# Patient Record
Sex: FEMALE | Race: WHITE | NOT HISPANIC OR LATINO | Employment: FULL TIME | URBAN - METROPOLITAN AREA
[De-identification: names, ages, dates, MRNs, and addresses within clinical notes are randomized per-mention and may not be internally consistent; named-entity substitution may affect disease eponyms.]

---

## 2017-03-07 ENCOUNTER — CONVERSION ENCOUNTER (OUTPATIENT)
Dept: GENERAL RADIOLOGY | Facility: HOSPITAL | Age: 57
End: 2017-03-07

## 2018-02-13 ENCOUNTER — OFFICE VISIT CONVERTED (OUTPATIENT)
Dept: FAMILY MEDICINE CLINIC | Facility: CLINIC | Age: 58
End: 2018-02-13
Attending: FAMILY MEDICINE

## 2018-02-13 ENCOUNTER — CONVERSION ENCOUNTER (OUTPATIENT)
Dept: FAMILY MEDICINE CLINIC | Facility: CLINIC | Age: 58
End: 2018-02-13

## 2018-03-26 ENCOUNTER — CONVERSION ENCOUNTER (OUTPATIENT)
Dept: GENERAL RADIOLOGY | Facility: HOSPITAL | Age: 58
End: 2018-03-26

## 2018-05-09 ENCOUNTER — CONVERSION ENCOUNTER (OUTPATIENT)
Dept: FAMILY MEDICINE CLINIC | Facility: CLINIC | Age: 58
End: 2018-05-09

## 2018-05-09 ENCOUNTER — OFFICE VISIT CONVERTED (OUTPATIENT)
Dept: FAMILY MEDICINE CLINIC | Facility: CLINIC | Age: 58
End: 2018-05-09
Attending: FAMILY MEDICINE

## 2019-01-22 ENCOUNTER — OFFICE VISIT CONVERTED (OUTPATIENT)
Dept: FAMILY MEDICINE CLINIC | Facility: CLINIC | Age: 59
End: 2019-01-22
Attending: FAMILY MEDICINE

## 2019-01-22 ENCOUNTER — HOSPITAL ENCOUNTER (OUTPATIENT)
Dept: FAMILY MEDICINE CLINIC | Facility: CLINIC | Age: 59
Discharge: HOME | End: 2019-01-22
Attending: FAMILY MEDICINE

## 2019-01-22 ENCOUNTER — CONVERSION ENCOUNTER (OUTPATIENT)
Dept: FAMILY MEDICINE CLINIC | Facility: CLINIC | Age: 59
End: 2019-01-22

## 2019-01-24 LAB
BACTERIA SPEC AEROBE CULT: ABNORMAL
CIPROFLOXACIN SUSC ISLT: <=0.5
CLINDAMYCIN SUSC ISLT: 0.25
DAPTOMYCIN SUSC ISLT: 0.25
ERYTHROMYCIN SUSC ISLT: >=8
GENTAMICIN SUSC ISLT: <=0.5
LEVOFLOXACIN SUSC ISLT: 0.25
OXACILLIN SUSC ISLT: >=4
RIFAMPIN SUSC ISLT: <=0.5
TETRACYCLINE SUSC ISLT: <=1
TMP SMX SUSC ISLT: <=10
VANCOMYCIN SUSC ISLT: 1

## 2019-01-31 ENCOUNTER — CONVERSION ENCOUNTER (OUTPATIENT)
Dept: FAMILY MEDICINE CLINIC | Facility: CLINIC | Age: 59
End: 2019-01-31

## 2019-01-31 ENCOUNTER — OFFICE VISIT CONVERTED (OUTPATIENT)
Dept: FAMILY MEDICINE CLINIC | Facility: CLINIC | Age: 59
End: 2019-01-31
Attending: FAMILY MEDICINE

## 2019-02-25 ENCOUNTER — HOSPITAL ENCOUNTER (OUTPATIENT)
Dept: FAMILY MEDICINE CLINIC | Facility: CLINIC | Age: 59
Discharge: HOME | End: 2019-02-25
Attending: FAMILY MEDICINE

## 2019-02-25 LAB
ALBUMIN SERPL-MCNC: 4.3 G/DL (ref 3.5–5)
ALBUMIN/GLOB SERPL: 1.2 {RATIO} (ref 1.4–2.6)
ALP SERPL-CCNC: 92 U/L (ref 53–141)
ALT SERPL-CCNC: 13 U/L (ref 10–40)
ANION GAP SERPL CALC-SCNC: 17 MMOL/L (ref 8–19)
AST SERPL-CCNC: 20 U/L (ref 15–50)
BASOPHILS # BLD AUTO: 0.04 10*3/UL (ref 0–0.2)
BASOPHILS NFR BLD AUTO: 0.7 % (ref 0–3)
BILIRUB SERPL-MCNC: 0.43 MG/DL (ref 0.2–1.3)
BUN SERPL-MCNC: 15 MG/DL (ref 5–25)
BUN/CREAT SERPL: 17 {RATIO} (ref 6–20)
CALCIUM SERPL-MCNC: 9.5 MG/DL (ref 8.7–10.4)
CHLORIDE SERPL-SCNC: 102 MMOL/L (ref 99–111)
CHOLEST SERPL-MCNC: 199 MG/DL (ref 107–200)
CHOLEST/HDLC SERPL: 4.1 {RATIO} (ref 3–6)
CONV ABS IMM GRAN: 0.01 10*3/UL (ref 0–0.2)
CONV CO2: 28 MMOL/L (ref 22–32)
CONV IMMATURE GRAN: 0.2 % (ref 0–1.8)
CONV TOTAL PROTEIN: 7.8 G/DL (ref 6.3–8.2)
CREAT UR-MCNC: 0.86 MG/DL (ref 0.5–0.9)
DEPRECATED RDW RBC AUTO: 48.7 FL (ref 36.4–46.3)
EOSINOPHIL # BLD AUTO: 0.18 10*3/UL (ref 0–0.7)
EOSINOPHIL # BLD AUTO: 3.1 % (ref 0–7)
ERYTHROCYTE [DISTWIDTH] IN BLOOD BY AUTOMATED COUNT: 15 % (ref 11.7–14.4)
GFR SERPLBLD BASED ON 1.73 SQ M-ARVRAT: >60 ML/MIN/{1.73_M2}
GLOBULIN UR ELPH-MCNC: 3.5 G/DL (ref 2–3.5)
GLUCOSE SERPL-MCNC: 89 MG/DL (ref 65–99)
HBA1C MFR BLD: 12.4 G/DL (ref 12–16)
HCT VFR BLD AUTO: 41.1 % (ref 37–47)
HDLC SERPL-MCNC: 48 MG/DL (ref 40–60)
LDLC SERPL CALC-MCNC: 108 MG/DL (ref 70–100)
LYMPHOCYTES # BLD AUTO: 1.99 10*3/UL (ref 1–5)
MCH RBC QN AUTO: 26.7 PG (ref 27–31)
MCHC RBC AUTO-ENTMCNC: 30.2 G/DL (ref 33–37)
MCV RBC AUTO: 88.4 FL (ref 81–99)
MONOCYTES # BLD AUTO: 0.45 10*3/UL (ref 0.2–1.2)
MONOCYTES NFR BLD AUTO: 7.8 % (ref 3–10)
NEUTROPHILS # BLD AUTO: 3.11 10*3/UL (ref 2–8)
NEUTROPHILS NFR BLD AUTO: 53.8 % (ref 30–85)
NRBC CBCN: 0 % (ref 0–0.7)
OSMOLALITY SERPL CALC.SUM OF ELEC: 294 MOSM/KG (ref 273–304)
PLATELET # BLD AUTO: 265 10*3/UL (ref 130–400)
PMV BLD AUTO: 11.1 FL (ref 9.4–12.3)
POTASSIUM SERPL-SCNC: 4.8 MMOL/L (ref 3.5–5.3)
RBC # BLD AUTO: 4.65 10*6/UL (ref 4.2–5.4)
SODIUM SERPL-SCNC: 142 MMOL/L (ref 135–147)
TRIGL SERPL-MCNC: 215 MG/DL (ref 40–150)
VARIANT LYMPHS NFR BLD MANUAL: 34.4 % (ref 20–45)
VLDLC SERPL-MCNC: 43 MG/DL (ref 5–37)
WBC # BLD AUTO: 5.78 10*3/UL (ref 4.8–10.8)

## 2019-02-26 ENCOUNTER — OFFICE VISIT CONVERTED (OUTPATIENT)
Dept: FAMILY MEDICINE CLINIC | Facility: CLINIC | Age: 59
End: 2019-02-26
Attending: FAMILY MEDICINE

## 2019-03-08 ENCOUNTER — HOSPITAL ENCOUNTER (OUTPATIENT)
Dept: URGENT CARE | Facility: CLINIC | Age: 59
Discharge: HOME | End: 2019-03-08
Attending: EMERGENCY MEDICINE

## 2019-03-09 LAB — BACTERIA SPEC AEROBE CULT: NORMAL

## 2019-03-13 ENCOUNTER — CONVERSION ENCOUNTER (OUTPATIENT)
Dept: FAMILY MEDICINE CLINIC | Facility: CLINIC | Age: 59
End: 2019-03-13

## 2019-03-13 ENCOUNTER — OFFICE VISIT CONVERTED (OUTPATIENT)
Dept: FAMILY MEDICINE CLINIC | Facility: CLINIC | Age: 59
End: 2019-03-13
Attending: FAMILY MEDICINE

## 2019-05-09 ENCOUNTER — OFFICE VISIT CONVERTED (OUTPATIENT)
Dept: FAMILY MEDICINE CLINIC | Facility: CLINIC | Age: 59
End: 2019-05-09
Attending: NURSE PRACTITIONER

## 2019-07-21 ENCOUNTER — HOSPITAL ENCOUNTER (OUTPATIENT)
Dept: URGENT CARE | Facility: CLINIC | Age: 59
Discharge: HOME | End: 2019-07-21

## 2019-08-15 ENCOUNTER — OFFICE VISIT CONVERTED (OUTPATIENT)
Dept: FAMILY MEDICINE CLINIC | Facility: CLINIC | Age: 59
End: 2019-08-15
Attending: NURSE PRACTITIONER

## 2019-08-15 ENCOUNTER — HOSPITAL ENCOUNTER (OUTPATIENT)
Dept: GENERAL RADIOLOGY | Facility: HOSPITAL | Age: 59
Discharge: HOME | End: 2019-08-15
Attending: NURSE PRACTITIONER

## 2019-08-15 ENCOUNTER — HOSPITAL ENCOUNTER (OUTPATIENT)
Dept: FAMILY MEDICINE CLINIC | Facility: CLINIC | Age: 59
Discharge: HOME | End: 2019-08-15
Attending: NURSE PRACTITIONER

## 2019-08-19 ENCOUNTER — OFFICE VISIT CONVERTED (OUTPATIENT)
Dept: UROLOGY | Facility: CLINIC | Age: 59
End: 2019-08-19
Attending: UROLOGY

## 2019-08-19 ENCOUNTER — CONVERSION ENCOUNTER (OUTPATIENT)
Dept: SURGERY | Facility: CLINIC | Age: 59
End: 2019-08-19

## 2020-02-26 ENCOUNTER — HOSPITAL ENCOUNTER (OUTPATIENT)
Dept: OTHER | Facility: HOSPITAL | Age: 60
Discharge: HOME | End: 2020-02-26
Attending: FAMILY MEDICINE

## 2020-02-26 LAB
ALBUMIN SERPL-MCNC: 4.2 G/DL (ref 3.5–5)
ALBUMIN/GLOB SERPL: 1.4 {RATIO} (ref 1.4–2.6)
ALP SERPL-CCNC: 99 U/L (ref 53–141)
ALT SERPL-CCNC: 18 U/L (ref 10–40)
ANION GAP SERPL CALC-SCNC: 15 MMOL/L (ref 8–19)
AST SERPL-CCNC: 19 U/L (ref 15–50)
BASOPHILS # BLD AUTO: 0.03 10*3/UL (ref 0–0.2)
BASOPHILS NFR BLD AUTO: 0.5 % (ref 0–3)
BILIRUB SERPL-MCNC: 0.31 MG/DL (ref 0.2–1.3)
BUN SERPL-MCNC: 15 MG/DL (ref 5–25)
BUN/CREAT SERPL: 18 {RATIO} (ref 6–20)
CALCIUM SERPL-MCNC: 9 MG/DL (ref 8.7–10.4)
CHLORIDE SERPL-SCNC: 98 MMOL/L (ref 99–111)
CHOLEST SERPL-MCNC: 165 MG/DL (ref 107–200)
CHOLEST/HDLC SERPL: 4 {RATIO} (ref 3–6)
CONV ABS IMM GRAN: 0.01 10*3/UL (ref 0–0.2)
CONV CO2: 29 MMOL/L (ref 22–32)
CONV IMMATURE GRAN: 0.2 % (ref 0–1.8)
CONV TOTAL PROTEIN: 7.2 G/DL (ref 6.3–8.2)
CREAT UR-MCNC: 0.85 MG/DL (ref 0.5–0.9)
DEPRECATED RDW RBC AUTO: 46.8 FL (ref 36.4–46.3)
EOSINOPHIL # BLD AUTO: 0.21 10*3/UL (ref 0–0.7)
EOSINOPHIL # BLD AUTO: 3.6 % (ref 0–7)
ERYTHROCYTE [DISTWIDTH] IN BLOOD BY AUTOMATED COUNT: 14.5 % (ref 11.7–14.4)
GFR SERPLBLD BASED ON 1.73 SQ M-ARVRAT: >60 ML/MIN/{1.73_M2}
GLOBULIN UR ELPH-MCNC: 3 G/DL (ref 2–3.5)
GLUCOSE SERPL-MCNC: 116 MG/DL (ref 65–99)
HCT VFR BLD AUTO: 40.9 % (ref 37–47)
HDLC SERPL-MCNC: 41 MG/DL (ref 40–60)
HGB BLD-MCNC: 12.3 G/DL (ref 12–16)
LDLC SERPL CALC-MCNC: 83 MG/DL (ref 70–100)
LYMPHOCYTES # BLD AUTO: 1.68 10*3/UL (ref 1–5)
LYMPHOCYTES NFR BLD AUTO: 28.8 % (ref 20–45)
MCH RBC QN AUTO: 26.2 PG (ref 27–31)
MCHC RBC AUTO-ENTMCNC: 30.1 G/DL (ref 33–37)
MCV RBC AUTO: 87 FL (ref 81–99)
MONOCYTES # BLD AUTO: 0.51 10*3/UL (ref 0.2–1.2)
MONOCYTES NFR BLD AUTO: 8.7 % (ref 3–10)
NEUTROPHILS # BLD AUTO: 3.39 10*3/UL (ref 2–8)
NEUTROPHILS NFR BLD AUTO: 58.2 % (ref 30–85)
NRBC CBCN: 0 % (ref 0–0.7)
OSMOLALITY SERPL CALC.SUM OF ELEC: 288 MOSM/KG (ref 273–304)
PLATELET # BLD AUTO: 259 10*3/UL (ref 130–400)
PMV BLD AUTO: 10.5 FL (ref 9.4–12.3)
POTASSIUM SERPL-SCNC: 3.8 MMOL/L (ref 3.5–5.3)
RBC # BLD AUTO: 4.7 10*6/UL (ref 4.2–5.4)
SODIUM SERPL-SCNC: 138 MMOL/L (ref 135–147)
TRIGL SERPL-MCNC: 207 MG/DL (ref 40–150)
TSH SERPL-ACNC: 2.62 M[IU]/L (ref 0.27–4.2)
VLDLC SERPL-MCNC: 41 MG/DL (ref 5–37)
WBC # BLD AUTO: 5.83 10*3/UL (ref 4.8–10.8)

## 2020-02-27 ENCOUNTER — CONVERSION ENCOUNTER (OUTPATIENT)
Dept: FAMILY MEDICINE CLINIC | Facility: CLINIC | Age: 60
End: 2020-02-27

## 2020-02-27 ENCOUNTER — OFFICE VISIT CONVERTED (OUTPATIENT)
Dept: FAMILY MEDICINE CLINIC | Facility: CLINIC | Age: 60
End: 2020-02-27
Attending: FAMILY MEDICINE

## 2020-04-27 ENCOUNTER — CONVERSION ENCOUNTER (OUTPATIENT)
Dept: FAMILY MEDICINE CLINIC | Facility: CLINIC | Age: 60
End: 2020-04-27

## 2020-04-27 ENCOUNTER — OFFICE VISIT CONVERTED (OUTPATIENT)
Dept: FAMILY MEDICINE CLINIC | Facility: CLINIC | Age: 60
End: 2020-04-27
Attending: FAMILY MEDICINE

## 2020-04-27 ENCOUNTER — HOSPITAL ENCOUNTER (OUTPATIENT)
Dept: GENERAL RADIOLOGY | Facility: HOSPITAL | Age: 60
Discharge: HOME | End: 2020-04-27
Attending: FAMILY MEDICINE

## 2020-04-30 ENCOUNTER — OFFICE VISIT CONVERTED (OUTPATIENT)
Dept: ORTHOPEDIC SURGERY | Facility: CLINIC | Age: 60
End: 2020-04-30
Attending: ORTHOPAEDIC SURGERY

## 2020-04-30 ENCOUNTER — CONVERSION ENCOUNTER (OUTPATIENT)
Dept: ORTHOPEDIC SURGERY | Facility: CLINIC | Age: 60
End: 2020-04-30

## 2020-05-21 ENCOUNTER — HOSPITAL ENCOUNTER (OUTPATIENT)
Dept: GENERAL RADIOLOGY | Facility: HOSPITAL | Age: 60
Discharge: HOME | End: 2020-05-21
Attending: FAMILY MEDICINE

## 2020-05-28 ENCOUNTER — OFFICE VISIT CONVERTED (OUTPATIENT)
Dept: ORTHOPEDIC SURGERY | Facility: CLINIC | Age: 60
End: 2020-05-28
Attending: ORTHOPAEDIC SURGERY

## 2020-06-26 ENCOUNTER — OFFICE VISIT CONVERTED (OUTPATIENT)
Dept: ORTHOPEDIC SURGERY | Facility: CLINIC | Age: 60
End: 2020-06-26
Attending: PHYSICIAN ASSISTANT

## 2020-07-06 ENCOUNTER — HOSPITAL ENCOUNTER (OUTPATIENT)
Dept: GENERAL RADIOLOGY | Facility: HOSPITAL | Age: 60
Discharge: HOME | End: 2020-07-06
Attending: UROLOGY

## 2020-07-17 ENCOUNTER — HOSPITAL ENCOUNTER (OUTPATIENT)
Dept: CT IMAGING | Facility: HOSPITAL | Age: 60
Discharge: HOME | End: 2020-07-17
Attending: UROLOGY

## 2020-08-13 ENCOUNTER — HOSPITAL ENCOUNTER (OUTPATIENT)
Dept: PREADMISSION TESTING | Facility: HOSPITAL | Age: 60
Discharge: HOME | End: 2020-08-13
Attending: UROLOGY

## 2020-08-15 LAB — SARS-COV-2 RNA SPEC QL NAA+PROBE: NOT DETECTED

## 2020-08-18 ENCOUNTER — HOSPITAL ENCOUNTER (OUTPATIENT)
Dept: PERIOP | Facility: HOSPITAL | Age: 60
Setting detail: HOSPITAL OUTPATIENT SURGERY
Discharge: HOME | End: 2020-08-18
Attending: UROLOGY

## 2020-08-26 ENCOUNTER — TELEPHONE CONVERTED (OUTPATIENT)
Dept: UROLOGY | Facility: CLINIC | Age: 60
End: 2020-08-26
Attending: UROLOGY

## 2020-09-02 LAB
COLOR STONE: NORMAL
COMPN STONE: NORMAL
CONV CA OXALATE DIHYDRATE: 20 %
CONV CA OXALATE MONOHYDRATE: 60 %
CONV CALCULI COMMENT: NORMAL
CONV CALCULI DISCLAIMER: NORMAL
CONV CALCULI NOTE: NORMAL
CONV PHOTO (CALCULI): NORMAL
HYDROXYAPATITE: 20 %
SIZE STONE: NORMAL MM
WT STONE: 87 MG

## 2021-01-25 ENCOUNTER — HOSPITAL ENCOUNTER (OUTPATIENT)
Dept: URGENT CARE | Facility: CLINIC | Age: 61
Discharge: HOME | End: 2021-01-25
Attending: EMERGENCY MEDICINE

## 2021-01-27 LAB — SARS-COV-2 RNA SPEC QL NAA+PROBE: NOT DETECTED

## 2021-03-11 ENCOUNTER — HOSPITAL ENCOUNTER (OUTPATIENT)
Dept: FAMILY MEDICINE CLINIC | Facility: CLINIC | Age: 61
Discharge: HOME | End: 2021-03-11
Attending: FAMILY MEDICINE

## 2021-03-11 LAB
ALBUMIN SERPL-MCNC: 4.1 G/DL (ref 3.5–5)
ALBUMIN/GLOB SERPL: 1.4 {RATIO} (ref 1.4–2.6)
ALP SERPL-CCNC: 102 U/L (ref 53–141)
ALT SERPL-CCNC: 17 U/L (ref 10–40)
ANION GAP SERPL CALC-SCNC: 14 MMOL/L (ref 8–19)
AST SERPL-CCNC: 19 U/L (ref 15–50)
BASOPHILS # BLD AUTO: 0.03 10*3/UL (ref 0–0.2)
BASOPHILS NFR BLD AUTO: 0.6 % (ref 0–3)
BILIRUB SERPL-MCNC: 0.43 MG/DL (ref 0.2–1.3)
BUN SERPL-MCNC: 19 MG/DL (ref 5–25)
BUN/CREAT SERPL: 25 {RATIO} (ref 6–20)
CALCIUM SERPL-MCNC: 9.1 MG/DL (ref 8.7–10.4)
CHLORIDE SERPL-SCNC: 103 MMOL/L (ref 99–111)
CHOLEST SERPL-MCNC: 182 MG/DL (ref 107–200)
CHOLEST/HDLC SERPL: 4.4 {RATIO} (ref 3–6)
CONV ABS IMM GRAN: 0.02 10*3/UL (ref 0–0.2)
CONV CO2: 27 MMOL/L (ref 22–32)
CONV IMMATURE GRAN: 0.4 % (ref 0–1.8)
CONV TOTAL PROTEIN: 7.1 G/DL (ref 6.3–8.2)
CREAT UR-MCNC: 0.77 MG/DL (ref 0.5–0.9)
DEPRECATED RDW RBC AUTO: 47.2 FL (ref 36.4–46.3)
EOSINOPHIL # BLD AUTO: 0.17 10*3/UL (ref 0–0.7)
EOSINOPHIL # BLD AUTO: 3.2 % (ref 0–7)
ERYTHROCYTE [DISTWIDTH] IN BLOOD BY AUTOMATED COUNT: 14.6 % (ref 11.7–14.4)
GFR SERPLBLD BASED ON 1.73 SQ M-ARVRAT: >60 ML/MIN/{1.73_M2}
GLOBULIN UR ELPH-MCNC: 3 G/DL (ref 2–3.5)
GLUCOSE SERPL-MCNC: 102 MG/DL (ref 65–99)
HCT VFR BLD AUTO: 40.5 % (ref 37–47)
HDLC SERPL-MCNC: 41 MG/DL (ref 40–60)
HGB BLD-MCNC: 12.4 G/DL (ref 12–16)
LDLC SERPL CALC-MCNC: 109 MG/DL (ref 70–100)
LYMPHOCYTES # BLD AUTO: 1.84 10*3/UL (ref 1–5)
LYMPHOCYTES NFR BLD AUTO: 35 % (ref 20–45)
MCH RBC QN AUTO: 26.8 PG (ref 27–31)
MCHC RBC AUTO-ENTMCNC: 30.6 G/DL (ref 33–37)
MCV RBC AUTO: 87.7 FL (ref 81–99)
MONOCYTES # BLD AUTO: 0.41 10*3/UL (ref 0.2–1.2)
MONOCYTES NFR BLD AUTO: 7.8 % (ref 3–10)
NEUTROPHILS # BLD AUTO: 2.79 10*3/UL (ref 2–8)
NEUTROPHILS NFR BLD AUTO: 53 % (ref 30–85)
NRBC CBCN: 0 % (ref 0–0.7)
OSMOLALITY SERPL CALC.SUM OF ELEC: 292 MOSM/KG (ref 273–304)
PLATELET # BLD AUTO: 262 10*3/UL (ref 130–400)
PMV BLD AUTO: 11.1 FL (ref 9.4–12.3)
POTASSIUM SERPL-SCNC: 4.2 MMOL/L (ref 3.5–5.3)
RBC # BLD AUTO: 4.62 10*6/UL (ref 4.2–5.4)
SODIUM SERPL-SCNC: 140 MMOL/L (ref 135–147)
TRIGL SERPL-MCNC: 159 MG/DL (ref 40–150)
TSH SERPL-ACNC: 1.9 M[IU]/L (ref 0.27–4.2)
VLDLC SERPL-MCNC: 32 MG/DL (ref 5–37)
WBC # BLD AUTO: 5.26 10*3/UL (ref 4.8–10.8)

## 2021-03-18 ENCOUNTER — CONVERSION ENCOUNTER (OUTPATIENT)
Dept: FAMILY MEDICINE CLINIC | Facility: CLINIC | Age: 61
End: 2021-03-18

## 2021-03-18 ENCOUNTER — OFFICE VISIT CONVERTED (OUTPATIENT)
Dept: FAMILY MEDICINE CLINIC | Facility: CLINIC | Age: 61
End: 2021-03-18
Attending: FAMILY MEDICINE

## 2021-03-18 ENCOUNTER — HOSPITAL ENCOUNTER (OUTPATIENT)
Dept: FAMILY MEDICINE CLINIC | Facility: CLINIC | Age: 61
Discharge: HOME | End: 2021-03-18
Attending: FAMILY MEDICINE

## 2021-03-18 LAB
BILIRUB UR QL STRIP: NEGATIVE
COLOR UR: YELLOW
CONV CLARITY OF URINE: CLEAR
CONV PROTEIN IN URINE BY AUTOMATED TEST STRIP: NEGATIVE
CONV UROBILINOGEN IN URINE BY AUTOMATED TEST STRIP: NORMAL
GLUCOSE UR QL: NEGATIVE
HGB UR QL STRIP: NEGATIVE
KETONES UR QL STRIP: NEGATIVE
LEUKOCYTE ESTERASE UR QL STRIP: NORMAL
NITRITE UR QL STRIP: NEGATIVE
PH UR STRIP.AUTO: 7 [PH]
SP GR UR: 1.02

## 2021-03-22 LAB
CONV LAST MENSTURAL PERIOD: 2018
SPECIMEN SOURCE: NORMAL
SPECIMEN SOURCE: NORMAL
THIN PREP CVX: NORMAL

## 2021-04-08 ENCOUNTER — OFFICE VISIT CONVERTED (OUTPATIENT)
Dept: SURGERY | Facility: CLINIC | Age: 61
End: 2021-04-08
Attending: NURSE PRACTITIONER

## 2021-04-23 ENCOUNTER — HOSPITAL ENCOUNTER (OUTPATIENT)
Dept: GASTROENTEROLOGY | Facility: HOSPITAL | Age: 61
Setting detail: HOSPITAL OUTPATIENT SURGERY
Discharge: HOME | End: 2021-04-23
Attending: SURGERY

## 2021-05-10 ENCOUNTER — OFFICE VISIT CONVERTED (OUTPATIENT)
Dept: SURGERY | Facility: CLINIC | Age: 61
End: 2021-05-10
Attending: NURSE PRACTITIONER

## 2021-05-10 NOTE — H&P
History and Physical      Patient Name: Marilee De La Rosa   Patient ID: [id number]   Sex: Female   YOB: 1960    Primary Care Provider: Fran Renner III MD   Referring Provider: Fran Renner III MD    Visit Date: 2020    Provider: Dakota Ruiz MD   Location: [facility]   Location Address: [address] [place] [address]  [id number]   Location Phone: [phone]          Chief Complaint  · Left hand pain      History Of Present Illness  Marilee De La Rosa is a 59 year old /White female who presents today to [facility].      Patient presents today for left hand. Patient was walking her dog when she fell on her left hand. Patient had some X-Rays ordered and these revealed a non displaced fracture to the 5th metacarpal neck. No previous fractures and other wise healthy. Patient has been off of work because of COVID-19. Patient reports pain and swelling to the hand. Patient made herself a brace and has been keeping it wrapped.       Past Medical History  Allergic rhinitis; Depression (emotion); High cholesterol; Kidney calculus; Kidney Disease; Medication management; Renal mass; Urinary tract infection; Vaginal bleeding         Past Surgical History  Gum graft; I have had no surgeries; Tubal ligation; [place] Tooth Extraction         Medication List  fluoxetine 20 mg oral capsule; fluticasone propionate 50 mcg/actuation nasal spray,suspension; Mucus Relief DM  mg oral tablet; simvastatin 40 mg oral tablet; Stahist AD 25-60 mg oral tablet; Vitamin D3 1,000 unit oral capsule; Zyrtec 10 mg oral tablet         Allergy List  NO KNOWN DRUG ALLERGIES; ibuprofen         Family Medical History  Lung Disease; Heart Disease; Cancer, Unspecified; Diabetes, unspecified type; Heart Attack (MI)         Reproductive History   3 Para 2 0 1 2       Social History  Active but no formal exercise; Alcohol (Never); Alcohol Use (Never); lives with spouse; ;  ".; Recreational Drug Use (Never); Tobacco (Never); Working         Immunizations  Name Date Admin   Hepatitis A    Hepatitis A    Influenza    Influenza    Influenza    Influenza    Influenza    Influenza    Tdap          Review of Systems  · Constitutional  o Denies  o : fever, chills, weight loss  · Cardiovascular  o Denies  o : chest pain, shortness of breath  · Gastrointestinal  o Denies  o : liver disease, heartburn, nausea, blood in stools  · Genitourinary  o Denies  o : painful urination, blood in urine  · Integument  o Denies  o : rash, itching  · Neurologic  o Denies  o : headache, weakness, loss of consciousness  · Musculoskeletal  o Denies  o : painful, swollen joints  · Psychiatric  o Denies  o : drug/alcohol addiction, anxiety, depression      Vitals  Date Time BP Position Site L\R Cuff Size HR RR TEMP (F) WT  HT  BMI kg/m2 BSA m2 O2 Sat        04/30/2020 11:07 AM         204lbs 16oz 5'  4\" 35.19 2.05           Physical Examination  · Constitutional  o Appearance  o : well developed, well-nourished, no obvious deformities present  · Head and Face  o Head  o :   § Inspection  § : normocephalic  o Face  o :   § Inspection  § : no facial lesions  · Eyes  o Conjunctivae  o : conjunctivae normal  o Sclerae  o : sclerae white  · Ears, Nose, Mouth and Throat  o Ears  o :   § External Ears  § : appearance within normal limits  § Hearing  § : intact  o Nose  o :   § External Nose  § : appearance normal  · Neck  o Inspection/Palpation  o : normal appearance  o Range of Motion  o : full range of motion  · Respiratory  o Respiratory Effort  o : breathing unlabored  o Inspection of Chest  o : normal appearance  o Auscultation of Lungs  o : no audible wheezing or rales  · Cardiovascular  o Heart  o : regular rate  · Gastrointestinal  o Abdominal Examination  o : soft and non-tender  · Skin and Subcutaneous Tissue  o General Inspection  o : intact, no rashes  · Psychiatric  o General  o : Alert and oriented " x3  o Judgement and Insight  o : judgment and insight intact  o Mood and Affect  o : mood normal, affect appropriate  · Left Hand  o Inspection  o : Some swelling to the hand. Swelling and bruising over the lower aspect of the hand and over the 4th metacarpal neck. Decreased ROM of the hand. No scissoring or malrotation of the 4th digit. ROM decreased secondary with pain. Neurovascularly intact.               Assessment  · Fracture: 4th Metacarpal Neck     815.00  · Arthralgia of left hand     719.44/M25.542      Plan  · Medications  o Medications have been Reconciled  o Transition of Care or Provider Policy  · Instructions  o Dr. Ruiz saw and examined the patient and agrees with plan.   o Reviewed the patient's Past Medical, Social, and Family history as well as the ROS at today's visit, no changes.  o Call or return if worsening symptoms.  o Follow Up in 4 weeks.   o This note was transcribed by Lionel Zapata. mildred.  o Discussed treatment options with patient. Discussed cast vs. splint or immobilization. Patient wishes to go with brace and chun straps. Patient will do general ROM with hand but will avoid weight bearing. Follow up in 4 weeks with repeat X-Rays will call if any problems arise.             Electronically Signed by: Davy Zapata - , Other -Author on May 5, 2020 10:28:55 AM  Electronically Co-signed by: Dakota Ruiz MD -Reviewer on May 6, 2020 07:15:16 AM

## 2021-05-11 NOTE — H&P
History and Physical      Patient Name: Marilee De La Rosa   Patient ID: [id number]   Sex: Female   YOB: 1960    Primary Care Provider: Fran Renner III MD   Referring Provider: Fran Renner III MD    Visit Date: April 8, 2021    Provider: JOSE Bedolla   Location: [facility]   Location Address: [address] [place] [address]  [id number]   Location Phone: [phone]          Chief Complaint  · Requesting colonoscopy  · Age 50 or over  · Here today for a pre-surgical colon screening visit      History Of Present Illness  The patient is a 60 year old /White female presenting to the [facility]ist office on a referral from Fran Renner III, MD.   Marilee De La Rosa needs to have a screening colonoscopy.   Patient states that they have had a colonoscopy. 10 years ago   Patient currently complains of: no complaints   Patient Does not have family history of colon cancer.      Patient presents today on referral from Dr. Fran Renner for a screening colonoscopy.  Patient denies any abdominal pain, change in bowel habit, or rectal bleeding.  Denies any family history of colorectal cancer.  Reports that her sisters had polyps removed every colonoscopy.    12/11: Colonoscopy ([facility]): Normal colon.       Past Medical History  Disease Name Date Onset Notes   Allergic rhinitis --  --    Allergic rhinitis, chronic --  --    Depression (emotion) 02/26/2019 --    High cholesterol --  --    Kidney calculus --  --    Kidney Disease --  --    Kidney Stones --  --    Medication management 02/26/2019 --    Renal cyst 7/2020 seen on ct scan 7/2020   Urinary tract infection 04/29/2014 --    Vaginal bleeding 04/30/2014 --          Past Surgical History  Procedure Name Date Notes   Gum graft --  --    Kidney Stone Surgery, Unspecified --  --    Tubal ligation 1986 --    [place] Tooth Extraction 2001 --          Medication List  Name Date Started Instructions   fluoxetine 20 mg oral  capsule 2021 TAKE 1 CAPSULE BY MOUTH ONCE DAILY FOR 90 DAYS   fluticasone propionate 50 mcg/actuation nasal spray,suspension 2021 USE 2 SPRAY(S) IN EACH NOSTRIL ONCE DAILY AS NEEDED FOR 90 DAYS   ketorolac 10 mg oral tablet 2020 take 1 tablet (10 mg) by oral route every 6 hours as needed not to exceed 40 mg in 24hrs   Mucus Relief DM  mg oral tablet  take 1 tablet by oral route every 12 hours   simvastatin 40 mg oral tablet 2021 TAKE ONE TABLET BY MOUTH ONCE DAILY IN THE EVENING FOR 90 DAYS   Stahist AD 25-60 mg oral tablet 2020 TAKE 1 TABLET BY MOUTH TWICE DAILY   Vitamin D3 1,000 unit oral capsule  take 1 capsule by oral route daily   Zyrtec 10 mg oral tablet 2019 take 1 tablet by oral route once a day (at bedtime) for 90 days         Allergy List  Allergen Name Date Reaction Notes   ibuprofen --  --  --    oxycodone --  --  2020 -        Allergies Reconciled  Family Medical History  Disease Name Relative/Age Notes   Lung Disease Mother/   --    Rectal Neoplasm, Malignant Aunt/   --    Heart Disease Father/   Father  Brother with Mitral Valve replacement   Cancer, Unspecified Brother/   Brother   Diabetes, unspecified type Sister/   Sister  Sister; Brother   Heart Attack (MI) Father/68   --    Family history of colon cancer Aunt/   Aunt/60s         Reproductive History  Menstrual   Age Menarche: 12 Age Menopause: 52   Pregnancy Summary   Total Pregnancies: 3 Full Term: 2 Premature: 0   Ab Induced: 0 Ab Spontaneous: 1 Ectopics: 0   Multiples: 0 Livin         Social History  Finding Status Start/Stop Quantity Notes   Active but no formal exercise --  --/-- --  --    Alcohol Never --/-- --  2021 -    lives with spouse --  --/-- --  --     --  --/-- --  --    Recreational Drug Use Never --/-- --  no   Tobacco Never --/-- --  never smoker   Working --  --/-- --  --          Review of Systems  · Constitutional  o Denies  o : fever,  "chills  · Eyes  o Denies  o : yellowish discoloration of eyes  · HENT  o Denies  o : difficulty swallowing  · Cardiovascular  o Denies  o : chest pain, chest pain on exertion  · Respiratory  o Denies  o : shortness of breath  · Gastrointestinal  o Denies  o : nausea, vomiting, diarrhea, constipation  · Genitourinary  o Denies  o : abnormal color of urine  · Integument  o Denies  o : rash  · Neurologic  o Denies  o : tingling or numbness  · Musculoskeletal  o Denies  o : joint pain  · Endocrine  o Denies  o : weight gain, weight loss      Vitals  Date Time BP Position Site L\R Cuff Size HR RR TEMP (F) WT  HT  BMI kg/m2 BSA m2 O2 Sat FR L/min FiO2 HC       04/08/2021 10:28 AM       13  208lbs 0oz 5'  4\" 35.7 2.06             Physical Examination  · Constitutional  o Appearance  o : well developed, well-nourished, patient in no apparent distress  · Head and Face  o Head  o :   § Inspection  § : atraumatic, normocephalic  o Face  o :   § Inspection  § : no facial lesions  · Eyes  o Conjunctivae  o : conjunctivae normal  o Sclerae  o : sclerae white  · Neck  o Inspection/Palpation  o : normal appearance, no masses or tenderness, trachea midline  · Respiratory  o Respiratory Effort  o : breathing unlabored  · Skin and Subcutaneous Tissue  o General Inspection  o : no lesions present, no areas of discoloration, skin turgor normal, texture normal  · Neurologic  o Mental Status Examination  o :   § Orientation  § : grossly oriented to person, place and time  § Attention  § : attention normal, concentration abilities normal  § Fund of Knowledge  § : fund of knowledge within normal limits, patient aware of current events  o Gait and Station  o : normal gait, able to stand without difficulty  · Psychiatric  o Judgement and Insight  o : judgment and insight intact  o Mood and Affect  o : mood normal, affect appropriate              Assessment  · Screening for colon cancer     V76.51/Z12.11    Problems " Reconciled  Plan  · Orders  o Consent for Colonoscopy Screening-Possible risk/complications, benefits, and alternatives to surgical or invasive procedure have been explained to patient and/or legal guardian. -Patient has been evaluated and can tolerate anesthesia and/or sedation. Risks, benefits, and alternatives to anesthesia and sedation have been explained to patient and/or legal guardian. () - V76.51/Z12.11 - 04/23/2021  · Medications  o Medications have been Reconciled  o Transition of Care or Provider Policy  · Instructions  o Surgical Facility: [facility]  o Handouts Provided Pre-Procedure Instructions including date, time, and location of procedure.   o PLAN: Proceeed with colonoscopy. Patient understands risks/benefits and is willing to proceed.   o ***Surgical Orders***  o RISK AND BENEFITS:  o Given these options, the patient has verbally expressed an understanding of the risks of the surgery and finds these risks acceptable. Will proceed with surgery as soon as possible.  o O.R. PREP: Per protocol   o IV: Per Anesthesia  o Please sign permit for: Colonoscopy with possible biopsies by Dr. Huffman.   o The above History and Physical Examination has been completed within 30 days of admission.  o ***Patient Status***  o Outpatient  o Follow up in the in the office post procedure.  o Electronically Identified Patient Education Materials Provided Electronically  · Disposition  o EMR dragon/transcription disclaimer: Much of this encounter note is an electronic transcription/translation of spoken language to printed text. Electronic translation of spoken language may permit erroneous, or at times nonsensical words or phrases to be inadvertently trasncribed; although I have reviewed the note for such errors, some may still exist.            Electronically Signed by: JOSE Bedolla -Author on April 8, 2021 11:13:53 AM

## 2021-05-12 NOTE — PROGRESS NOTES
Progress Note      Patient Name: Marilee De La Rosa   Patient ID: [id number]   Sex: Female   YOB: 1960    Primary Care Provider: Fran Renner III MD   Referring Provider: Fran Renner III MD    Visit Date: April 27, 2020    Provider: Fran Renner III MD   Location: [facility]   Location Address: [address] [place] [address]  [id number]   Location Phone: [phone]          Chief Complaint  · Pain on top of left hand, left 4th and 5th fingers difficult to move, fall this morning while walking dog, extended left hand out       History Of Present Illness  Marilee De La Rosa is a 59 year old /White female who presents for evaluation and treatment of:      HPI     patient is a 59-year-old fell on outstretched hand tenderness over the fourth metacarpal abrasion to the left knee does not know her last tetanus shot.  We will give her the tetanus here in the office.  Pain and closing her hand fourth metacarpal in the fall was a trip.    ROS    no previous falls     Physical Exam    blood pressure is 134/74   General obviously holding her hand out some pain.    Musculoskeletal tenderness over the fourth metacarpal.  Cannot extend the hand or can close make a fist but this hurts.  Possible fracture of the fourth metacarpal    Assessment     tenderness fourth metacarpal rule out fracture    Plan     x-ray left hand       Past Medical History  Disease Name Date Onset Notes   Allergic rhinitis --  --    Depression (emotion) 02/26/2019 --    High cholesterol --  --    Kidney calculus --  --    Medication management 02/26/2019 --    Renal mass --  --    Urinary tract infection 04/29/2014 --    Vaginal bleeding 04/30/2014 --          Past Surgical History  Procedure Name Date Notes   Gum graft --  --    Tubal ligation 1986 --    [place] Tooth Extraction 2001 --          Medication List  Name Date Started Instructions   fluoxetine 20 mg oral capsule 02/27/2020 TAKE 1 CAPSULE BY  MOUTH ONCE DAILY FOR 90 DAYS   fluticasone propionate 50 mcg/actuation nasal spray,suspension 2019 USE 2 SPRAY(S) IN EACH NOSTRIL ONCE DAILY AS NEEDED FOR 90 DAYS   Mucus Relief DM  mg oral tablet  take 1 tablet by oral route every 12 hours   simvastatin 40 mg oral tablet 2020 TAKE ONE TABLET BY MOUTH ONCE DAILY IN THE EVENING FOR 90 DAYS   Stahist AD 25-60 mg oral tablet 2019 TAKE 1 TABLET BY MOUTH TWICE DAILY FOR 90 DAYS   Vitamin D3 1,000 unit oral capsule  take 1 capsule by oral route daily   Zyrtec 10 mg oral tablet 2019 take 1 tablet by oral route once a day (at bedtime) for 90 days         Allergy List  Allergen Name Date Reaction Notes   ibuprofen --  --  --        Allergies Reconciled  Family Medical History  Disease Name Relative/Age Notes   Lung Disease Mother/   --    Heart Disease Brother/  Father/   Brother with Mitral Valve replacement   Heart Attack (MI) Father/68   --          Reproductive History  Menstrual   Age Menarche: 12 Age Menopause: 52   Pregnancy Summary   Total Pregnancies: 3 Full Term: 2 Premature: 0   Ab Induced: 0 Ab Spontaneous: 1 Ectopics: 0   Multiples: 0 Livin         Social History  Finding Status Start/Stop Quantity Notes   Active but no formal exercise --  --/-- --  --    Alcohol Never --/-- --  --     --  --/-- --  --    Tobacco Never --/-- --  --          Immunizations  NameDate Admin Mfg Trade Name Lot Number Route Inj VIS Given VIS Publication   Hepatitis A02019 SKB HAVRIX-ADULT [id number]  LD 2019    Comments: ndc [id number]   Hepatitis A02019 MSD VAQTA-ADULT [id number]  LD 2019   Comments: NDC [id number]   Hpjcaljvh95/ NE Fluarix, quadrivalent, preservative free  NE NE     Comments:    Jjriyyuiv71/ [facility] Fluzone Quadrivalent [id number] IM LD 10/16/2017 2015   Comments:    Jizcmdnut98/ SKB Fluarix, quadrivalent, preservative free [id number] IM RD 10/28/2016 08/15/2015   Comments:   "  Mrynqyfmg68/20/2015 SKB Fluarix, quadrivalent, preservative free [id number] IM LD 10/20/2015 07/02/2012   Comments:    Lwuqpipuq84/13/2014 SKB Fluarix, quadrivalent, preservative free [id number] IM RD 10/13/2014 07/02/2012   Comments:    Xddfncwqg54/04/2013 NE Not Entered [id number] IM NE 12/13/2013 07/02/2012   Comments:    Tdap04/27/2020 SKB BOOSTRIX [id number] IM LD 04/27/2020    Comments: NDC [id number]         Vitals  Date Time BP Position Site L\R Cuff Size HR RR TEMP (F) WT  HT  BMI kg/m2 BSA m2 O2 Sat HC       04/27/2020 01:15 /74 Sitting       204lbs 16oz 5'  4\" 35.19 2.05                   Assessment  · Need for tetanus booster     V03.7/Z23  · Fall at home, initial encounter       Unspecified fall, initial encounter     E888.9/W19.XXXA  Unspecified place in unspecified non-institutional (private) residence as the place of occurrence of the external cause     E888.9/Y92.009  · Left hand pain     729.5/M79.642  · Finger pain, left     729.5/M79.645  04/27/2020 4th and 5th finger    · Metacarpophalangeal joint pain of left hand     719.44/M25.542    Problems Reconciled  Plan  · Orders  o Immunization Admin Fee (Single) ([facility]) (69090) - V03.7/Z23 - 04/27/2020  o TDaP Vaccine - Age 7+ (62533) - V03.7/Z23 - 04/27/2020   Vaccine - Tdap; Dose: .50; Site: Left Deltoid; Route: Intramuscular; Date: 04/27/2020 16:16:00; Exp: 12/03/2021; Lot: [id number]; Mfg: [employer]; TradeName: BOOSTRIX; Administered By: Puja Dhillon; Comment: NDC [id number]  o ACO-39: Current medications updated and reviewed () - - 04/27/2020  o ACO-13: Fall Risk Screening with 2 or more falls in past year or any fall with injury in the past year (1100F) - - 04/27/2020  o Hand (Left) 3 or more views X-Ray [facility] Preferred View ([id number]) - 729.5/M79.642, 729.5/M79.645, 719.44/M25.542 - 04/27/2020   pain top of left hand, pain left ring and pinky finger  · Medications  o Medications have been Reconciled  o Transition of Care or Provider " Policy  · Instructions  o Discussed with patient the need for tetanus vaccination.   o Patient is taking medications as prescribed and doing well.   o Take all medications as prescribed/directed.  o Patient instructed/educated on their diet and exercise program.  o Patient was educated/instructed on their diagnosis, treatment and medications prior to discharge from the clinic today.  o Bring all medicines with their bottles to each office visit.  o Time spent with the patient was 20 minutes, more than 50% face to face.  o Chronic conditions reviewed and taken into consideration for today's treatment plan.  o Discussed Covid-19 precautions including, but not limited to, social distancing, avoid touching your face, and hand washing.             Electronically Signed by: Puja Dhillon, -Author on April 28, 2020 03:50:12 PM  Electronically Co-signed by: Fran Renner III MD -Reviewer on April 29, 2020 12:44:01 PM

## 2021-05-13 NOTE — PROGRESS NOTES
"   Progress Note      Patient Name: Marilee De La Rosa   Patient ID: [id number]   Sex: Female   YOB: 1960    Primary Care Provider: Fran Renner III MD   Referring Provider: Fran Renner III MD    Visit Date: 2020    Provider: Toney Harris PA-C   Location: [facility]   Location Address: [address] [place] [address]  [id number]   Location Phone: [phone]          Chief Complaint  · Left hand pain      History Of Present Illness  Marilee De La Rosa is a 59 year old /White female who presents today to [facility]. Patient presents for follow-up evaluation of left fourth metacarpal neck fracture. Original injury was 2020. Patient states she has been using the chun straps since the last visit she states she is using the hand \"a little more \". Patient states pain has decreased she still has some pain with certain range of motion and activity but otherwise she is getting more function of the hand. Patient denies further injury. Patient denies need for pain medication or anti-inflammatory medication.       Past Medical History  Allergic rhinitis; Depression (emotion); High cholesterol; Kidney calculus; Kidney Disease; Medication management; Renal mass; Urinary tract infection; Vaginal bleeding         Past Surgical History  Gum graft; I have had no surgeries; Tubal ligation; [place] Tooth Extraction         Medication List  fluoxetine 20 mg oral capsule; fluticasone propionate 50 mcg/actuation nasal spray,suspension; Mucus Relief DM  mg oral tablet; simvastatin 40 mg oral tablet; Stahist AD 25-60 mg oral tablet; Vitamin D3 1,000 unit oral capsule; Zyrtec 10 mg oral tablet         Allergy List  NO KNOWN DRUG ALLERGIES; ibuprofen       Allergies Reconciled  Family Medical History  Lung Disease; Heart Disease; Cancer, Unspecified; Diabetes, unspecified type; Heart Attack (MI)         Reproductive History   3 Para 2 0 1 2       Social History  Active " "but no formal exercise; Alcohol (Never); Alcohol Use (Never); lives with spouse; ; .; Recreational Drug Use (Never); Tobacco (Never); Working         Immunizations  Name Date Admin   Hepatitis A    Hepatitis A    Influenza    Influenza    Influenza    Influenza    Influenza    Influenza    Tdap          Review of Systems  · Constitutional  o Denies  o : fever, chills, weight loss  · Cardiovascular  o Denies  o : chest pain, shortness of breath  · Gastrointestinal  o Denies  o : liver disease, heartburn, nausea, blood in stools  · Genitourinary  o Denies  o : painful urination, blood in urine  · Integument  o Denies  o : rash, itching  · Neurologic  o Denies  o : headache, weakness, loss of consciousness  · Musculoskeletal  o Denies  o : painful, swollen joints  · Psychiatric  o Denies  o : drug/alcohol addiction, anxiety, depression      Vitals  Date Time BP Position Site L\R Cuff Size HR RR TEMP (F) WT  HT  BMI kg/m2 BSA m2 O2 Sat        06/26/2020 09:33 AM      78 - R   211lbs 2oz 5'  4\" 36.24 2.08 98 %          Physical Examination  · Constitutional  o Appearance  o : well developed, well-nourished, no obvious deformities present  · Head and Face  o Head  o :   § Inspection  § : normocephalic  o Face  o :   § Inspection  § : no facial lesions  · Eyes  o Conjunctivae  o : conjunctivae normal  o Sclerae  o : sclerae white  · Ears, Nose, Mouth and Throat  o Ears  o :   § External Ears  § : appearance within normal limits  § Hearing  § : intact  o Nose  o :   § External Nose  § : appearance normal  · Neck  o Inspection/Palpation  o : normal appearance  o Range of Motion  o : full range of motion  · Respiratory  o Respiratory Effort  o : breathing unlabored  o Inspection of Chest  o : normal appearance  o Auscultation of Lungs  o : no audible wheezing or rales  · Cardiovascular  o Heart  o : regular rate  · Gastrointestinal  o Abdominal Examination  o : soft and non-tender  · Skin and Subcutaneous " Tissue  o General Inspection  o : intact, no rashes  · Psychiatric  o General  o : Alert and oriented x3  o Judgement and Insight  o : judgment and insight intact  o Mood and Affect  o : mood normal, affect appropriate  · Left Hand  o Inspection  o : Patient is nontender at fracture site, makes a full fist, 5 out of 5  strength, full flexion, extension, abduction and abduction of fingers and thumb.  · In Office Procedures  o View  o : AP/LATERAL  o Site  o : left, hand  o Indication  o : Left hand pain  o Study  o : X-rays ordered, taken in the office, and reviewed today.  o Xray  o : Good healing of fourth metacarpal neck fracture, no increased displacement or angulation.  o Comparative Data  o : No comparative data found          Assessment  · Arthralgia of left hand     719.44/M25.542  · Left fourth metacarpal bone fracture     815.00/S62.309A      Plan  · Orders  o Hand (Left) [facility] Preferred View ([id number]) - 719.44/M25.542 - 06/26/2020  · Medications  o Medications have been Reconciled  o Transition of Care or Provider Policy  · Instructions  o Reviewed the patient's Past Medical, Social, and Family history as well as the ROS at today's visit, no changes.  o Call or return if worsening symptoms.  o Follow Up in 4 weeks.   o Reviewed x-rays with the patient, advised her to continue activity as tolerated, and exercise handout was given. Follow-up in 4 weeks, no x-ray unless symptomatic.  o Electronically Identified Patient Education Materials Provided Electronically            Electronically Signed by: RAMIN Miranda-C -Author on June 26, 2020 10:13:36 AM  Electronically Co-signed by: Dakota Ruiz MD -Reviewer on June 27, 2020 10:23:11 PM

## 2021-05-13 NOTE — PROGRESS NOTES
Progress Note      Patient Name: Marilee De La Rosa   Patient ID: [id number]   Sex: Female   YOB: 1960    Primary Care Provider: Fran Renner III MD   Referring Provider: Fran Renner III MD    Visit Date: May 28, 2020    Provider: Dakota Ruiz MD   Location: [facility]   Location Address: [address] [place] [address]  [id number]   Location Phone: [phone]          Chief Complaint  · Left hand pain      History Of Present Illness  Marilee De La Rosa is a 59 year old /White female who presents today to [facility].      The patient presents today for follow up evaluation of 4th metacarpal fracture. She has been wearing the brace and the chun straps. She is doing well today. She has been off of work, and has occasional soreness in the hand but has been doing some dishes and activities without much problem.       Past Medical History  Allergic rhinitis; Depression (emotion); High cholesterol; Kidney calculus; Kidney Disease; Medication management; Renal mass; Urinary tract infection; Vaginal bleeding         Past Surgical History  Gum graft; I have had no surgeries; Tubal ligation; [place] Tooth Extraction         Medication List  fluoxetine 20 mg oral capsule; fluticasone propionate 50 mcg/actuation nasal spray,suspension; Mucus Relief DM  mg oral tablet; simvastatin 40 mg oral tablet; Stahist AD 25-60 mg oral tablet; Vitamin D3 1,000 unit oral capsule; Zyrtec 10 mg oral tablet         Allergy List  NO KNOWN DRUG ALLERGIES; ibuprofen         Family Medical History  Lung Disease; Heart Disease; Cancer, Unspecified; Diabetes, unspecified type; Heart Attack (MI)         Reproductive History   3 Para 2 0 1 2       Social History  Active but no formal exercise; Alcohol (Never); Alcohol Use (Never); lives with spouse; ; .; Recreational Drug Use (Never); Tobacco (Never); Working         Immunizations  Name Date Admin   Hepatitis A   "  Hepatitis A    Influenza    Influenza    Influenza    Influenza    Influenza    Influenza    Tdap          Review of Systems  · Constitutional  o Denies  o : fever, chills, weight loss  · Cardiovascular  o Denies  o : chest pain, shortness of breath  · Gastrointestinal  o Denies  o : liver disease, heartburn, nausea, blood in stools  · Genitourinary  o Denies  o : painful urination, blood in urine  · Integument  o Denies  o : rash, itching  · Neurologic  o Denies  o : headache, weakness, loss of consciousness  · Musculoskeletal  o Denies  o : painful, swollen joints  · Psychiatric  o Denies  o : drug/alcohol addiction, anxiety, depression      Vitals  Date Time BP Position Site L\R Cuff Size HR RR TEMP (F) WT  HT  BMI kg/m2 BSA m2 O2 Sat        05/28/2020 09:46 AM         204lbs 16oz 5'  4\" 35.19 2.05           Physical Examination  · Constitutional  o Appearance  o : well developed, well-nourished, no obvious deformities present  · Head and Face  o Head  o :   § Inspection  § : normocephalic  o Face  o :   § Inspection  § : no facial lesions  · Eyes  o Conjunctivae  o : conjunctivae normal  o Sclerae  o : sclerae white  · Ears, Nose, Mouth and Throat  o Ears  o :   § External Ears  § : appearance within normal limits  § Hearing  § : intact  o Nose  o :   § External Nose  § : appearance normal  · Neck  o Inspection/Palpation  o : normal appearance  o Range of Motion  o : full range of motion  · Respiratory  o Respiratory Effort  o : breathing unlabored  o Inspection of Chest  o : normal appearance  o Auscultation of Lungs  o : no audible wheezing or rales  · Cardiovascular  o Heart  o : regular rate  · Gastrointestinal  o Abdominal Examination  o : soft and non-tender  · Skin and Subcutaneous Tissue  o General Inspection  o : intact, no rashes  · Psychiatric  o General  o : Alert and oriented x3  o Judgement and Insight  o : judgment and insight intact  o Mood and Affect  o : mood normal, affect " appropriate  · Left Hand  o Inspection  o : Still some tenderness along the 4th metacarpal neck region. No deformity. ROM is grossly intact. Neurovascularly intact.   · In Office Procedures  o View  o : AP/LATERAL  o Site  o : left hand  o Indication  o : left hand pain  o Study  o : X-rays ordered, taken in the office, and reviewed today.  o Xray  o : Non-displaced partially healing fracture of the 4th metacarpal neck. No increased displacement or angulation.   o Comparative Data  o : No comparative data found          Assessment  · Fracture: 4th Metacarpal neck fracture     815.00  · Arthralgia of left hand     719.44/M25.542      Plan  · Orders  o Hand (Left) [facility] Preferred View ([id number]) - 719.44/M25.542 - 05/28/2020  · Medications  o Medications have been Reconciled  o Transition of Care or Provider Policy  · Instructions  o Dr. Ruiz saw and examined the patient and agrees with plan.   o X-rays reviewed by Dr. Ruiz.  o Reviewed the patient's Past Medical, Social, and Family history as well as the ROS at today's visit, no changes.  o Call or return if worsening symptoms.  o This note was transcribed by Lionel Zapata. mildred.  o Discussed plan with the patient. The patient is doing well today and she may return to work with no lifting over 2 pounds with her hand. Plan to follow up with in 4 weeks with repeat X-Rays of the hand. Work note was provided today. Advised patient to call with any problems.             Electronically Signed by: Davy Zapata - , Other -Author on Jessie 3, 2020 01:30:43 PM  Electronically Co-signed by: Dakota Ruiz MD -Reviewer on Jessie 3, 2020 09:33:47 PM

## 2021-05-13 NOTE — PROGRESS NOTES
Progress Note      Patient Name: Marilee De La Rosa   Patient ID: [id number]   Sex: Female   YOB: 1960    Primary Care Provider: Fran Renner III MD   Referring Provider: Fran Renner III MD    Visit Date: 2020    Provider: Griselda Mckenna MD   Location: [facility]   Location Address: [address] [place] [address]  [id number]   Location Phone: [phone]          History Of Present Illness  The patient returns via telehealth telephone visit for a scheduled post-operative visit after undergoing left ureteroscopy and stone basket extraction for left renal and ureteral calculi on 2020. A double J stent was inserted but has been removed by patient at home.   Since the procedure, the patient has had persistent flank pain.      Time spent with the patient was 7 minutes.     Staff present during the visit were myself (Dr. Mckenna) and Mariela Pollard MA.       Past Medical History  Allergic rhinitis; Depression (emotion); High cholesterol; Kidney calculus; Kidney Disease; Medication management; Renal mass; Urinary Tract Infection; Vaginal bleeding         Past Surgical History  Gum graft; Kidney Stone Surgery, Unspecified; Tubal ligation; [place] Tooth Extraction         Medication List  fluoxetine 20 mg oral capsule; fluticasone propionate 50 mcg/actuation nasal spray,suspension; Mucus Relief DM  mg oral tablet; simvastatin 40 mg oral tablet; Stahist AD 25-60 mg oral tablet; Vitamin D3 1,000 unit oral capsule; Zyrtec 10 mg oral tablet         Allergy List  ibuprofen; oxycodone         Family Medical History  Lung Disease; Heart Disease; Cancer, Unspecified; Diabetes, unspecified type; Heart Attack (MI)         Reproductive History   3 Para 2 0 1 2       Social History  Active but no formal exercise; Alcohol (Never); lives with spouse; ; Recreational Drug Use (Never); Tobacco (Never); Working         Immunizations  Name Date Admin   Hepatitis A     Hepatitis A    Influenza    Influenza    Influenza    Influenza    Influenza    Influenza    Tdap          Review of Systems  · Constitutional  o Denies  o : fever, chills  · Gastrointestinal  o Denies  o : nausea, vomiting, change in abdominal girth, diarrhea, constipation, blood in stools  · Genitourinary  o Denies  o : additional symptoms, except as noted in HPI              Assessment  · Calculus of Lower Urinary Tract     594.9/N21.9      Plan  · Orders  o KUB xray [facility] Preferred View ([id number]) - 594.9/N21.9 - 08/26/2020  · Medications  o ketorolac 10 mg oral tablet   SIG: take 1 tablet (10 mg) by oral route every 6 hours as needed not to exceed 40 mg in 24hrs   DISP: (20) tablets with 1 refills  Prescribed on 08/26/2020     o Medications have been Reconciled  o Transition of Care or Provider Policy  · Instructions  o The left uteretral stent was removed a home on Sunday without difficulty. The patient will return to the office in 12 months for KUB. I will see him/her sooner if he/she has any no stone symptoms.   o I will send in Toradol for her today. I am also sending a handout on stone prevention to her.   o Handouts were provided  o FOLLOW-UP:  o In 12 months            Electronically Signed by: Kaitlin Brower-, -Author on August 28, 2020 03:03:18 PM  Electronically Co-signed by: Griselda Mckenna MD -Reviewer on September 1, 2020 08:21:03 AM

## 2021-05-14 VITALS — HEIGHT: 64 IN | BODY MASS INDEX: 35.51 KG/M2 | RESPIRATION RATE: 13 BRPM | WEIGHT: 208 LBS

## 2021-05-14 VITALS
WEIGHT: 208 LBS | HEART RATE: 68 BPM | DIASTOLIC BLOOD PRESSURE: 70 MMHG | BODY MASS INDEX: 35.51 KG/M2 | HEIGHT: 64 IN | OXYGEN SATURATION: 96 % | TEMPERATURE: 97.6 F | SYSTOLIC BLOOD PRESSURE: 130 MMHG

## 2021-05-14 NOTE — PROGRESS NOTES
Progress Note      Patient Name: Marilee De La Rosa   Patient ID: [id number]   Sex: Female   YOB: 1960    Primary Care Provider: Fran Renner III MD   Referring Provider: Fran Renner III MD    Visit Date: March 18, 2021    Provider: Fran Renner III MD   Location: [facility]   Location Address: [address] [place] [address]  [id number]   Location Phone: [phone]          Chief Complaint  · Annual Exam  · See HPI  · PAP exam      History Of Present Illness  Last PAP Smear: 02/13/2018.   Date of Last Mammogram: 05/21/2020.   Date of Last Colonoscopy: 12/28/2011   No current complaints.   Marilee De La Rosa is a 60 year old /White female who presents for evaluation and treatment of: annual breast and pelvic.      HPI     patient is 60 years old here for annual breast and pelvic.  history of kidney stones, elevated cholesterol, depression, Allergic rhinitis.   Dr. Neri is seen had a CT scan in 2020 does have kidney cyst which are benign.        Review of systems     GI patient had a colonoscopy in 2011 has had many sisters with several sisters with polyps recurrently prep she going to a colonoscopy.  Dr. Huffman this year patient does need to lose 20 pounds does have a prediabetes.  Discussed prediabetes with the patient.  Discusseddiet.  Cardiovascular no chest pain no palpitations discussed increasing exercise 30 minutes a day 5 days a week  Skin no lesions.    Physical exam     weight is 208 this is a 3 pound decrease pulse ox 96% heart rate is 68 temperature 97.6 blood pressure 130/70  General no distress  Neck no adenopathy thyromegaly  Cardiovascular regular  Respiratory no increased work of breathing lungs clear and equal bilaterally  Abdomen soft nontender no paraspinal megaly  Breast exam no supraclavicular axillary or adenopathy no breast masses no discharge  Pelvic external genitalia is normal cervix is normal uterus normal no adnexal  masses Pap is done.    Assessment     #1 breast and pelvic done normal   #2 hypercholesterolemia on simvastatin doing well cholesterol is good.    #3 is prediabetes blood sugar was 103.    #4 overweight needs to decrease weight 20 pounds increase exercise.  He is to get the coronavirus vaccine.      Plan     colonoscopies by Dr. Huffman       Past Medical History  Disease Name Date Onset Notes   Allergic rhinitis --  --    Depression (emotion) 02/26/2019 --    High cholesterol --  --    Kidney calculus --  --    Kidney Disease --  --    Medication management 02/26/2019 --    Renal cyst 7/2020 seen on ct scan 7/2020   Urinary tract infection 04/29/2014 --    Vaginal bleeding 04/30/2014 --          Past Surgical History  Procedure Name Date Notes   Gum graft --  --    Kidney Stone Surgery, Unspecified --  --    Tubal ligation 1986 --    [place] Tooth Extraction 2001 --          Medication List  Name Date Started Instructions   fluoxetine 20 mg oral capsule 03/18/2021 TAKE 1 CAPSULE BY MOUTH ONCE DAILY FOR 90 DAYS   fluticasone propionate 50 mcg/actuation nasal spray,suspension 03/18/2021 USE 2 SPRAY(S) IN EACH NOSTRIL ONCE DAILY AS NEEDED FOR 90 DAYS   ketorolac 10 mg oral tablet 08/26/2020 take 1 tablet (10 mg) by oral route every 6 hours as needed not to exceed 40 mg in 24hrs   Mucus Relief DM  mg oral tablet  take 1 tablet by oral route every 12 hours   simvastatin 40 mg oral tablet 03/18/2021 TAKE ONE TABLET BY MOUTH ONCE DAILY IN THE EVENING FOR 90 DAYS   Stahist AD 25-60 mg oral tablet 11/13/2020 TAKE 1 TABLET BY MOUTH TWICE DAILY   Vitamin D3 1,000 unit oral capsule  take 1 capsule by oral route daily   Zyrtec 10 mg oral tablet 05/09/2019 take 1 tablet by oral route once a day (at bedtime) for 90 days         Allergy List  Allergen Name Date Reaction Notes   ibuprofen --  --  --    oxycodone --  --  08/20/2020 -        Allergies Reconciled  Family Medical History  Disease Name Relative/Age Notes   Lung  "Disease Mother/   --    Heart Disease Father/   Father  Brother with Mitral Valve replacement   Cancer, Unspecified Brother/   Brother   Diabetes, unspecified type Brother/  Sister/   Sister; Brother   Heart Attack (MI) Father/68   --          Reproductive History  Menstrual   Age Menarche: 12 Age Menopause: 52   Pregnancy Summary   Total Pregnancies: 3 Full Term: 2 Premature: 0   Ab Induced: 0 Ab Spontaneous: 1 Ectopics: 0   Multiples: 0 Livin         Social History  Finding Status Start/Stop Quantity Notes   Active but no formal exercise --  --/-- --  --    Alcohol Never --/-- --  --    lives with spouse --  --/-- --  --     --  --/-- --  --    Recreational Drug Use Never --/-- --  no   Tobacco Never --/-- --  never smoker   Working --  --/-- --  --          Immunizations  NameDate Admin Mfg Trade Name Lot Number Route Inj VIS Given VIS Publication   Hepatitis A02019 SKB HAVRIX-ADULT [id number] [facility] 2019    Comments: ndc [id number]   Hepatitis A02019 MSD VAQTA-ADULT [id number] [facility] 2019   Comments: NDC [id number]   Hrwxatvez31/ SKB Fluarix, quadrivalent, preservative free [id number] [facility] 2021    Comments:    Tdap2020 [facility] BOOSTRIX A29KM [facility] 2020    Comments: NDC [id number]         Review of Systems  · Constitutional  o * See HPI  · Eyes  o * See HPI  · HENT  o * See HPI  · Breasts  o * See HPI  · Cardiovascular  o * See HPI  · Respiratory  o * See HPI  · Gastrointestinal  o * See HPI  · Genitourinary  o * See HPI  · Integument  o * See HPI  · Neurologic  o * See HPI  · Musculoskeletal  o * See HPI  · Endocrine  o * See HPI  · Psychiatric  o * See HPI  · Heme-Lymph  o * See HPI  · Allergic-Immunologic  o * See HPI      Vitals  Date Time BP Position Site L\R Cuff Size HR RR TEMP (F) WT  HT  BMI kg/m2 BSA m2 O2 Sat FR L/min FiO2 HC       2021 10:09 /70 Sitting    68 - R  97.6 208lbs 0oz 5'  4\" 35.7 2.06 96 %  21%          Physical " Examination  · Constitutional  o Appearance  o : well-nourished, in no acute distress  · Neck  o Inspection/Palpation  o : normal appearance, no masses or tenderness, trachea midline  o Thyroid  o : gland size normal, nontender, no nodules or masses present on palpation  · Respiratory  o Respiratory Effort  o : breathing unlabored  o Inspection of Chest  o : normal appearance  o Auscultation of Lungs  o : normal breath sounds throughout  · Cardiovascular  o Heart  o :   § Auscultation of Heart  § : regular rate and rhythm, no murmurs, gallops or rubs  · Breasts  o Inspection of Breasts  o : breasts symmetrical, no skin changes, no deformities present, no discharge present  o Palpation of Breasts, Axillae  o : no masses present on palpation, no breast tenderness  · Gastrointestinal  o Abdominal Examination  o : abdomen nontender to palpation, tone normal without rigidity or guarding, no masses present, normal bowel sounds  · Genitourinary  o External Genitalia  o : no inflammation, no lesions present  o Vagina  o : normal vaginal vault, no discharge present, no inflammatory lesions present, no masses present  o Bladder  o : nontender to palpation  o Cervix  o : appearance healthy, no lesions present, nontender to palpation, no discharges, no bleeding present, normal midline position  o Uterus  o : nontender to palpation, no masses present, position midline/midplane  o Adnexa  o : no tenderness or masses present on bimanual examination  o Anus  o : no inflammation or lesions present  o Perineum  o : perineum within normal limits  · Lymphatic  o Neck  o : no lymphadenopathy present  o Axilla  o : no lymphadenopathy present  o Groin  o : no lymphadenopathy present  · Neurologic  o Mental Status Examination  o :   § Orientation  § : grossly oriented to person, place and time  o Gait and Station  o : normal gait, able to stand without difficulty  · Psychiatric  o Judgement and Insight  o : judgment and insight  intact  o Mood and Affect  o : mood normal, affect appropriate          Results  · In-Office Procedures  o Lab procedure  § IOP - Urinalysis without Microscopy (Clinitek) [facility] ([address]28)   § Color Ur: Yellow   § Clarity Ur: Clear   § Glucose Ur Ql Strip: Negative   § Bilirub Ur Ql Strip: Negative   § Ketones Ur Ql Strip: Negative   § Sp Gr Ur Qn: 1.020   § Hgb Ur Ql Strip: Negative   § pH Ur-LsCnc: 7.0   § Prot Ur Ql Strip: Negative   § Urobilinogen Ur Strip-mCnc: 0.2 E.U./dL   § Nitrite Ur Ql Strip: Negative   § WBC Est Ur Ql Strip: Small       Assessment  · Screening for depression     V79.0/Z13.89  · Screening for colon cancer     V76.51/Z12.11  · Encounter for screening for cardiovascular disorders     V81.2/Z13.6  · Routine gynecological examination     V72.31/Z01.419  · Pap smear, as part of routine gynecological examination     V76.2/Z01.419  · Screening Mammogram     V76.10/Z12.39  · Annual physical exam     V70.0/Z00.00  · Depression (emotion)     311/F32.9  · Medication management     V58.69/Z79.899  · Allergic rhinitis     477.9/J30.9  · High cholesterol     272.0/E78.0  · Kidney calculus     592.0/N20.0    Problems Reconciled  Plan  · Orders  o Annual depression screening, 15 minutes (, 23262) - V79.0/Z13.89 - 03/18/2021  o ACO-18: Negative screen for clinical depression using a standardized tool () - V79.0/Z13.89 - 03/18/2021  o COLONOSCOPY REFERRAL (COLON) - V76.51/Z12.11 - 03/18/2021   Dr. Huffman  o Pap smear (52933) - V76.2/Z01.419 - 03/18/2021  o Screening Mammogram 3D Bilateral (97373, , 93098) - V76.10/Z12.39 - 08/10/2021  o ACO-14: Influenza immunization administered or previously received [facility] () - - 03/18/2021  o ACO-20: Screening Mammography documented and reviewed [facility] () - - 03/18/2021  o ACO-19: Colorectal cancer screening results documented and reviewed (3017F) - - 03/18/2021  o ACO-39: Current medications updated and reviewed (, 5626F) - -  03/18/2021  · Medications  o fluoxetine 20 mg oral capsule   SIG: TAKE 1 CAPSULE BY MOUTH ONCE DAILY FOR 90 DAYS   DISP: (90) Each with 3 refills  Refilled on 03/18/2021     o fluticasone propionate 50 mcg/actuation nasal spray,suspension   SIG: USE 2 SPRAY(S) IN EACH NOSTRIL ONCE DAILY AS NEEDED FOR 90 DAYS   DISP: (48) Gram with 1 refills  Refilled on 03/18/2021     o simvastatin 40 mg oral tablet   SIG: TAKE ONE TABLET BY MOUTH ONCE DAILY IN THE EVENING FOR 90 DAYS   DISP: (90) Tablet with 3 refills  Refilled on 03/18/2021     o Medications have been Reconciled  o Transition of Care or Provider Policy  · Instructions  o Depression Screen completed and scanned into the EMR under the designated folder within the patient's documents.  o Today's PHQ-9 result is _2__  o The provider screening met the required time of 15 minutes.  o **Pap Test/Liquid Based:   o Thin Prep  o Source:  o Cervix  o ********  o **Perform Reflex Human Papilloma Virus (HPV) High Risk on this Pap (If atypical squamous cells of the undetermined signifigcance (ASCUS)/Atypical Glandular Cells of undetermined significance (AGCUS): Low Grade Squamous Intraepitheal lesion (LGSIL): **  o Yes  o ********  o Medicare:  o No  o **Is this an annual PAP:  o Yes  o **Clinical History  o Post Menopausal:   o Previous Pap date: 2018  o Lab in which Performed: [facility]  o Normal  o Reviewed health maintenance flowsheet and updated information. Orders were placed and/or patient's response was documented.  o Counseled on diet and exercise.   o Counseled on weight-bearing exercise.  o Recommended Vitamin D 1000iu daily.  o Used cytobrush to obtain Pap smear specimen. Sent to pathology for testing and review.  o Patient is taking medications as prescribed and doing well.   o Take all medications as prescribed/directed.  o Patient instructed/educated on their diet and exercise program.  o Patient was educated/instructed on their diagnosis, treatment and medications  prior to discharge from the clinic today.  o Bring all medicines with their bottles to each office visit.  o Time spent with the patient was 30 minutes, more than 50% face to face.  o Chronic conditions reviewed and taken into consideration for today's treatment plan.  o Discussed Covid-19 precautions including, but not limited to, social distancing, avoid touching your face, and hand washing.             Electronically Signed by: Puja Dhillon, -Author on March 18, 2021 01:50:58 PM  Electronically Co-signed by: Fran Renner III MD -Reviewer on March 18, 2021 05:58:55 PM

## 2021-05-15 VITALS — OXYGEN SATURATION: 98 % | BODY MASS INDEX: 36.04 KG/M2 | WEIGHT: 211.12 LBS | HEIGHT: 64 IN | HEART RATE: 78 BPM

## 2021-05-15 VITALS
WEIGHT: 204 LBS | DIASTOLIC BLOOD PRESSURE: 72 MMHG | HEIGHT: 64 IN | BODY MASS INDEX: 34.83 KG/M2 | SYSTOLIC BLOOD PRESSURE: 130 MMHG

## 2021-05-15 VITALS
HEIGHT: 64 IN | HEART RATE: 67 BPM | OXYGEN SATURATION: 98 % | RESPIRATION RATE: 17 BRPM | WEIGHT: 196 LBS | TEMPERATURE: 98 F | BODY MASS INDEX: 33.46 KG/M2 | DIASTOLIC BLOOD PRESSURE: 53 MMHG | SYSTOLIC BLOOD PRESSURE: 136 MMHG

## 2021-05-15 VITALS
HEART RATE: 75 BPM | BODY MASS INDEX: 33.29 KG/M2 | SYSTOLIC BLOOD PRESSURE: 143 MMHG | OXYGEN SATURATION: 98 % | DIASTOLIC BLOOD PRESSURE: 62 MMHG | WEIGHT: 195 LBS | HEIGHT: 64 IN

## 2021-05-15 VITALS
BODY MASS INDEX: 33.8 KG/M2 | HEIGHT: 64 IN | WEIGHT: 198 LBS | DIASTOLIC BLOOD PRESSURE: 82 MMHG | SYSTOLIC BLOOD PRESSURE: 126 MMHG

## 2021-05-15 VITALS — HEIGHT: 64 IN | BODY MASS INDEX: 35 KG/M2 | WEIGHT: 205 LBS

## 2021-05-15 VITALS — WEIGHT: 205 LBS | HEIGHT: 64 IN | BODY MASS INDEX: 35 KG/M2

## 2021-05-15 VITALS
HEIGHT: 64 IN | SYSTOLIC BLOOD PRESSURE: 134 MMHG | WEIGHT: 205 LBS | DIASTOLIC BLOOD PRESSURE: 74 MMHG | BODY MASS INDEX: 35 KG/M2

## 2021-05-16 VITALS
SYSTOLIC BLOOD PRESSURE: 132 MMHG | WEIGHT: 202 LBS | DIASTOLIC BLOOD PRESSURE: 74 MMHG | BODY MASS INDEX: 34.49 KG/M2 | HEIGHT: 64 IN

## 2021-05-16 VITALS
TEMPERATURE: 97.6 F | DIASTOLIC BLOOD PRESSURE: 70 MMHG | HEIGHT: 64 IN | WEIGHT: 196 LBS | BODY MASS INDEX: 33.46 KG/M2 | SYSTOLIC BLOOD PRESSURE: 122 MMHG

## 2021-05-16 VITALS
DIASTOLIC BLOOD PRESSURE: 68 MMHG | BODY MASS INDEX: 33.46 KG/M2 | WEIGHT: 196 LBS | HEIGHT: 64 IN | SYSTOLIC BLOOD PRESSURE: 122 MMHG

## 2021-05-16 VITALS
HEIGHT: 64 IN | TEMPERATURE: 98.2 F | SYSTOLIC BLOOD PRESSURE: 122 MMHG | DIASTOLIC BLOOD PRESSURE: 70 MMHG | BODY MASS INDEX: 33.46 KG/M2 | WEIGHT: 196 LBS

## 2021-05-16 VITALS
DIASTOLIC BLOOD PRESSURE: 62 MMHG | BODY MASS INDEX: 32.78 KG/M2 | HEIGHT: 64 IN | WEIGHT: 192 LBS | SYSTOLIC BLOOD PRESSURE: 132 MMHG

## 2021-05-16 VITALS
DIASTOLIC BLOOD PRESSURE: 72 MMHG | SYSTOLIC BLOOD PRESSURE: 128 MMHG | HEIGHT: 64 IN | WEIGHT: 195 LBS | BODY MASS INDEX: 33.29 KG/M2

## 2021-05-22 ENCOUNTER — TRANSCRIBE ORDERS (OUTPATIENT)
Dept: ADMINISTRATIVE | Facility: HOSPITAL | Age: 61
End: 2021-05-22

## 2021-05-22 DIAGNOSIS — Z12.31 SCREENING MAMMOGRAM FOR HIGH-RISK PATIENT: Primary | ICD-10-CM

## 2021-06-05 NOTE — PROGRESS NOTES
Progress Note      Patient Name: Marilee De La Rosa   Patient ID: [id number]   Sex: Female   YOB: 1960    Primary Care Provider: Fran Renner III MD   Referring Provider: Fran Renner III MD    Visit Date: May 10, 2021    Provider: JOSE Bedolla   Location: [facility]   Location Address: [address] [place] [address]  [id number]   Location Phone: [phone]          Chief Complaint  · Follow Up Colonoscopy      History Of Present Illness  Marilee De La Rosa is a 60 year old /White female who is here to follow up colonoscopy.      Patient presents today for follow-up visit after undergoing a colonoscopy on 4/23/2021 performed by Dr. Simone Huffman. Patient was with diverticulosis of the sigmoid colon per colonoscopy. Patient denies any postoperative complications.       Past Medical History  Disease Name Date Onset Notes   Allergic rhinitis --  --    Allergic rhinitis, chronic --  --    Depression (emotion) 02/26/2019 --    High cholesterol --  --    Kidney calculus --  --    Kidney Disease --  --    Kidney Stones --  --    Medication management 02/26/2019 --    Renal cyst 7/2020 seen on ct scan 7/2020   Urinary Tract Infection 04/29/2014 --    Vaginal bleeding 04/30/2014 --          Past Surgical History  Procedure Name Date Notes   Colonoscopy --  --    Gum graft --  --    Kidney Stone Surgery, Unspecified --  --    Tubal ligation 1986 --    [place] Tooth Extraction 2001 --          Medication List  Name Date Started Instructions   fluoxetine 20 mg oral capsule 03/18/2021 TAKE 1 CAPSULE BY MOUTH ONCE DAILY FOR 90 DAYS   fluticasone propionate 50 mcg/actuation nasal spray,suspension 03/18/2021 USE 2 SPRAY(S) IN EACH NOSTRIL ONCE DAILY AS NEEDED FOR 90 DAYS   ketorolac 10 mg oral tablet 08/26/2020 take 1 tablet (10 mg) by oral route every 6 hours as needed not to exceed 40 mg in 24hrs   Mucus Relief DM  mg oral tablet  take 1 tablet by oral route every 12 hours    simvastatin 40 mg oral tablet 2021 TAKE ONE TABLET BY MOUTH ONCE DAILY IN THE EVENING FOR 90 DAYS   Stahist AD 25-60 mg oral tablet 2020 TAKE 1 TABLET BY MOUTH TWICE DAILY   Vitamin D3 1,000 unit oral capsule  take 1 capsule by oral route daily   Zyrtec 10 mg oral tablet 2019 take 1 tablet by oral route once a day (at bedtime) for 90 days         Allergy List  Allergen Name Date Reaction Notes   ibuprofen --  --  --    oxycodone --  --  2020 -        Allergies Reconciled  Family Medical History  Disease Name Relative/Age Notes   Lung Disease Mother/   --    Rectal Neoplasm, Malignant Aunt/   --    Heart Disease Father/   Father  Brother with Mitral Valve replacement   Cancer, Unspecified Brother/   Brother   Diabetes, unspecified type Sister/   Sister  Sister; Brother   Heart Attack (MI) Father/68   --    Family history of colon cancer Aunt/   Aunt/60s         Reproductive History  Menstrual   Age Menarche: 12 Age Menopause: 52   Pregnancy Summary   Total Pregnancies: 3 Full Term: 2 Premature: 0   Ab Induced: 0 Ab Spontaneous: 1 Ectopics: 0   Multiples: 0 Livin         Social History  Finding Status Start/Stop Quantity Notes   Active but no formal exercise --  --/-- --  --    Alcohol Never --/-- --  2021 -    lives with spouse --  --/-- --  --     --  --/-- --  --    Recreational Drug Use Never --/-- --  no   Tobacco Never --/-- --  never smoker   Working --  --/-- --  --          Review of Systems  · Constitutional  o Denies  o : chills, fever  · Eyes  o Denies  o : yellowish discoloration of eyes  · HENT  o Denies  o : difficulty swallowing  · Cardiovascular  o Denies  o : chest pain on exertion  · Respiratory  o Denies  o : shortness of breath  · Gastrointestinal  o Denies  o : nausea, vomiting, diarrhea, constipation  · Genitourinary  o Denies  o : abnormal color of urine  · Integument  o Denies  o : rash  · Neurologic  o Denies  o : tingling or  "numbness  · Musculoskeletal  o Denies  o : joint pain  · Endocrine  o Denies  o : weight gain, weight loss      Vitals  Date Time BP Position Site L\R Cuff Size HR RR TEMP (F) WT  HT  BMI kg/m2 BSA m2 O2 Sat FR L/min FiO2 HC       05/10/2021 09:12 AM       14  206lbs 8oz 5'  4\" 35.45 2.06             Physical Examination  · Constitutional  o Appearance  o : well developed, well-nourished, patient in no apparent distress  · Head and Face  o Head  o :   § Inspection  § : atraumatic, normocephalic  o Face  o :   § Inspection  § : no facial lesions  · Eyes  o Conjunctivae  o : conjunctivae normal  o Sclerae  o : sclerae white  · Neck  o Inspection/Palpation  o : normal appearance, no masses or tenderness, trachea midline  · Respiratory  o Respiratory Effort  o : breathing unlabored  · Skin and Subcutaneous Tissue  o General Inspection  o : no lesions present, no areas of discoloration, skin turgor normal, texture normal  · Neurologic  o Mental Status Examination  o :   § Orientation  § : grossly oriented to person, place and time  § Attention  § : attention normal, concentration abilities normal  § Fund of Knowledge  § : fund of knowledge within normal limits, patient aware of current events  o Gait and Station  o : normal gait, able to stand without difficulty  · Psychiatric  o Judgement and Insight  o : judgment and insight intact  o Mood and Affect  o : mood normal, affect appropriate              Assessment  · Diverticulosis     562.10/K57.90  · S/P colonoscopy     V45.89/Z98.890    Problems Reconciled  Plan  · Medications  o Medications have been Reconciled  o Transition of Care or Provider Policy  · Instructions  o Per the AGA guidelines of 2012 patient is to follow up for colonoscopy surveillance  o Rescreen: In 10 years follow-up in the interim as needed.  o Electronically Identified Patient Education Materials Provided Electronically            Electronically Signed by: JOSE Bedolla -Author on May 10, 2021 " 09:24:18 AM

## 2021-07-15 VITALS — WEIGHT: 206.5 LBS | HEIGHT: 64 IN | BODY MASS INDEX: 35.26 KG/M2 | RESPIRATION RATE: 14 BRPM

## 2021-08-10 ENCOUNTER — HOSPITAL ENCOUNTER (OUTPATIENT)
Dept: MAMMOGRAPHY | Facility: HOSPITAL | Age: 61
Discharge: HOME | End: 2021-08-10
Admitting: FAMILY MEDICINE

## 2021-08-10 DIAGNOSIS — Z12.31 SCREENING MAMMOGRAM FOR HIGH-RISK PATIENT: ICD-10-CM

## 2021-08-10 PROCEDURE — 77063 BREAST TOMOSYNTHESIS BI: CPT

## 2021-08-10 PROCEDURE — 77067 SCR MAMMO BI INCL CAD: CPT

## 2021-08-11 DIAGNOSIS — R92.8 ABNORMAL MAMMOGRAM OF LEFT BREAST: Primary | ICD-10-CM

## 2021-09-01 PROCEDURE — U0003 INFECTIOUS AGENT DETECTION BY NUCLEIC ACID (DNA OR RNA); SEVERE ACUTE RESPIRATORY SYNDROME CORONAVIRUS 2 (SARS-COV-2) (CORONAVIRUS DISEASE [COVID-19]), AMPLIFIED PROBE TECHNIQUE, MAKING USE OF HIGH THROUGHPUT TECHNOLOGIES AS DESCRIBED BY CMS-2020-01-R: HCPCS | Performed by: FAMILY MEDICINE

## 2021-09-03 ENCOUNTER — TELEPHONE (OUTPATIENT)
Dept: FAMILY MEDICINE CLINIC | Facility: CLINIC | Age: 61
End: 2021-09-03

## 2021-09-03 NOTE — TELEPHONE ENCOUNTER
Caller: Marilee De La Rosa    Relationship: Self    Best call back number: [phone]     What test was performed: COVID    When was the test performed: 09/01/2021    Where was the test performed: IN OFFICE    Additional notes: PATIENT REQUESTS CALLBACK TO CLARIFY TEST RESULTS

## 2021-09-07 NOTE — TELEPHONE ENCOUNTER
Called and spoke to patient and instructed that not detected means that her Covid test is negative.

## 2021-09-10 ENCOUNTER — APPOINTMENT (OUTPATIENT)
Dept: MAMMOGRAPHY | Facility: HOSPITAL | Age: 61
End: 2021-09-10

## 2021-09-10 ENCOUNTER — HOSPITAL ENCOUNTER (OUTPATIENT)
Dept: ULTRASOUND IMAGING | Facility: HOSPITAL | Age: 61
Discharge: HOME | End: 2021-09-10

## 2021-09-10 ENCOUNTER — HOSPITAL ENCOUNTER (OUTPATIENT)
Dept: MAMMOGRAPHY | Facility: HOSPITAL | Age: 61
Discharge: HOME | End: 2021-09-10

## 2021-09-10 ENCOUNTER — APPOINTMENT (OUTPATIENT)
Dept: ULTRASOUND IMAGING | Facility: HOSPITAL | Age: 61
End: 2021-09-10

## 2021-09-10 DIAGNOSIS — R92.8 ABNORMAL MAMMOGRAM OF LEFT BREAST: ICD-10-CM

## 2021-09-10 PROCEDURE — 77065 DX MAMMO INCL CAD UNI: CPT

## 2021-09-10 PROCEDURE — G0279 TOMOSYNTHESIS, MAMMO: HCPCS

## 2021-09-20 ENCOUNTER — TRANSCRIBE ORDERS (OUTPATIENT)
Dept: UROLOGY | Facility: CLINIC | Age: 61
End: 2021-09-20

## 2021-09-20 ENCOUNTER — HOSPITAL ENCOUNTER (OUTPATIENT)
Dept: GENERAL RADIOLOGY | Facility: HOSPITAL | Age: 61
Discharge: HOME | End: 2021-09-20
Admitting: UROLOGY

## 2021-09-20 DIAGNOSIS — N28.89 RENAL MASS: Primary | ICD-10-CM

## 2021-09-20 DIAGNOSIS — N28.89 RENAL MASS: ICD-10-CM

## 2021-09-20 PROCEDURE — 74018 RADEX ABDOMEN 1 VIEW: CPT

## 2021-09-21 RX ORDER — CHLORCYCLIZINE HYDROCHLORIDE AND PSEUDOEPHEDRINE HYDROCHLORIDE 25; 60 MG/1; MG/1
1 TABLET ORAL 2 TIMES DAILY
COMMUNITY
Start: 2021-09-14 | End: 2022-01-05

## 2021-09-21 RX ORDER — FLUOXETINE HYDROCHLORIDE 20 MG/1
CAPSULE ORAL
COMMUNITY
Start: 2021-09-03 | End: 2022-03-22 | Stop reason: SDUPTHER

## 2021-09-21 RX ORDER — SIMVASTATIN 40 MG
TABLET ORAL
COMMUNITY
Start: 2021-06-21 | End: 2022-03-22 | Stop reason: SDUPTHER

## 2021-09-21 RX ORDER — FLUTICASONE PROPIONATE 50 MCG
2 SPRAY, SUSPENSION (ML) NASAL DAILY PRN
COMMUNITY
Start: 2021-06-21 | End: 2021-10-13

## 2021-09-22 ENCOUNTER — OFFICE VISIT (OUTPATIENT)
Dept: UROLOGY | Facility: CLINIC | Age: 61
End: 2021-09-22

## 2021-09-22 VITALS
BODY MASS INDEX: 35.99 KG/M2 | WEIGHT: 210.8 LBS | DIASTOLIC BLOOD PRESSURE: 55 MMHG | SYSTOLIC BLOOD PRESSURE: 146 MMHG | HEIGHT: 64 IN

## 2021-09-22 DIAGNOSIS — N20.0 KIDNEY STONE: Primary | ICD-10-CM

## 2021-09-22 DIAGNOSIS — N39.3 SUI (STRESS URINARY INCONTINENCE, FEMALE): ICD-10-CM

## 2021-09-22 LAB
BILIRUB BLD-MCNC: ABNORMAL MG/DL
CLARITY, POC: CLEAR
COLOR UR: YELLOW
GLUCOSE UR STRIP-MCNC: NEGATIVE MG/DL
KETONES UR QL: NEGATIVE
LEUKOCYTE EST, POC: NEGATIVE
NITRITE UR-MCNC: NEGATIVE MG/ML
PH UR: 5.5 [PH] (ref 5–8)
PROT UR STRIP-MCNC: NEGATIVE MG/DL
RBC # UR STRIP: NEGATIVE /UL
SP GR UR: 1.03 (ref 1–1.03)
UROBILINOGEN UR QL: NORMAL

## 2021-09-22 PROCEDURE — 99213 OFFICE O/P EST LOW 20 MIN: CPT | Performed by: UROLOGY

## 2021-09-22 PROCEDURE — 81003 URINALYSIS AUTO W/O SCOPE: CPT | Performed by: UROLOGY

## 2021-09-22 NOTE — PROGRESS NOTES
"Chief Complaint  Follow-up (Annual Exam KUB)    Subjective     {Problem List  Visit Diagnosis   Encounters  Notes  Medications  Labs  Result Review Imaging  Media :23}     Marilee De La Rosa presents to [facility]  States is here for follow-up for kidney stones.  She is not having any pain.  She has no complaints of stones.    She does complain of urinary incontinence associated with coughing sneezing laughing or physical activity.  She denies urinary frequency or urgency.  She states this has been going on for years.      Recent KUB shows:  CONCLUSION:      1. A moderate to large amount of formed stool is seen throughout the colon.  There may be fecal   stasis as with constipation.       2. Left nephrolithiasis is seen.       3. A distal left ureteral calculus (or calculi) cannot be excluded.       4. There is suspected hepatomegaly.     5. The patient has undergone bilateral tubal ligation.      Objective   Vital Signs:   /55   Ht 162.6 cm (64\")   Wt 95.6 kg (210 lb 12.8 oz)   BMI 36.18 kg/m²     Physical Exam  Vitals and nursing note reviewed.   Constitutional:       Appearance: Normal appearance. She is well-developed.   Pulmonary:      Effort: Pulmonary effort is normal.      Breath sounds: Normal air entry.   Neurological:      Mental Status: She is alert and oriented to person, place, and time.      Motor: Motor function is intact.   Psychiatric:         Mood and Affect: Mood normal.         Behavior: Behavior normal.        Result Review :                  Results for orders placed or performed in visit on 09/22/21   POC Urinalysis Dipstick, Automated    Specimen: Urine   Result Value Ref Range    Color Yellow Yellow, Straw, Dark Yellow, Anitha    Clarity, UA Clear Clear    Specific Gravity  1.030 1.005 - 1.030    pH, Urine 5.5 5.0 - 8.0    Leukocytes Negative Negative    Nitrite, UA Negative Negative    Protein, POC Negative Negative mg/dL    Glucose, UA Negative " Negative, 1000 mg/dL (3+) mg/dL    Ketones, UA Negative Negative    Urobilinogen, UA Normal Normal    Bilirubin Small (1+) (A) Negative    Blood, UA Negative Negative       Assessment and Plan    Diagnoses and all orders for this visit:    1. Kidney stone (Primary)  Assessment & Plan:  She is a single 5 mm left renal pelvis stone.  We will repeat KUB in 1 year.  She will call sooner if she is have any other pain or passes any other stones.    Orders:  -     POC Urinalysis Dipstick, Automated    2. CARLOS (stress urinary incontinence, female)  Assessment & Plan:  She has stress urine incontinence based on history but does not desire any treatment at this time.  She is going to work on Kegel exercises and will let me know if she would like a referral to physical therapy.        Follow Up   Return in about 1 year (around 9/22/2022) for KUB prior.  Patient was given instructions and counseling regarding her condition or for health maintenance advice. Please see specific information pulled into the AVS if appropriate.

## 2021-09-22 NOTE — ASSESSMENT & PLAN NOTE
She is a single 5 mm left renal pelvis stone.  We will repeat KUB in 1 year.  She will call sooner if she is have any other pain or passes any other stones.

## 2021-10-13 RX ORDER — FLUTICASONE PROPIONATE 50 MCG
SPRAY, SUSPENSION (ML) NASAL
Qty: 48 G | Refills: 2 | Status: SHIPPED | OUTPATIENT
Start: 2021-10-13 | End: 2022-03-22 | Stop reason: SDUPTHER

## 2022-01-05 RX ORDER — CHLORCYCLIZINE HYDROCHLORIDE AND PSEUDOEPHEDRINE HYDROCHLORIDE 25; 60 MG/1; MG/1
TABLET ORAL
Qty: 60 TABLET | Refills: 0 | Status: SHIPPED | OUTPATIENT
Start: 2022-01-05 | End: 2022-01-18

## 2022-01-18 PROCEDURE — U0004 COV-19 TEST NON-CDC HGH THRU: HCPCS | Performed by: NURSE PRACTITIONER

## 2022-03-15 ENCOUNTER — TELEPHONE (OUTPATIENT)
Dept: FAMILY MEDICINE CLINIC | Facility: CLINIC | Age: 62
End: 2022-03-15

## 2022-03-15 DIAGNOSIS — Z00.00 LABORATORY EXAM ORDERED AS PART OF ROUTINE GENERAL MEDICAL EXAMINATION: Primary | ICD-10-CM

## 2022-03-15 DIAGNOSIS — E78.2 MIXED HYPERLIPIDEMIA: ICD-10-CM

## 2022-03-15 DIAGNOSIS — Z13.220 ENCOUNTER FOR LIPID SCREENING FOR CARDIOVASCULAR DISEASE: ICD-10-CM

## 2022-03-15 DIAGNOSIS — Z79.899 MEDICATION MANAGEMENT: ICD-10-CM

## 2022-03-15 DIAGNOSIS — Z13.6 ENCOUNTER FOR LIPID SCREENING FOR CARDIOVASCULAR DISEASE: ICD-10-CM

## 2022-03-15 NOTE — TELEPHONE ENCOUNTER
Caller: Marilee De La Rosa    Relationship: Self    Best call back number: [phone]    What orders are you requesting (i.e. lab or imaging):BLOOD WORK     In what timeframe would the patient need to come in: ASAP    Where will you receive your lab/imaging services: IN OFFICE     Additional notes: PATIENT SCHEDULED FOR PHYSICAL 3/22/22 AND INTERESTED IN HAVING LABS 3/18/22 IN EARLY MORNING, REQUESTING CALL TO BE INFORMED IF NEEDED AND WHEN ORDER IS ON FILE TO HAVE DONE     No new retinal tears seen on exam.

## 2022-03-18 ENCOUNTER — LAB (OUTPATIENT)
Dept: LAB | Facility: HOSPITAL | Age: 62
End: 2022-03-18

## 2022-03-18 DIAGNOSIS — Z79.899 MEDICATION MANAGEMENT: ICD-10-CM

## 2022-03-18 DIAGNOSIS — Z00.00 LABORATORY EXAM ORDERED AS PART OF ROUTINE GENERAL MEDICAL EXAMINATION: ICD-10-CM

## 2022-03-18 DIAGNOSIS — E78.2 MIXED HYPERLIPIDEMIA: ICD-10-CM

## 2022-03-18 DIAGNOSIS — Z13.6 ENCOUNTER FOR LIPID SCREENING FOR CARDIOVASCULAR DISEASE: ICD-10-CM

## 2022-03-18 DIAGNOSIS — Z13.220 ENCOUNTER FOR LIPID SCREENING FOR CARDIOVASCULAR DISEASE: ICD-10-CM

## 2022-03-18 LAB
ALBUMIN SERPL-MCNC: 4.5 G/DL (ref 3.5–5.2)
ALBUMIN/GLOB SERPL: 1.7 G/DL
ALP SERPL-CCNC: 109 U/L (ref 39–117)
ALT SERPL W P-5'-P-CCNC: 25 U/L (ref 1–33)
ANION GAP SERPL CALCULATED.3IONS-SCNC: 8.5 MMOL/L (ref 5–15)
AST SERPL-CCNC: 21 U/L (ref 1–32)
BASOPHILS # BLD AUTO: 0.02 10*3/MM3 (ref 0–0.2)
BASOPHILS NFR BLD AUTO: 0.3 % (ref 0–1.5)
BILIRUB SERPL-MCNC: 0.4 MG/DL (ref 0–1.2)
BUN SERPL-MCNC: 17 MG/DL (ref 8–23)
BUN/CREAT SERPL: 22.7 (ref 7–25)
CALCIUM SPEC-SCNC: 9.3 MG/DL (ref 8.6–10.5)
CHLORIDE SERPL-SCNC: 102 MMOL/L (ref 98–107)
CHOLEST SERPL-MCNC: 172 MG/DL (ref 0–200)
CO2 SERPL-SCNC: 26.5 MMOL/L (ref 22–29)
CREAT SERPL-MCNC: 0.75 MG/DL (ref 0.57–1)
DEPRECATED RDW RBC AUTO: 41.4 FL (ref 37–54)
EGFRCR SERPLBLD CKD-EPI 2021: 91.3 ML/MIN/1.73
EOSINOPHIL # BLD AUTO: 0.14 10*3/MM3 (ref 0–0.4)
EOSINOPHIL NFR BLD AUTO: 2.1 % (ref 0.3–6.2)
ERYTHROCYTE [DISTWIDTH] IN BLOOD BY AUTOMATED COUNT: 13.7 % (ref 12.3–15.4)
GLOBULIN UR ELPH-MCNC: 2.7 GM/DL
GLUCOSE SERPL-MCNC: 115 MG/DL (ref 65–99)
HCT VFR BLD AUTO: 40.6 % (ref 34–46.6)
HDLC SERPL-MCNC: 35 MG/DL (ref 40–60)
HGB BLD-MCNC: 12.9 G/DL (ref 12–15.9)
IMM GRANULOCYTES # BLD AUTO: 0.02 10*3/MM3 (ref 0–0.05)
IMM GRANULOCYTES NFR BLD AUTO: 0.3 % (ref 0–0.5)
LDLC SERPL CALC-MCNC: 91 MG/DL (ref 0–100)
LDLC/HDLC SERPL: 2.33 {RATIO}
LYMPHOCYTES # BLD AUTO: 0.6 10*3/MM3 (ref 0.7–3.1)
LYMPHOCYTES NFR BLD AUTO: 8.8 % (ref 19.6–45.3)
MCH RBC QN AUTO: 26.5 PG (ref 26.6–33)
MCHC RBC AUTO-ENTMCNC: 31.8 G/DL (ref 31.5–35.7)
MCV RBC AUTO: 83.4 FL (ref 79–97)
MONOCYTES # BLD AUTO: 0.28 10*3/MM3 (ref 0.1–0.9)
MONOCYTES NFR BLD AUTO: 4.1 % (ref 5–12)
NEUTROPHILS NFR BLD AUTO: 5.73 10*3/MM3 (ref 1.7–7)
NEUTROPHILS NFR BLD AUTO: 84.4 % (ref 42.7–76)
NRBC BLD AUTO-RTO: 0 /100 WBC (ref 0–0.2)
PLATELET # BLD AUTO: 254 10*3/MM3 (ref 140–450)
PMV BLD AUTO: 11.2 FL (ref 6–12)
POTASSIUM SERPL-SCNC: 3.9 MMOL/L (ref 3.5–5.2)
PROT SERPL-MCNC: 7.2 G/DL (ref 6–8.5)
RBC # BLD AUTO: 4.87 10*6/MM3 (ref 3.77–5.28)
SODIUM SERPL-SCNC: 137 MMOL/L (ref 136–145)
TRIGL SERPL-MCNC: 277 MG/DL (ref 0–150)
TSH SERPL DL<=0.05 MIU/L-ACNC: 2.01 UIU/ML (ref 0.27–4.2)
VLDLC SERPL-MCNC: 46 MG/DL (ref 5–40)
WBC NRBC COR # BLD: 6.79 10*3/MM3 (ref 3.4–10.8)

## 2022-03-18 PROCEDURE — 80053 COMPREHEN METABOLIC PANEL: CPT

## 2022-03-18 PROCEDURE — 85025 COMPLETE CBC W/AUTO DIFF WBC: CPT

## 2022-03-18 PROCEDURE — 84443 ASSAY THYROID STIM HORMONE: CPT

## 2022-03-18 PROCEDURE — 80061 LIPID PANEL: CPT

## 2022-03-22 ENCOUNTER — OFFICE VISIT (OUTPATIENT)
Dept: FAMILY MEDICINE CLINIC | Facility: CLINIC | Age: 62
End: 2022-03-22

## 2022-03-22 VITALS
HEART RATE: 84 BPM | WEIGHT: 209 LBS | DIASTOLIC BLOOD PRESSURE: 74 MMHG | OXYGEN SATURATION: 98 % | BODY MASS INDEX: 35.68 KG/M2 | SYSTOLIC BLOOD PRESSURE: 132 MMHG | TEMPERATURE: 98.2 F | HEIGHT: 64 IN

## 2022-03-22 DIAGNOSIS — E78.2 MIXED HYPERLIPIDEMIA: ICD-10-CM

## 2022-03-22 DIAGNOSIS — E66.9 CLASS 2 OBESITY: ICD-10-CM

## 2022-03-22 DIAGNOSIS — Z00.00 LABORATORY EXAM ORDERED AS PART OF ROUTINE GENERAL MEDICAL EXAMINATION: ICD-10-CM

## 2022-03-22 DIAGNOSIS — Z13.220 ENCOUNTER FOR LIPID SCREENING FOR CARDIOVASCULAR DISEASE: ICD-10-CM

## 2022-03-22 DIAGNOSIS — F33.0 MILD EPISODE OF RECURRENT MAJOR DEPRESSIVE DISORDER: ICD-10-CM

## 2022-03-22 DIAGNOSIS — Z00.00 ANNUAL PHYSICAL EXAM: Primary | ICD-10-CM

## 2022-03-22 DIAGNOSIS — Z13.6 ENCOUNTER FOR LIPID SCREENING FOR CARDIOVASCULAR DISEASE: ICD-10-CM

## 2022-03-22 DIAGNOSIS — Z79.899 MEDICATION MANAGEMENT: ICD-10-CM

## 2022-03-22 DIAGNOSIS — J30.1 SEASONAL ALLERGIC RHINITIS DUE TO POLLEN: ICD-10-CM

## 2022-03-22 PROBLEM — E78.5 HYPERLIPIDEMIA: Status: ACTIVE | Noted: 2022-03-22

## 2022-03-22 PROBLEM — J30.9 ALLERGIC RHINITIS: Status: ACTIVE | Noted: 2022-03-22

## 2022-03-22 PROBLEM — F32.A DEPRESSION: Status: ACTIVE | Noted: 2022-03-22

## 2022-03-22 PROBLEM — E66.812 CLASS 2 OBESITY: Status: ACTIVE | Noted: 2022-03-22

## 2022-03-22 LAB
BILIRUB BLD-MCNC: NEGATIVE MG/DL
CLARITY, POC: CLEAR
COLOR UR: YELLOW
EXPIRATION DATE: NORMAL
GLUCOSE UR STRIP-MCNC: NEGATIVE MG/DL
KETONES UR QL: NEGATIVE
LEUKOCYTE EST, POC: NEGATIVE
Lab: NORMAL
NITRITE UR-MCNC: NEGATIVE MG/ML
PH UR: 5.5 [PH] (ref 5–8)
PROT UR STRIP-MCNC: NEGATIVE MG/DL
RBC # UR STRIP: NEGATIVE /UL
SP GR UR: 1.03 (ref 1–1.03)
UROBILINOGEN UR QL: NORMAL

## 2022-03-22 PROCEDURE — 99396 PREV VISIT EST AGE 40-64: CPT | Performed by: FAMILY MEDICINE

## 2022-03-22 PROCEDURE — 81003 URINALYSIS AUTO W/O SCOPE: CPT | Performed by: FAMILY MEDICINE

## 2022-03-22 RX ORDER — MELATONIN: COMMUNITY

## 2022-03-22 RX ORDER — CHLORCYCLIZINE HYDROCHLORIDE AND PSEUDOEPHEDRINE HYDROCHLORIDE 25; 60 MG/5ML; MG/5ML
LIQUID ORAL
COMMUNITY
End: 2022-03-22 | Stop reason: SDUPTHER

## 2022-03-22 RX ORDER — FLUOXETINE HYDROCHLORIDE 20 MG/1
20 CAPSULE ORAL DAILY
Qty: 90 CAPSULE | Refills: 3 | Status: SHIPPED | OUTPATIENT
Start: 2022-03-22 | End: 2023-03-23 | Stop reason: SDUPTHER

## 2022-03-22 RX ORDER — SIMVASTATIN 40 MG
40 TABLET ORAL NIGHTLY
Qty: 90 TABLET | Refills: 3 | Status: SHIPPED | OUTPATIENT
Start: 2022-03-22 | End: 2023-02-27

## 2022-03-22 RX ORDER — FLUTICASONE PROPIONATE 50 MCG
2 SPRAY, SUSPENSION (ML) NASAL DAILY PRN
Qty: 48 G | Refills: 3 | Status: SHIPPED | OUTPATIENT
Start: 2022-03-22 | End: 2023-02-27

## 2022-03-22 NOTE — PROGRESS NOTES
"Chief Complaint  Annual Exam    SUBJECTIVE  Marilee De La Rosa presents to [facility]    61-year-old obesity increased cholesterol and kidney stoneHere for her physical    PAST MEDICAL HISTORY  No Known Allergies     Past Surgical History:   • COLONOSCOPY   • GUM SURGERY    gum graft   • KIDNEY STONE SURGERY    unspecified   • TUBAL ABDOMINAL LIGATION   • WISDOM TOOTH EXTRACTION       Social History     Tobacco Use   • Smoking status: Never Smoker   • Smokeless tobacco: Never Used   Substance Use Topics   • Alcohol use: Never       Family History   Problem Relation Age of Onset   • Lung disease Mother    • Asthma Mother    • Heart disease Father    • Heart attack Father 68   • Diabetes Sister    • Cancer Sister         precancer   • Heart disease Brother         Brother with Mitral Valve Replacement   • Cancer Brother         passed away   • Rectal cancer Other    • Colon cancer Other         60s   • Cancer Sister         precancer   • COPD Sister         passed away   • Diabetes Sister         passed away   • Heart disease Sister         Health Maintenance Due   Topic Date Due   • HEPATITIS C SCREENING  Never done        Last Completed Colonoscopy          COLORECTAL CANCER SCREENING (COLONOSCOPY - Every 10 Years) Next due on 4/23/2031 04/23/2021  COLONOSCOPY (Done - normal)    12/28/2011  COLONOSCOPY (Done)                REVIEW OF SYSTEMS    Cardiovascular no chest pain no palpitations  Respiratory no shortness of breath no dyspnea exertion discussed exercise at length  GIColonoscopy 21 not due again till 2031 discussed plant-based diet discussed losing 15 pounds  Endocrinologic fasting blood sugar 113 this is prediabetes discussed weight loss and exercise    OBJECTIVE  Vitals:    03/22/22 1011   BP: 132/74   Pulse: 84   Temp: 98.2 °F (36.8 °C)   SpO2: 98%   Weight: 94.8 kg (209 lb)   Height: 162.6 cm (64\")     Body mass index is 35.87 kg/m².    PHYSICAL EXAM    General no " distress  Neck no adenopathy, thyromegaly  Cardiovascular regular rhythm no murmur  Respiratory lungs clear and equal bilaterally  Abdomen soft nontender no hepatosplenomegaly  Skin no cancerous or precancerous lesions    ASSESSMENT & PLAN  Diagnoses and all orders for this visit:    1. Annual physical exam (Primary)  -     POC Urinalysis Dipstick, Automated    2. Medication management    3. Encounter for lipid screening for cardiovascular disease    4. Laboratory exam ordered as part of routine general medical examination  -     POC Urinalysis Dipstick, Automated    5. Mixed hyperlipidemia    6. Class 2 obesity    7. Mild episode of recurrent major depressive disorder (HCC)    8. Seasonal allergic rhinitis due to pollen          Hyperlipidemia well-controlled on simvastatin EGD plant-based diet lose 15 pounds increase exercise prediabetes fasting blood sugar 113 lose 15 pounds and exercise 30 minutes a day 5 days a week all blood work reviewed depression well controlled on fluoxetine continue fluoxetine when he            Patient was given instructions and counseling regarding her condition or for health maintenance advice. Please see specific information pulled into the AVS if appropriate.

## 2022-04-08 DIAGNOSIS — E78.2 MIXED HYPERLIPIDEMIA: Primary | ICD-10-CM

## 2022-04-11 RX ORDER — SIMVASTATIN 40 MG
TABLET ORAL
Qty: 90 TABLET | Refills: 0 | OUTPATIENT
Start: 2022-04-11

## 2022-04-18 RX ORDER — SIMVASTATIN 40 MG
TABLET ORAL
Qty: 90 TABLET | Refills: 0 | OUTPATIENT
Start: 2022-04-18

## 2022-04-26 RX ORDER — CHLORCYCLIZINE HYDROCHLORIDE AND PSEUDOEPHEDRINE HYDROCHLORIDE 25; 60 MG/1; MG/1
TABLET ORAL
Qty: 60 TABLET | Refills: 0 | OUTPATIENT
Start: 2022-04-26

## 2022-05-02 RX ORDER — CHLORCYCLIZINE HYDROCHLORIDE AND PSEUDOEPHEDRINE HYDROCHLORIDE 25; 60 MG/1; MG/1
TABLET ORAL
Qty: 60 TABLET | Refills: 0 | Status: SHIPPED | OUTPATIENT
Start: 2022-05-02 | End: 2022-05-03 | Stop reason: SDUPTHER

## 2022-05-03 RX ORDER — CHLORCYCLIZINE HYDROCHLORIDE AND PSEUDOEPHEDRINE HYDROCHLORIDE 25; 60 MG/1; MG/1
1 TABLET ORAL 2 TIMES DAILY
Qty: 60 TABLET | Refills: 1 | Status: SHIPPED | OUTPATIENT
Start: 2022-05-03 | End: 2023-03-06 | Stop reason: SDUPTHER

## 2022-05-03 NOTE — TELEPHONE ENCOUNTER
Caller: Marilee De La Rosa    Relationship: Self    Best call back number: [phone]    Requested Prescriptions:   Requested Prescriptions     Pending Prescriptions Disp Refills   • Chlorcyclizine-Pseudoephed (Stahist AD) 25-60 MG tablet 60 tablet 0     Sig: Take 1 tablet by mouth 2 (Two) Times a Day.        Pharmacy where request should be sent: [address] [phone] [facility] [phone] FX     Additional details provided by patient: PATIENT HAS LESS THAN A 3 DAY SUPPLY    Does the patient have less than a 3 day supply:  [x] Yes  [] No    Marcela Valdes Rep   05/03/22 10:50 EDT

## 2022-05-31 RX ORDER — FLUOXETINE HYDROCHLORIDE 20 MG/1
CAPSULE ORAL
Qty: 90 CAPSULE | Refills: 0 | OUTPATIENT
Start: 2022-05-31

## 2022-06-09 ENCOUNTER — OFFICE VISIT (OUTPATIENT)
Dept: FAMILY MEDICINE CLINIC | Facility: CLINIC | Age: 62
End: 2022-06-09

## 2022-06-09 VITALS
OXYGEN SATURATION: 99 % | WEIGHT: 214 LBS | SYSTOLIC BLOOD PRESSURE: 122 MMHG | BODY MASS INDEX: 36.54 KG/M2 | HEIGHT: 64 IN | TEMPERATURE: 98.5 F | DIASTOLIC BLOOD PRESSURE: 72 MMHG | HEART RATE: 83 BPM

## 2022-06-09 DIAGNOSIS — R05.9 COUGH: Primary | ICD-10-CM

## 2022-06-09 DIAGNOSIS — J30.1 SEASONAL ALLERGIC RHINITIS DUE TO POLLEN: ICD-10-CM

## 2022-06-09 LAB
EXPIRATION DATE: NORMAL
FLUAV AG UPPER RESP QL IA.RAPID: NOT DETECTED
FLUBV AG UPPER RESP QL IA.RAPID: NOT DETECTED
INTERNAL CONTROL: NORMAL
Lab: NORMAL
SARS-COV-2 AG UPPER RESP QL IA.RAPID: NOT DETECTED

## 2022-06-09 PROCEDURE — 87428 SARSCOV & INF VIR A&B AG IA: CPT | Performed by: FAMILY MEDICINE

## 2022-06-09 PROCEDURE — 99213 OFFICE O/P EST LOW 20 MIN: CPT | Performed by: FAMILY MEDICINE

## 2022-06-09 PROCEDURE — 96372 THER/PROPH/DIAG INJ SC/IM: CPT | Performed by: FAMILY MEDICINE

## 2022-06-09 RX ORDER — PREDNISONE 20 MG/1
TABLET ORAL
Qty: 20 TABLET | Refills: 0 | Status: SHIPPED | OUTPATIENT
Start: 2022-06-09 | End: 2022-06-23

## 2022-06-09 RX ORDER — ALBUTEROL SULFATE 90 UG/1
2 AEROSOL, METERED RESPIRATORY (INHALATION) EVERY 4 HOURS PRN
Qty: 18 G | Refills: 6 | Status: SHIPPED | OUTPATIENT
Start: 2022-06-09 | End: 2023-03-23 | Stop reason: SDUPTHER

## 2022-06-09 RX ORDER — METHYLPREDNISOLONE ACETATE 80 MG/ML
80 INJECTION, SUSPENSION INTRA-ARTICULAR; INTRALESIONAL; INTRAMUSCULAR; SOFT TISSUE ONCE
Status: COMPLETED | OUTPATIENT
Start: 2022-06-09 | End: 2022-06-09

## 2022-06-09 RX ADMIN — METHYLPREDNISOLONE ACETATE 80 MG: 80 INJECTION, SUSPENSION INTRA-ARTICULAR; INTRALESIONAL; INTRAMUSCULAR; SOFT TISSUE at 11:40

## 2022-06-09 NOTE — PROGRESS NOTES
Chief Complaint  Cough (Sx started in April and has been getting worse) and URI (Congestion is worsening)    SUBJECTIVE  Marilee De La Rosa presents to [facility]    61 years old severe allergies has had congestion cough since April has gotten worse some wheezing no fever rhinitis    PAST MEDICAL HISTORY  No Known Allergies     Past Surgical History:   • COLONOSCOPY   • GUM SURGERY    gum graft   • KIDNEY STONE SURGERY    unspecified   • TUBAL ABDOMINAL LIGATION   • WISDOM TOOTH EXTRACTION       Social History     Tobacco Use   • Smoking status: Never Smoker   • Smokeless tobacco: Never Used   Substance Use Topics   • Alcohol use: Never       Family History   Problem Relation Age of Onset   • Lung disease Mother    • Asthma Mother    • Heart disease Father    • Heart attack Father 68   • Diabetes Sister    • Cancer Sister         precancer   • Heart disease Brother         Brother with Mitral Valve Replacement   • Cancer Brother         passed away   • Rectal cancer Other    • Colon cancer Other         60s   • Cancer Sister         precancer   • COPD Sister         passed away   • Diabetes Sister         passed away   • Heart disease Sister         Health Maintenance Due   Topic Date Due   • HEPATITIS C SCREENING  Never done   • COVID-19 Vaccine (4 - Booster for Moderna series) 05/09/2022        Last Completed Colonoscopy          COLORECTAL CANCER SCREENING (COLONOSCOPY - Every 10 Years) Next due on 4/23/2031 04/23/2021  COLONOSCOPY (Done - normal)    04/23/2021  SCANNED - COLONOSCOPY    12/28/2011  COLONOSCOPY (Done)    12/28/2011  SCANNED - COLONOSCOPY                REVIEW OF SYSTEMS    Respiratory no shortness of breath or dyspnea exertion  Cardiovascular no chest pain no palpitations  ENT nasal congestion very little discharge    OBJECTIVE  Vitals:    06/09/22 1053   BP: 122/72   Pulse: 83   Temp: 98.5 °F (36.9 °C)   SpO2: 99%   Weight: 97.1 kg (214 lb)   Height: 162.6  "cm (64\")     Body mass index is 36.73 kg/m².    PHYSICAL EXAM    General no distress  ENT no obvious rhinitis  Respiratory few wheezes scattered no rhonchi no rales  Cardiovascular regular rhythm no murmur    ASSESSMENT & PLAN  Diagnoses and all orders for this visit:    1. Cough (Primary)  -     Cancel: POCT SARS-CoV-2 Antigen KYLEE  -     POCT SARS-CoV-2 Antigen KYLEE + Flu    2. Seasonal allergic rhinitis due to pollen  -     methylPREDNISolone acetate (DEPO-medrol) injection 80 mg    Other orders  -     albuterol sulfate  (90 Base) MCG/ACT inhaler; Inhale 2 puffs Every 4 (Four) Hours As Needed for Wheezing.  Dispense: 18 g; Refill: 6  -     predniSONE (DELTASONE) 20 MG tablet; Take 2 tab po qd for 5 days then 1 tab po qd for 5 days  Dispense: 20 tablet; Refill: 0          Allergic rhinitis some bronchospasm start prednisone given albuterol inhaler and give a shot of Depo-Medrol to get her through this worse season of hers which is grass season            Patient was given instructions and counseling regarding her condition or for health maintenance advice. Please see specific information pulled into the AVS if appropriate.   "

## 2022-06-23 ENCOUNTER — OFFICE VISIT (OUTPATIENT)
Dept: FAMILY MEDICINE CLINIC | Facility: CLINIC | Age: 62
End: 2022-06-23

## 2022-06-23 VITALS
BODY MASS INDEX: 36.37 KG/M2 | HEART RATE: 70 BPM | WEIGHT: 213 LBS | DIASTOLIC BLOOD PRESSURE: 72 MMHG | OXYGEN SATURATION: 98 % | HEIGHT: 64 IN | TEMPERATURE: 98.1 F | SYSTOLIC BLOOD PRESSURE: 130 MMHG

## 2022-06-23 DIAGNOSIS — R49.0 HOARSENESS: ICD-10-CM

## 2022-06-23 DIAGNOSIS — R09.89 CHEST CONGESTION: ICD-10-CM

## 2022-06-23 DIAGNOSIS — J30.1 SEASONAL ALLERGIC RHINITIS DUE TO POLLEN: ICD-10-CM

## 2022-06-23 DIAGNOSIS — R05.9 COUGH: Primary | ICD-10-CM

## 2022-06-23 LAB
EXPIRATION DATE: NORMAL
INTERNAL CONTROL: NORMAL
Lab: NORMAL
SARS-COV-2 AG UPPER RESP QL IA.RAPID: NOT DETECTED
SARS-COV-2 RNA PNL SPEC NAA+PROBE: NOT DETECTED

## 2022-06-23 PROCEDURE — 87426 SARSCOV CORONAVIRUS AG IA: CPT | Performed by: FAMILY MEDICINE

## 2022-06-23 PROCEDURE — 99213 OFFICE O/P EST LOW 20 MIN: CPT | Performed by: FAMILY MEDICINE

## 2022-06-23 PROCEDURE — U0004 COV-19 TEST NON-CDC HGH THRU: HCPCS | Performed by: FAMILY MEDICINE

## 2022-06-23 RX ORDER — MONTELUKAST SODIUM 10 MG/1
10 TABLET ORAL NIGHTLY
Qty: 90 TABLET | Refills: 3 | Status: SHIPPED | OUTPATIENT
Start: 2022-06-23 | End: 2023-03-23 | Stop reason: SDUPTHER

## 2022-06-23 RX ORDER — AZELASTINE 1 MG/ML
2 SPRAY, METERED NASAL 2 TIMES DAILY
Qty: 30 ML | Refills: 12 | Status: SHIPPED | OUTPATIENT
Start: 2022-06-23 | End: 2023-03-23 | Stop reason: SDUPTHER

## 2022-06-23 NOTE — PROGRESS NOTES
Chief Complaint  Allergies, Cough, and URI    SUBJECTIVE  Marilee De La Rosa presents to [facility]    Patient 61 years old history of allergies was better with allergy shots a good bit of nasal congestion but very little is coming out occasional cough was better while she had the prednisone only taking Stahist for her allergies needs to add cetirizine in the morning Stahist at night Singulair start 10 mg Flonase restart and Astelin start    PAST MEDICAL HISTORY  No Known Allergies     Past Surgical History:   • COLONOSCOPY   • GUM SURGERY    gum graft   • KIDNEY STONE SURGERY    unspecified   • TUBAL ABDOMINAL LIGATION   • WISDOM TOOTH EXTRACTION       Social History     Tobacco Use   • Smoking status: Never Smoker   • Smokeless tobacco: Never Used   Substance Use Topics   • Alcohol use: Never       Family History   Problem Relation Age of Onset   • Lung disease Mother    • Asthma Mother    • Heart disease Father    • Heart attack Father 68   • Diabetes Sister    • Cancer Sister         precancer   • Heart disease Brother         Brother with Mitral Valve Replacement   • Cancer Brother         passed away   • Rectal cancer Other    • Colon cancer Other         60s   • Cancer Sister         precancer   • COPD Sister         passed away   • Diabetes Sister         passed away   • Heart disease Sister         Health Maintenance Due   Topic Date Due   • HEPATITIS C SCREENING  Never done   • COVID-19 Vaccine (4 - Booster for Moderna series) 05/09/2022        Last Completed Colonoscopy          COLORECTAL CANCER SCREENING (COLONOSCOPY - Every 10 Years) Next due on 4/23/2031 04/23/2021  COLONOSCOPY (Done - normal)    04/23/2021  SCANNED - COLONOSCOPY    12/28/2011  COLONOSCOPY (Done)    12/28/2011  SCANNED - COLONOSCOPY                REVIEW OF SYSTEMS    ENT nasal congestion irritated as  Respiratory no real shortness of breath no wheezing did have some wheezing  "before  Cardiovascular no chest pain no palpitations    OBJECTIVE  Vitals:    06/23/22 1012   BP: 130/72   Pulse: 70   Temp: 98.1 °F (36.7 °C)   SpO2: 98%   Weight: 96.6 kg (213 lb)   Height: 162.6 cm (64\")     Body mass index is 36.56 kg/m².    PHYSICAL EXAM    General no distress sounds nasally congested  HEENT good transillumination of frontal and maxillary sinuses does have obvious rhinitis  Respiratory lungs clear and equal bilaterally  Cardiovascular regular rhythm no murmur    ASSESSMENT & PLAN  Diagnoses and all orders for this visit:    1. Cough (Primary)  -     POCT SARS-CoV-2 Antigen KYLEE  -     COVID-19,APTIMA PANTHER(SRIKANTH),[facility], NP/OP SWAB IN UTM/VTM/SALINE TRANSPORT MEDIA,24 HR TAT - Swab, Nasopharynx  -     Ambulatory Referral to Allergy    2. Hoarseness  -     POCT SARS-CoV-2 Antigen KYLEE  -     COVID-19,APTIMA PANTHER(SRIKANTH),[facility], NP/OP SWAB IN UTM/VTM/SALINE TRANSPORT MEDIA,24 HR TAT - Swab, Nasopharynx  -     Ambulatory Referral to Allergy    3. Chest congestion  -     POCT SARS-CoV-2 Antigen KYLEE  -     COVID-19,APTIMA PANTHER(SRIKANTH),[facility], NP/OP SWAB IN UTM/VTM/SALINE TRANSPORT MEDIA,24 HR TAT - Swab, Nasopharynx    4. Seasonal allergic rhinitis due to pollen  -     POCT SARS-CoV-2 Antigen KYLEE  -     Cancel: Ambulatory Referral to Allergy  -     Ambulatory Referral to Allergy    Other orders  -     azelastine (ASTELIN) 0.1 % nasal spray; 2 sprays into the nostril(s) as directed by provider 2 (Two) Times a Day. Use in each nostril as directed  Dispense: 30 mL; Refill: 12  -     montelukast (Singulair) 10 MG tablet; Take 1 tablet by mouth Every Night.  Dispense: 90 tablet; Refill: 3          Allergic rhinitis conjunctivitis continue Stahist daily take cetirizine in the morning take Singulair start Astelin 2 sprays twice daily and Flonase 2 sprays twice daily referral to allergist needs to start shots again recheck in 2 weeks            Patient was given instructions and " counseling regarding her condition or for health maintenance advice. Please see specific information pulled into the AVS if appropriate.   Answers for HPI/ROS submitted by the patient on 6/22/2022  Please describe your symptoms.: Allergies I believe, tightness in my chest.  I wake up at night and have to use an inhaler at least once a night.  I sit up for thirty minutes until I am breathing easier.  Have you had these symptoms before?: Yes  How long have you been having these symptoms?: Greater than 2 weeks  Please list any medications you are currently taking for this condition.: Mucus relief DM (twice daily),  Fiber pill, Zyrtec (generic), Stahist AD, D3,  Simvastatin 40mg., Fluoxetine 20mg,  Please describe any probable cause for these symptoms. : Allergies  What is the primary reason for your visit?: Other

## 2022-07-07 ENCOUNTER — HOSPITAL ENCOUNTER (OUTPATIENT)
Dept: GENERAL RADIOLOGY | Facility: HOSPITAL | Age: 62
Discharge: HOME | End: 2022-07-07
Admitting: FAMILY MEDICINE

## 2022-07-07 ENCOUNTER — OFFICE VISIT (OUTPATIENT)
Dept: FAMILY MEDICINE CLINIC | Facility: CLINIC | Age: 62
End: 2022-07-07

## 2022-07-07 VITALS
DIASTOLIC BLOOD PRESSURE: 72 MMHG | TEMPERATURE: 97.7 F | BODY MASS INDEX: 36.7 KG/M2 | OXYGEN SATURATION: 97 % | HEART RATE: 86 BPM | SYSTOLIC BLOOD PRESSURE: 130 MMHG | HEIGHT: 64 IN | WEIGHT: 215 LBS

## 2022-07-07 DIAGNOSIS — R06.2 WHEEZING: Primary | ICD-10-CM

## 2022-07-07 DIAGNOSIS — R06.2 WHEEZING: ICD-10-CM

## 2022-07-07 PROCEDURE — 71046 X-RAY EXAM CHEST 2 VIEWS: CPT

## 2022-07-07 PROCEDURE — 99213 OFFICE O/P EST LOW 20 MIN: CPT | Performed by: FAMILY MEDICINE

## 2022-07-07 RX ORDER — CETIRIZINE HYDROCHLORIDE 10 MG/1
20 TABLET ORAL DAILY
COMMUNITY

## 2022-07-07 RX ORDER — BUDESONIDE AND FORMOTEROL FUMARATE DIHYDRATE 160; 4.5 UG/1; UG/1
2 AEROSOL RESPIRATORY (INHALATION)
Qty: 10.2 EACH | Refills: 12 | Status: SHIPPED | OUTPATIENT
Start: 2022-07-07 | End: 2023-03-23 | Stop reason: SDUPTHER

## 2022-07-07 NOTE — PROGRESS NOTES
Chief Complaint  Cough (2 week follow up cough after starting Astelin, Flonase and zyrtec)    SUBJECTIVE  Marilee De La Rosa presents to [facility]    Patient is doing better on the Singulair and the extra antihistamine and the Astelin as far as her nasal congestion she is still wheezing some we will start Symbicort 164.52 puffs twice daily and patient needs a referral to allergy    PAST MEDICAL HISTORY  No Known Allergies     Past Surgical History:   • COLONOSCOPY   • GUM SURGERY    gum graft   • KIDNEY STONE SURGERY    unspecified   • TUBAL ABDOMINAL LIGATION   • WISDOM TOOTH EXTRACTION       Social History     Tobacco Use   • Smoking status: Never Smoker   • Smokeless tobacco: Never Used   Substance Use Topics   • Alcohol use: Never       Family History   Problem Relation Age of Onset   • Lung disease Mother    • Asthma Mother    • Heart disease Father    • Heart attack Father 68   • Diabetes Sister    • Cancer Sister         precancer   • Heart disease Brother         Brother with Mitral Valve Replacement   • Cancer Brother         passed away   • Rectal cancer Other    • Colon cancer Other         60s   • Cancer Sister         precancer   • COPD Sister         passed away   • Diabetes Sister         passed away   • Heart disease Sister         Health Maintenance Due   Topic Date Due   • HEPATITIS C SCREENING  Never done   • COVID-19 Vaccine (4 - Booster for Moderna series) 05/09/2022        Last Completed Colonoscopy          COLORECTAL CANCER SCREENING (COLONOSCOPY - Every 10 Years) Next due on 4/23/2031 04/23/2021  SCANNED - COLONOSCOPY    04/23/2021  COLONOSCOPY (Done - normal)    12/28/2011  SCANNED - COLONOSCOPY    12/28/2011  COLONOSCOPY (Done)                REVIEW OF SYSTEMS    ENT less nasal congestion  Respiratory still some wheezing this wheeze on and off during her allergy season we will start her on Symbicort 164.52 puffs twice daily continue all the other  "allergy medicine    OBJECTIVE  Vitals:    07/07/22 1151   BP: 130/72   Pulse: 86   Temp: 97.7 °F (36.5 °C)   SpO2: 97%   Weight: 97.5 kg (215 lb)   Height: 162.6 cm (64\")     Body mass index is 36.9 kg/m².    PHYSICAL EXAM    General patient looks better  Cardiovascular regular rhythm no murmur  Respiratory still some few wheezes we will start Symbicort    ASSESSMENT & PLAN  Diagnoses and all orders for this visit:    1. Wheezing (Primary)  -     XR Chest PA & Lateral; Future    Other orders  -     budesonide-formoterol (Symbicort) 160-4.5 MCG/ACT inhaler; Inhale 2 puffs 2 (Two) Times a Day.  Dispense: 10.2 each; Refill: 12          Reactive airway disease start Symbicort needs a chest x-ray #2 allergic rhinitis conjunctivitis is better            Patient was given instructions and counseling regarding her condition or for health maintenance advice. Please see specific information pulled into the AVS if appropriate.   Answers for HPI/ROS submitted by the patient on 7/1/2022  What is the primary reason for your visit?: Shortness of Breath  Chronicity: recurrent  Onset: more than 1 month ago  Frequency: constantly  Progression since onset: unchanged  Episode duration: 30 minutes  abdominal pain: No  chest pain: No  claudication: No  coryza: Yes  ear pain: Yes  fever: No  headaches: No  hemoptysis: No  leg pain: No  leg swelling: No  neck pain: No  orthopnea: Yes  PND: Yes  rash: No  rhinorrhea: No  sore throat: No  sputum production: Yes  swollen glands: No  syncope: No  vomiting: No  wheezing: Yes  Aggravating factors: weather changes      "

## 2022-08-19 ENCOUNTER — TRANSCRIBE ORDERS (OUTPATIENT)
Dept: ADMINISTRATIVE | Facility: HOSPITAL | Age: 62
End: 2022-08-19

## 2022-08-19 DIAGNOSIS — Z12.31 SCREENING MAMMOGRAM FOR BREAST CANCER: Primary | ICD-10-CM

## 2022-10-03 DIAGNOSIS — N20.0 KIDNEY STONE: Primary | ICD-10-CM

## 2022-10-04 ENCOUNTER — TELEPHONE (OUTPATIENT)
Dept: UROLOGY | Facility: CLINIC | Age: 62
End: 2022-10-04

## 2022-10-04 NOTE — TELEPHONE ENCOUNTER
Called and spoke to pts  to have him remind her that she needs to have her KUB done by Friday of this week prior to her appt on 10/10.

## 2022-10-06 ENCOUNTER — HOSPITAL ENCOUNTER (OUTPATIENT)
Dept: GENERAL RADIOLOGY | Facility: HOSPITAL | Age: 62
Discharge: HOME | End: 2022-10-06
Admitting: UROLOGY

## 2022-10-06 DIAGNOSIS — N20.0 KIDNEY STONE: ICD-10-CM

## 2022-10-06 PROCEDURE — 74018 RADEX ABDOMEN 1 VIEW: CPT

## 2022-10-10 ENCOUNTER — OFFICE VISIT (OUTPATIENT)
Dept: UROLOGY | Facility: CLINIC | Age: 62
End: 2022-10-10

## 2022-10-10 VITALS — WEIGHT: 215 LBS | BODY MASS INDEX: 36.7 KG/M2 | HEIGHT: 64 IN | RESPIRATION RATE: 16 BRPM

## 2022-10-10 DIAGNOSIS — N20.0 KIDNEY STONE: Primary | ICD-10-CM

## 2022-10-10 LAB
BILIRUB BLD-MCNC: ABNORMAL MG/DL
CLARITY, POC: CLEAR
COLOR UR: YELLOW
EXPIRATION DATE: ABNORMAL
GLUCOSE UR STRIP-MCNC: NEGATIVE MG/DL
KETONES UR QL: NEGATIVE
LEUKOCYTE EST, POC: NEGATIVE
Lab: ABNORMAL
NITRITE UR-MCNC: NEGATIVE MG/ML
PH UR: 5.5 [PH] (ref 5–8)
PROT UR STRIP-MCNC: NEGATIVE MG/DL
RBC # UR STRIP: NEGATIVE /UL
SP GR UR: 1.03 (ref 1–1.03)
UROBILINOGEN UR QL: ABNORMAL

## 2022-10-10 PROCEDURE — 81003 URINALYSIS AUTO W/O SCOPE: CPT | Performed by: UROLOGY

## 2022-10-10 PROCEDURE — 99212 OFFICE O/P EST SF 10 MIN: CPT | Performed by: UROLOGY

## 2022-10-10 RX ORDER — EPINEPHRINE 0.3 MG/.3ML
INJECTION, SOLUTION INTRAMUSCULAR
COMMUNITY
Start: 2022-09-29

## 2022-10-10 NOTE — ASSESSMENT & PLAN NOTE
- There are a few small stones present, but they appear unchanged from her KUB 1 year ago.  - Renal condition is unchanged.  - Renal condition will be reassessed in 1 year via KUB.

## 2022-10-10 NOTE — PROGRESS NOTES
"Chief Complaint  Kidney stone    Subjective          Marilee De La Rosa presents to [facility]  History of Present Illness     The patient is here today for follow-up of renal stones and states she is doing well. She underwent a KUB which revealed 3 renal stones are still present and unchanged from her previous KUB 1 year ago. Ms. De La Rosa wonders if surgical intervention is necessary at this time.     Additionally, she reports having begun taking a fiber supplement that seems to be effective for her.     A review of systems was completed and positive findings are noted in the HPI.    Objective   Vital Signs:   Resp 16   Ht 162.6 cm (64\")   Wt 97.5 kg (215 lb)   BMI 36.90 kg/m²       Physical Exam  Vitals and nursing note reviewed.   Constitutional:       Appearance: Normal appearance. She is well-developed.   Pulmonary:      Effort: Pulmonary effort is normal.      Breath sounds: Normal air entry.   Neurological:      Mental Status: She is alert and oriented to person, place, and time.      Motor: Motor function is intact.   Psychiatric:         Mood and Affect: Mood normal.         Behavior: Behavior normal.          Result Review :   The following data was reviewed by: Griselda Mckenna MD on 10/10/2022:    Results for orders placed or performed in visit on 10/10/22   POC Urinalysis Dipstick, Automated    Specimen: Urine   Result Value Ref Range    Color Yellow Yellow, Straw, Dark Yellow, Anitha    Clarity, UA Clear Clear    Specific Gravity  1.030 1.005 - 1.030    pH, Urine 5.5 5.0 - 8.0    Leukocytes Negative Negative    Nitrite, UA Negative Negative    Protein, POC Negative Negative mg/dL    Glucose, UA Negative Negative mg/dL    Ketones, UA Negative Negative    Urobilinogen, UA 0.2 E.U./dL Normal, 0.2 E.U./dL    Bilirubin Small (1+) (A) Negative    Blood, UA Negative Negative    Lot Number [id number]     Expiration Date 82,023        Data reviewed: Radiologic studies KUB:     Study " Result    Narrative & Impression   PROCEDURE:  XR ABDOMEN KUB     COMPARISON: [facility], CT, ABDOMEN/PELVIS WITH CONTRAST, 8/24/2020, 12:02.    [facility], CR, XR ABDOMEN KUB, 9/20/2021, 16:49.     INDICATIONS:  KIDNEY STONES, OCCASSIONAL CONSTIPATION     FINDINGS:          The bowel gas pattern is unremarkable.  A small amount of stool is seen in the colon.     Left renal calcifications measuring up to 5 mm are unchanged.  No ureteral stones are evident.       Phlebolith in the left hemipelvis is unchanged.  Metal clips in the pelvis are consistent with   tubal ligation.     Bony structures appear intact.     IMPRESSION:                 Left urolithiasis, not significantly changed in comparison to 9/20/2021.            HUYEN WILLARD MD         Electronically Signed and Approved By: HUYEN WILLARD MD on 10/06/2022 at 10:51             Assessment and Plan    Diagnoses and all orders for this visit:    1. Kidney stone (Primary)  Assessment & Plan:  - There are a few small stones present, but they appear unchanged from her KUB 1 year ago.  - Renal condition is unchanged.  - Renal condition will be reassessed in 1 year via KUB.     Orders:  -     POC Urinalysis Dipstick, Automated  -     XR Abdomen KUB; Future          Follow Up       Return in about 1 year (around 10/10/2023) for KUB prior.  Patient was given instructions and counseling regarding her condition or for health maintenance advice. Please see specific information pulled into the AVS if appropriate.     Transcribed from ambient dictation for Griselda Mckenna MD by Almaz Khan.  10/10/22   17:26 EDT    Patient or patient representative verbalized consent to the visit recording.

## 2022-10-25 ENCOUNTER — HOSPITAL ENCOUNTER (OUTPATIENT)
Dept: MAMMOGRAPHY | Facility: HOSPITAL | Age: 62
Discharge: HOME | End: 2022-10-25
Admitting: FAMILY MEDICINE

## 2022-10-25 DIAGNOSIS — Z12.31 SCREENING MAMMOGRAM FOR BREAST CANCER: ICD-10-CM

## 2022-10-25 PROCEDURE — 77063 BREAST TOMOSYNTHESIS BI: CPT

## 2022-10-25 PROCEDURE — 77067 SCR MAMMO BI INCL CAD: CPT

## 2022-12-01 ENCOUNTER — TELEPHONE (OUTPATIENT)
Dept: FAMILY MEDICINE CLINIC | Facility: CLINIC | Age: 62
End: 2022-12-01

## 2022-12-01 NOTE — TELEPHONE ENCOUNTER
Caller: Marilee De La Rosa    Relationship: Self    Best call back number: [phone]    What is the best time to reach you: ANY    Who are you requesting to speak with (clinical staff, provider,  specific staff member): CLINICAL STAFF    What was the call regarding: PATIENT CALLED STATING THAT SHE IS REALLY CONGESTED AND WOULD LIKE TO KNOW WHAT SHE CAN DO OR TAKE TO HELP HER FEEL BETTER.    Do you require a callback: YES

## 2022-12-02 ENCOUNTER — TELEPHONE (OUTPATIENT)
Dept: FAMILY MEDICINE CLINIC | Facility: CLINIC | Age: 62
End: 2022-12-02

## 2022-12-02 NOTE — TELEPHONE ENCOUNTER
Pt called and stated that she was having flu like symptoms and wanted to be seen . Her PCP is  and is out .  is also out of the office today . I advised pt to got to Urgent Care .

## 2022-12-05 NOTE — TELEPHONE ENCOUNTER
Patient went to  on Thursday, she is needing a doctors note for Monday and Tuesday, return on Wednesday

## 2022-12-12 ENCOUNTER — TELEPHONE (OUTPATIENT)
Dept: FAMILY MEDICINE CLINIC | Facility: CLINIC | Age: 62
End: 2022-12-12

## 2023-02-25 DIAGNOSIS — E78.2 MIXED HYPERLIPIDEMIA: ICD-10-CM

## 2023-02-25 DIAGNOSIS — J30.1 SEASONAL ALLERGIC RHINITIS DUE TO POLLEN: Primary | ICD-10-CM

## 2023-02-27 RX ORDER — FLUTICASONE PROPIONATE 50 MCG
SPRAY, SUSPENSION (ML) NASAL
Qty: 48 G | Refills: 0 | Status: SHIPPED | OUTPATIENT
Start: 2023-02-27

## 2023-02-27 RX ORDER — SIMVASTATIN 40 MG
TABLET ORAL
Qty: 90 TABLET | Refills: 0 | Status: SHIPPED | OUTPATIENT
Start: 2023-02-27 | End: 2023-03-23 | Stop reason: SDUPTHER

## 2023-03-06 DIAGNOSIS — J30.1 SEASONAL ALLERGIC RHINITIS DUE TO POLLEN: Primary | ICD-10-CM

## 2023-03-06 RX ORDER — CHLORCYCLIZINE HYDROCHLORIDE AND PSEUDOEPHEDRINE HYDROCHLORIDE 25; 60 MG/1; MG/1
1 TABLET ORAL 2 TIMES DAILY
Qty: 60 TABLET | Refills: 1 | Status: SHIPPED | OUTPATIENT
Start: 2023-03-06

## 2023-03-14 ENCOUNTER — TELEPHONE (OUTPATIENT)
Dept: FAMILY MEDICINE CLINIC | Facility: CLINIC | Age: 63
End: 2023-03-14
Payer: COMMERCIAL

## 2023-03-14 DIAGNOSIS — Z79.899 MEDICATION MANAGEMENT: ICD-10-CM

## 2023-03-14 DIAGNOSIS — Z00.00 LABORATORY EXAM ORDERED AS PART OF ROUTINE GENERAL MEDICAL EXAMINATION: Primary | ICD-10-CM

## 2023-03-14 DIAGNOSIS — E78.2 MIXED HYPERLIPIDEMIA: ICD-10-CM

## 2023-03-14 DIAGNOSIS — E66.9 CLASS 2 OBESITY: ICD-10-CM

## 2023-03-14 NOTE — TELEPHONE ENCOUNTER
Caller: Marilee De La Rosa    Relationship to patient: Self    Best call back number: [phone]    Patient is needing: PATIENT CALLED IN ASKING FOR HER LAB ORDERS TO BE PUT IN THE SYSTEM SO SHE CAN GET THEM DONE ON Friday 3.17.23. PATIENT WANTS TO GET THE LABS DRAWN BEFORE HER APPOINTMENT NEXT WEEK WITH DR. OWEN.

## 2023-03-17 ENCOUNTER — LAB (OUTPATIENT)
Dept: LAB | Facility: HOSPITAL | Age: 63
End: 2023-03-17
Payer: COMMERCIAL

## 2023-03-17 DIAGNOSIS — Z00.00 LABORATORY EXAM ORDERED AS PART OF ROUTINE GENERAL MEDICAL EXAMINATION: ICD-10-CM

## 2023-03-17 DIAGNOSIS — E66.9 CLASS 2 OBESITY: ICD-10-CM

## 2023-03-17 DIAGNOSIS — E78.2 MIXED HYPERLIPIDEMIA: ICD-10-CM

## 2023-03-17 DIAGNOSIS — Z79.899 MEDICATION MANAGEMENT: ICD-10-CM

## 2023-03-17 LAB
ALBUMIN SERPL-MCNC: 4.2 G/DL (ref 3.5–5.2)
ALBUMIN/GLOB SERPL: 1.4 G/DL
ALP SERPL-CCNC: 105 U/L (ref 39–117)
ALT SERPL W P-5'-P-CCNC: 22 U/L (ref 1–33)
ANION GAP SERPL CALCULATED.3IONS-SCNC: 10.6 MMOL/L (ref 5–15)
AST SERPL-CCNC: 14 U/L (ref 1–32)
BASOPHILS # BLD AUTO: 0.05 10*3/MM3 (ref 0–0.2)
BASOPHILS NFR BLD AUTO: 0.5 % (ref 0–1.5)
BILIRUB SERPL-MCNC: 0.5 MG/DL (ref 0–1.2)
BUN SERPL-MCNC: 15 MG/DL (ref 8–23)
BUN/CREAT SERPL: 18.8 (ref 7–25)
CALCIUM SPEC-SCNC: 9.7 MG/DL (ref 8.6–10.5)
CHLORIDE SERPL-SCNC: 99 MMOL/L (ref 98–107)
CHOLEST SERPL-MCNC: 193 MG/DL (ref 0–200)
CO2 SERPL-SCNC: 29.4 MMOL/L (ref 22–29)
CREAT SERPL-MCNC: 0.8 MG/DL (ref 0.57–1)
DEPRECATED RDW RBC AUTO: 41.6 FL (ref 37–54)
EGFRCR SERPLBLD CKD-EPI 2021: 83.9 ML/MIN/1.73
EOSINOPHIL # BLD AUTO: 0.24 10*3/MM3 (ref 0–0.4)
EOSINOPHIL NFR BLD AUTO: 2.3 % (ref 0.3–6.2)
ERYTHROCYTE [DISTWIDTH] IN BLOOD BY AUTOMATED COUNT: 13.6 % (ref 12.3–15.4)
GLOBULIN UR ELPH-MCNC: 2.9 GM/DL
GLUCOSE SERPL-MCNC: 92 MG/DL (ref 65–99)
HCT VFR BLD AUTO: 40.7 % (ref 34–46.6)
HDLC SERPL-MCNC: 50 MG/DL (ref 40–60)
HGB BLD-MCNC: 13 G/DL (ref 12–15.9)
IMM GRANULOCYTES # BLD AUTO: 0.08 10*3/MM3 (ref 0–0.05)
IMM GRANULOCYTES NFR BLD AUTO: 0.8 % (ref 0–0.5)
LDLC SERPL CALC-MCNC: 120 MG/DL (ref 0–100)
LDLC/HDLC SERPL: 2.34 {RATIO}
LYMPHOCYTES # BLD AUTO: 2.7 10*3/MM3 (ref 0.7–3.1)
LYMPHOCYTES NFR BLD AUTO: 25.8 % (ref 19.6–45.3)
MCH RBC QN AUTO: 26.9 PG (ref 26.6–33)
MCHC RBC AUTO-ENTMCNC: 31.9 G/DL (ref 31.5–35.7)
MCV RBC AUTO: 84.1 FL (ref 79–97)
MONOCYTES # BLD AUTO: 0.76 10*3/MM3 (ref 0.1–0.9)
MONOCYTES NFR BLD AUTO: 7.3 % (ref 5–12)
NEUTROPHILS NFR BLD AUTO: 6.62 10*3/MM3 (ref 1.7–7)
NEUTROPHILS NFR BLD AUTO: 63.3 % (ref 42.7–76)
NRBC BLD AUTO-RTO: 0 /100 WBC (ref 0–0.2)
PLATELET # BLD AUTO: 277 10*3/MM3 (ref 140–450)
PMV BLD AUTO: 11.7 FL (ref 6–12)
POTASSIUM SERPL-SCNC: 4 MMOL/L (ref 3.5–5.2)
PROT SERPL-MCNC: 7.1 G/DL (ref 6–8.5)
RBC # BLD AUTO: 4.84 10*6/MM3 (ref 3.77–5.28)
SODIUM SERPL-SCNC: 139 MMOL/L (ref 136–145)
TRIGL SERPL-MCNC: 131 MG/DL (ref 0–150)
TSH SERPL DL<=0.05 MIU/L-ACNC: 1.56 UIU/ML (ref 0.27–4.2)
VLDLC SERPL-MCNC: 23 MG/DL (ref 5–40)
WBC NRBC COR # BLD: 10.45 10*3/MM3 (ref 3.4–10.8)

## 2023-03-17 PROCEDURE — 84443 ASSAY THYROID STIM HORMONE: CPT

## 2023-03-17 PROCEDURE — 36415 COLL VENOUS BLD VENIPUNCTURE: CPT

## 2023-03-17 PROCEDURE — 80053 COMPREHEN METABOLIC PANEL: CPT

## 2023-03-17 PROCEDURE — 80061 LIPID PANEL: CPT

## 2023-03-17 PROCEDURE — 85025 COMPLETE CBC W/AUTO DIFF WBC: CPT

## 2023-03-23 ENCOUNTER — OFFICE VISIT (OUTPATIENT)
Dept: FAMILY MEDICINE CLINIC | Facility: CLINIC | Age: 63
End: 2023-03-23
Payer: COMMERCIAL

## 2023-03-23 VITALS
TEMPERATURE: 97.5 F | HEIGHT: 64 IN | BODY MASS INDEX: 37.56 KG/M2 | SYSTOLIC BLOOD PRESSURE: 130 MMHG | OXYGEN SATURATION: 98 % | DIASTOLIC BLOOD PRESSURE: 74 MMHG | WEIGHT: 220 LBS | HEART RATE: 76 BPM

## 2023-03-23 DIAGNOSIS — Z00.00 LABORATORY EXAM ORDERED AS PART OF ROUTINE GENERAL MEDICAL EXAMINATION: ICD-10-CM

## 2023-03-23 DIAGNOSIS — Z79.899 MEDICATION MANAGEMENT: ICD-10-CM

## 2023-03-23 DIAGNOSIS — F33.0 MILD EPISODE OF RECURRENT MAJOR DEPRESSIVE DISORDER: ICD-10-CM

## 2023-03-23 DIAGNOSIS — E78.2 MIXED HYPERLIPIDEMIA: ICD-10-CM

## 2023-03-23 DIAGNOSIS — Z00.00 ANNUAL PHYSICAL EXAM: Primary | ICD-10-CM

## 2023-03-23 DIAGNOSIS — J30.1 SEASONAL ALLERGIC RHINITIS DUE TO POLLEN: ICD-10-CM

## 2023-03-23 DIAGNOSIS — E66.9 CLASS 2 OBESITY: ICD-10-CM

## 2023-03-23 LAB
BILIRUB BLD-MCNC: NEGATIVE MG/DL
CLARITY, POC: CLEAR
COLOR UR: YELLOW
EXPIRATION DATE: ABNORMAL
GLUCOSE UR STRIP-MCNC: NEGATIVE MG/DL
KETONES UR QL: NEGATIVE
LEUKOCYTE EST, POC: ABNORMAL
Lab: ABNORMAL
NITRITE UR-MCNC: NEGATIVE MG/ML
PH UR: 5.5 [PH] (ref 5–8)
PROT UR STRIP-MCNC: NEGATIVE MG/DL
RBC # UR STRIP: NEGATIVE /UL
SP GR UR: 1.03 (ref 1–1.03)
UROBILINOGEN UR QL: ABNORMAL

## 2023-03-23 RX ORDER — FLUOXETINE HYDROCHLORIDE 20 MG/1
20 CAPSULE ORAL DAILY
Qty: 90 CAPSULE | Refills: 3 | Status: SHIPPED | OUTPATIENT
Start: 2023-03-23

## 2023-03-23 RX ORDER — METHYLPREDNISOLONE ACETATE 80 MG/ML
80 INJECTION, SUSPENSION INTRA-ARTICULAR; INTRALESIONAL; INTRAMUSCULAR; SOFT TISSUE ONCE
Status: COMPLETED | OUTPATIENT
Start: 2023-03-23 | End: 2023-03-23

## 2023-03-23 RX ORDER — BUDESONIDE AND FORMOTEROL FUMARATE DIHYDRATE 160; 4.5 UG/1; UG/1
2 AEROSOL RESPIRATORY (INHALATION)
Qty: 10.2 EACH | Refills: 12 | Status: SHIPPED | OUTPATIENT
Start: 2023-03-23

## 2023-03-23 RX ORDER — SIMVASTATIN 40 MG
40 TABLET ORAL NIGHTLY
Qty: 90 TABLET | Refills: 3 | Status: SHIPPED | OUTPATIENT
Start: 2023-03-23

## 2023-03-23 RX ORDER — AZELASTINE 1 MG/ML
2 SPRAY, METERED NASAL 2 TIMES DAILY
Qty: 30 ML | Refills: 12 | Status: SHIPPED | OUTPATIENT
Start: 2023-03-23

## 2023-03-23 RX ORDER — ALBUTEROL SULFATE 90 UG/1
2 AEROSOL, METERED RESPIRATORY (INHALATION) EVERY 4 HOURS PRN
Qty: 18 G | Refills: 6 | Status: SHIPPED | OUTPATIENT
Start: 2023-03-23

## 2023-03-23 RX ORDER — MONTELUKAST SODIUM 10 MG/1
10 TABLET ORAL NIGHTLY
Qty: 90 TABLET | Refills: 3 | Status: SHIPPED | OUTPATIENT
Start: 2023-03-23

## 2023-03-23 RX ADMIN — METHYLPREDNISOLONE ACETATE 80 MG: 80 INJECTION, SUSPENSION INTRA-ARTICULAR; INTRALESIONAL; INTRAMUSCULAR; SOFT TISSUE at 11:30

## 2023-03-23 NOTE — PROGRESS NOTES
Chief Complaint  Annual Exam and Med Management    SUBJECTIVE  Marilee De La Rosa presents to [facility]    Patient is a 62 years old severe allergic rhinitis this is her worst season elevated cholesterolOn simvastatin mild generalized anxiety and depression on Prozac obesity discussed weight loss plant-based diet history of kidney stones sees Dr. Neri discussed minimizing kidney stones with more fluid and less protein    PAST MEDICAL HISTORY  No Known Allergies     Past Surgical History:   • COLONOSCOPY   • GUM SURGERY    gum graft   • KIDNEY STONE SURGERY    unspecified   • TUBAL ABDOMINAL LIGATION   • WISDOM TOOTH EXTRACTION       Social History     Tobacco Use   • Smoking status: Never   • Smokeless tobacco: Never   Substance Use Topics   • Alcohol use: Never       Family History   Problem Relation Age of Onset   • Lung disease Mother    • Asthma Mother    • Heart disease Father    • Heart attack Father 68   • Diabetes Sister    • Cancer Sister         precancer   • Heart disease Brother         Brother with Mitral Valve Replacement   • Cancer Brother         passed away   • Rectal cancer Other    • Colon cancer Other         60s   • Cancer Sister         precancer   • COPD Sister         passed away   • Diabetes Sister         passed away   • Heart disease Sister         Health Maintenance Due   Topic Date Due   • HEPATITIS C SCREENING  Never done   • COVID-19 Vaccine (4 - Booster for Moderna series) 03/06/2022        Last Completed Colonoscopy          COLORECTAL CANCER SCREENING (COLONOSCOPY - Every 10 Years) Next due on 4/23/2031 04/23/2021  SCANNED - COLONOSCOPY    04/23/2021  COLONOSCOPY (Done - normal)    12/28/2011  SCANNED - COLONOSCOPY    12/28/2011  COLONOSCOPY (Done)                REVIEW OF SYSTEMS    GI colonoscopy 2021 due again in 2031 discussed plant-based  Cardiovascular no chest pain no palpitations  ENT severe allergic rhinitis eyes or itching we will  "give Depo-Medrol this is her worst season and tree pollen season  Respiratory no shortness of breath dyspnea on exertion    OBJECTIVE  Vitals:    03/23/23 0934   BP: 130/74   Pulse: 76   Temp: 97.5 °F (36.4 °C)   SpO2: 98%   Weight: 99.8 kg (220 lb)   Height: 162.6 cm (64\")     Body mass index is 37.76 kg/m².    PHYSICAL EXAM    General no distress  HEENT rhinitis  Lungs clear and equal bilaterally  Cardiovascular regular rhythm no murmur  Abdomen soft nontender no hepatosplenomegaly  Neurologic mentation is normal speech normal gait is normal    ASSESSMENT & PLAN  Diagnoses and all orders for this visit:    1. Annual physical exam (Primary)  -     POC Urinalysis Dipstick, Automated    2. Mixed hyperlipidemia  -     simvastatin (ZOCOR) 40 MG tablet; Take 1 tablet by mouth Every Night.  Dispense: 90 tablet; Refill: 3    3. Class 2 obesity    4. Medication management  -     POC Urinalysis Dipstick, Automated    5. Seasonal allergic rhinitis due to pollen  -     methylPREDNISolone acetate (DEPO-medrol) injection 80 mg    6. Mild episode of recurrent major depressive disorder (HCC)    7. Laboratory exam ordered as part of routine general medical examination  -     POC Urinalysis Dipstick, Automated    8. Body mass index (BMI) of 37.0 to 37.9 in adult    Other orders  -     albuterol sulfate  (90 Base) MCG/ACT inhaler; Inhale 2 puffs Every 4 (Four) Hours As Needed for Wheezing.  Dispense: 18 g; Refill: 6  -     azelastine (ASTELIN) 0.1 % nasal spray; 2 sprays into the nostril(s) as directed by provider 2 (Two) Times a Day. Use in each nostril as directed  Dispense: 30 mL; Refill: 12  -     budesonide-formoterol (Symbicort) 160-4.5 MCG/ACT inhaler; Inhale 2 puffs 2 (Two) Times a Day.  Dispense: 10.2 each; Refill: 12  -     FLUoxetine (PROzac) 20 MG capsule; Take 1 capsule by mouth Daily.  Dispense: 90 capsule; Refill: 3  -     montelukast (Singulair) 10 MG tablet; Take 1 tablet by mouth Every Night.  Dispense: 90 " tablet; Refill: 3          Check rhinitis continue all medicines we will give Depo-Medrol 80 #2 elevated cholesterol needs Zocor No. 3 kidney stones decrease protein increase fluids 4 obesity decrease weight plant-based            Patient was given instructions and counseling regarding her condition or for health maintenance advice. Please see specific information pulled into the AVS if appropriate.

## 2023-05-31 RX ORDER — FLUOXETINE HYDROCHLORIDE 20 MG/1
20 CAPSULE ORAL DAILY
Qty: 90 CAPSULE | Refills: 3 | OUTPATIENT
Start: 2023-05-31

## 2023-05-31 NOTE — TELEPHONE ENCOUNTER
Hub to share    All new medication refills were sent in to pharmacy on 3/23/2023 for one year.  Pt needs to call pharmacy.

## 2023-08-22 ENCOUNTER — OFFICE VISIT (OUTPATIENT)
Dept: INTERNAL MEDICINE | Facility: CLINIC | Age: 63
End: 2023-08-22
Payer: COMMERCIAL

## 2023-08-22 VITALS
RESPIRATION RATE: 18 BRPM | OXYGEN SATURATION: 98 % | HEIGHT: 64 IN | TEMPERATURE: 97.7 F | DIASTOLIC BLOOD PRESSURE: 70 MMHG | WEIGHT: 224 LBS | HEART RATE: 81 BPM | BODY MASS INDEX: 38.24 KG/M2 | SYSTOLIC BLOOD PRESSURE: 122 MMHG

## 2023-08-22 DIAGNOSIS — Z00.00 ENCOUNTER FOR MEDICAL EXAMINATION TO ESTABLISH CARE: Primary | ICD-10-CM

## 2023-08-22 DIAGNOSIS — F41.1 GAD (GENERALIZED ANXIETY DISORDER): ICD-10-CM

## 2023-08-22 DIAGNOSIS — R73.09 ELEVATED GLUCOSE: ICD-10-CM

## 2023-08-22 DIAGNOSIS — E55.9 VITAMIN D DEFICIENCY: ICD-10-CM

## 2023-08-22 DIAGNOSIS — E66.01 CLASS 2 SEVERE OBESITY DUE TO EXCESS CALORIES WITH SERIOUS COMORBIDITY AND BODY MASS INDEX (BMI) OF 38.0 TO 38.9 IN ADULT: ICD-10-CM

## 2023-08-22 DIAGNOSIS — J30.9 ALLERGIC RHINITIS, UNSPECIFIED SEASONALITY, UNSPECIFIED TRIGGER: ICD-10-CM

## 2023-08-22 DIAGNOSIS — E78.2 MIXED HYPERLIPIDEMIA: ICD-10-CM

## 2023-08-22 LAB
25(OH)D3 SERPL-MCNC: 26.2 NG/ML (ref 30–100)
ALBUMIN SERPL-MCNC: 4.2 G/DL (ref 3.5–5.2)
ALBUMIN/GLOB SERPL: 1.4 G/DL
ALP SERPL-CCNC: 109 U/L (ref 39–117)
ALT SERPL W P-5'-P-CCNC: 19 U/L (ref 1–33)
ANION GAP SERPL CALCULATED.3IONS-SCNC: 10 MMOL/L (ref 5–15)
AST SERPL-CCNC: 21 U/L (ref 1–32)
BILIRUB SERPL-MCNC: 0.2 MG/DL (ref 0–1.2)
BUN SERPL-MCNC: 16 MG/DL (ref 8–23)
BUN/CREAT SERPL: 19.5 (ref 7–25)
CALCIUM SPEC-SCNC: 9.4 MG/DL (ref 8.6–10.5)
CHLORIDE SERPL-SCNC: 102 MMOL/L (ref 98–107)
CHOLEST SERPL-MCNC: 175 MG/DL (ref 0–200)
CO2 SERPL-SCNC: 27 MMOL/L (ref 22–29)
CREAT SERPL-MCNC: 0.82 MG/DL (ref 0.57–1)
EGFRCR SERPLBLD CKD-EPI 2021: 81.5 ML/MIN/1.73
GLOBULIN UR ELPH-MCNC: 3.1 GM/DL
GLUCOSE SERPL-MCNC: 108 MG/DL (ref 65–99)
HBA1C MFR BLD: 6.3 % (ref 4.8–5.6)
HDLC SERPL-MCNC: 37 MG/DL (ref 40–60)
LDLC SERPL CALC-MCNC: 95 MG/DL (ref 0–100)
LDLC/HDLC SERPL: 2.37 {RATIO}
POTASSIUM SERPL-SCNC: 4 MMOL/L (ref 3.5–5.2)
PROT SERPL-MCNC: 7.3 G/DL (ref 6–8.5)
SODIUM SERPL-SCNC: 139 MMOL/L (ref 136–145)
TRIGL SERPL-MCNC: 252 MG/DL (ref 0–150)
VLDLC SERPL-MCNC: 43 MG/DL (ref 5–40)

## 2023-08-22 PROCEDURE — 99204 OFFICE O/P NEW MOD 45 MIN: CPT | Performed by: NURSE PRACTITIONER

## 2023-08-22 PROCEDURE — 83036 HEMOGLOBIN GLYCOSYLATED A1C: CPT | Performed by: NURSE PRACTITIONER

## 2023-08-22 PROCEDURE — 82306 VITAMIN D 25 HYDROXY: CPT | Performed by: NURSE PRACTITIONER

## 2023-08-22 PROCEDURE — 80061 LIPID PANEL: CPT | Performed by: NURSE PRACTITIONER

## 2023-08-22 PROCEDURE — 80053 COMPREHEN METABOLIC PANEL: CPT | Performed by: NURSE PRACTITIONER

## 2023-08-22 RX ORDER — FLUOXETINE HYDROCHLORIDE 20 MG/1
20 CAPSULE ORAL DAILY
Qty: 90 CAPSULE | Refills: 3 | Status: CANCELLED | OUTPATIENT
Start: 2023-08-22

## 2023-08-22 NOTE — PROGRESS NOTES
"Chief Complaint  Establish Care, Hyperlipidemia, and Anxiety (Taking Prozac for stress)    Subjective          Marilee De La Rosa presents to [facility]  History of Present Illness  Establish care visit  Previous PCP-Dr. Renner  Obesity-she reports eating too many sweets. 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   HLD-no leg cramps  Anxiety-reports stress with life changes prozac 20mg, reports seeing   Denies feeling down/depression  Hx of kidney stones, sees Dr. Mckenna, f/u 10/16  Elevated calcium previously causing stones  Still seeing allergy for asthma, currently doing immunotherapy, 1yr  Skin tags on chest, may want removed at some point  Objective   Vital Signs:   /70 (BP Location: Right arm, Patient Position: Sitting, Cuff Size: Large Adult)   Pulse 81   Temp 97.7 øF (36.5 øC)   Resp 18   Ht 162.6 cm (64\")   Wt 102 kg (224 lb)   SpO2 98%   BMI 38.45 kg/mý     Physical Exam  Vitals and nursing note reviewed.   Constitutional:       General: She is not in acute distress.     Appearance: Normal appearance.   HENT:      Head: Normocephalic and atraumatic.      Right Ear: External ear normal.      Left Ear: External ear normal.      Nose: Nose normal.      Mouth/Throat:      Mouth: Mucous membranes are moist.   Eyes:      Conjunctiva/sclera: Conjunctivae normal.   Cardiovascular:      Rate and Rhythm: Normal rate and regular rhythm.      Pulses: Normal pulses.      Heart sounds: Normal heart sounds. No murmur heard.    No friction rub. No gallop.   Pulmonary:      Effort: Pulmonary effort is normal. No respiratory distress.      Breath sounds: No wheezing, rhonchi or rales.   Musculoskeletal:      Cervical back: Neck supple.      Right lower leg: No edema.      Left lower leg: No edema.   Skin:     General: Skin is warm and dry.      Findings: Lesion (verrucas lesion of chest) present.   Neurological:      General: " No focal deficit present.      Mental Status: She is alert and oriented to person, place, and time.   Psychiatric:         Mood and Affect: Mood normal.         Behavior: Behavior normal.      Result Review :          Procedures      Assessment and Plan    Diagnoses and all orders for this visit:    1. Encounter for medical examination to establish care (Primary)    2. Mixed hyperlipidemia  Comments:  checkign labs today. consider adjustment if needed  Orders:  -     Comprehensive Metabolic Panel  -     Lipid Panel    3. Elevated glucose  Comments:  checking A1C today  Orders:  -     Hemoglobin A1c    4. Allergic rhinitis, unspecified seasonality, unspecified trigger  Comments:  well controlled. sees allergy for immunizations    5. Class 2 severe obesity due to excess calories with serious comorbidity and body mass index (BMI) of 38.0 to 38.9 in adult  Comments:  she will work on healthy lifestyle modifcation and increased activity. consider calorie couting with [employer] it jenny. may consider medication at follow up    6. Vitamin D deficiency  Comments:  labs today  Orders:  -     Vitamin D,25-Hydroxy    7. BUDDY (generalized anxiety disorder)  Comments:  will try trintellix at this time. r/b discussed including depression/SI    Other orders  -     Vortioxetine HBr (Trintellix) 5 MG tablet tablet; Take 1 tablet by mouth Daily With Breakfast.  Dispense: 30 tablet; Refill: 0              Follow Up   Return in about 6 weeks (around 10/3/2023).  Patient was given instructions and counseling regarding her condition or for health maintenance advice. Please see specific information pulled into the AVS if appropriate.

## 2023-08-24 RX ORDER — ERGOCALCIFEROL 1.25 MG/1
50000 CAPSULE ORAL WEEKLY
Qty: 13 CAPSULE | Refills: 1 | Status: SHIPPED | OUTPATIENT
Start: 2023-08-24

## 2023-09-19 RX ORDER — VORTIOXETINE 5 MG/1
TABLET, FILM COATED ORAL
Qty: 30 TABLET | Refills: 0 | Status: SHIPPED | OUTPATIENT
Start: 2023-09-19

## 2023-09-21 ENCOUNTER — HOSPITAL ENCOUNTER (EMERGENCY)
Facility: HOSPITAL | Age: 63
Discharge: HOME | End: 2023-09-21
Attending: EMERGENCY MEDICINE
Payer: COMMERCIAL

## 2023-09-21 ENCOUNTER — APPOINTMENT (OUTPATIENT)
Dept: GENERAL RADIOLOGY | Facility: HOSPITAL | Age: 63
End: 2023-09-21
Payer: COMMERCIAL

## 2023-09-21 VITALS
HEIGHT: 64 IN | SYSTOLIC BLOOD PRESSURE: 141 MMHG | RESPIRATION RATE: 20 BRPM | BODY MASS INDEX: 37.68 KG/M2 | WEIGHT: 220.68 LBS | HEART RATE: 69 BPM | TEMPERATURE: 97.4 F | DIASTOLIC BLOOD PRESSURE: 60 MMHG | OXYGEN SATURATION: 98 %

## 2023-09-21 DIAGNOSIS — R06.00 DYSPNEA, UNSPECIFIED TYPE: Primary | ICD-10-CM

## 2023-09-21 DIAGNOSIS — Z91.09 ENVIRONMENTAL ALLERGIES: ICD-10-CM

## 2023-09-21 LAB
ALBUMIN SERPL-MCNC: 4.2 G/DL (ref 3.5–5.2)
ALBUMIN/GLOB SERPL: 1.3 G/DL
ALP SERPL-CCNC: 109 U/L (ref 39–117)
ALT SERPL W P-5'-P-CCNC: 23 U/L (ref 1–33)
ANION GAP SERPL CALCULATED.3IONS-SCNC: 10.4 MMOL/L (ref 5–15)
AST SERPL-CCNC: 21 U/L (ref 1–32)
BASOPHILS # BLD AUTO: 0.06 10*3/MM3 (ref 0–0.2)
BASOPHILS NFR BLD AUTO: 0.8 % (ref 0–1.5)
BILIRUB SERPL-MCNC: 0.2 MG/DL (ref 0–1.2)
BUN SERPL-MCNC: 16 MG/DL (ref 8–23)
BUN/CREAT SERPL: 20.8 (ref 7–25)
CALCIUM SPEC-SCNC: 9.3 MG/DL (ref 8.6–10.5)
CHLORIDE SERPL-SCNC: 102 MMOL/L (ref 98–107)
CO2 SERPL-SCNC: 25.6 MMOL/L (ref 22–29)
CREAT SERPL-MCNC: 0.77 MG/DL (ref 0.57–1)
D DIMER PPP FEU-MCNC: <0.27 MCGFEU/ML (ref 0–0.61)
DEPRECATED RDW RBC AUTO: 45 FL (ref 37–54)
EGFRCR SERPLBLD CKD-EPI 2021: 87.9 ML/MIN/1.73
EOSINOPHIL # BLD AUTO: 0.3 10*3/MM3 (ref 0–0.4)
EOSINOPHIL NFR BLD AUTO: 4 % (ref 0.3–6.2)
ERYTHROCYTE [DISTWIDTH] IN BLOOD BY AUTOMATED COUNT: 14 % (ref 12.3–15.4)
FLUAV AG NPH QL: NEGATIVE
FLUBV AG NPH QL IA: NEGATIVE
GLOBULIN UR ELPH-MCNC: 3.2 GM/DL
GLUCOSE SERPL-MCNC: 132 MG/DL (ref 65–99)
HCT VFR BLD AUTO: 41.2 % (ref 34–46.6)
HGB BLD-MCNC: 12.4 G/DL (ref 12–15.9)
HOLD SPECIMEN: NORMAL
HOLD SPECIMEN: NORMAL
IMM GRANULOCYTES # BLD AUTO: 0.02 10*3/MM3 (ref 0–0.05)
IMM GRANULOCYTES NFR BLD AUTO: 0.3 % (ref 0–0.5)
LYMPHOCYTES # BLD AUTO: 2.11 10*3/MM3 (ref 0.7–3.1)
LYMPHOCYTES NFR BLD AUTO: 28.2 % (ref 19.6–45.3)
MCH RBC QN AUTO: 26.3 PG (ref 26.6–33)
MCHC RBC AUTO-ENTMCNC: 30.1 G/DL (ref 31.5–35.7)
MCV RBC AUTO: 87.3 FL (ref 79–97)
MONOCYTES # BLD AUTO: 0.57 10*3/MM3 (ref 0.1–0.9)
MONOCYTES NFR BLD AUTO: 7.6 % (ref 5–12)
NEUTROPHILS NFR BLD AUTO: 4.42 10*3/MM3 (ref 1.7–7)
NEUTROPHILS NFR BLD AUTO: 59.1 % (ref 42.7–76)
NRBC BLD AUTO-RTO: 0 /100 WBC (ref 0–0.2)
NT-PROBNP SERPL-MCNC: <36 PG/ML (ref 0–900)
PLATELET # BLD AUTO: 283 10*3/MM3 (ref 140–450)
PMV BLD AUTO: 10.6 FL (ref 6–12)
POTASSIUM SERPL-SCNC: 4.1 MMOL/L (ref 3.5–5.2)
PROT SERPL-MCNC: 7.4 G/DL (ref 6–8.5)
QT INTERVAL: 417 MS
QTC INTERVAL: 449 MS
RBC # BLD AUTO: 4.72 10*6/MM3 (ref 3.77–5.28)
SARS-COV-2 RNA RESP QL NAA+PROBE: NOT DETECTED
SODIUM SERPL-SCNC: 138 MMOL/L (ref 136–145)
TROPONIN T SERPL HS-MCNC: 6 NG/L
WBC NRBC COR # BLD: 7.48 10*3/MM3 (ref 3.4–10.8)
WHOLE BLOOD HOLD COAG: NORMAL
WHOLE BLOOD HOLD SPECIMEN: NORMAL

## 2023-09-21 PROCEDURE — 85025 COMPLETE CBC W/AUTO DIFF WBC: CPT

## 2023-09-21 PROCEDURE — 25010000002 METHYLPREDNISOLONE PER 125 MG: Performed by: EMERGENCY MEDICINE

## 2023-09-21 PROCEDURE — 87804 INFLUENZA ASSAY W/OPTIC: CPT | Performed by: PHYSICIAN ASSISTANT

## 2023-09-21 PROCEDURE — 83880 ASSAY OF NATRIURETIC PEPTIDE: CPT

## 2023-09-21 PROCEDURE — 94664 DEMO&/EVAL PT USE INHALER: CPT

## 2023-09-21 PROCEDURE — 71045 X-RAY EXAM CHEST 1 VIEW: CPT

## 2023-09-21 PROCEDURE — 94640 AIRWAY INHALATION TREATMENT: CPT

## 2023-09-21 PROCEDURE — 87635 SARS-COV-2 COVID-19 AMP PRB: CPT | Performed by: PHYSICIAN ASSISTANT

## 2023-09-21 PROCEDURE — 96375 TX/PRO/DX INJ NEW DRUG ADDON: CPT

## 2023-09-21 PROCEDURE — 93005 ELECTROCARDIOGRAM TRACING: CPT

## 2023-09-21 PROCEDURE — 36415 COLL VENOUS BLD VENIPUNCTURE: CPT

## 2023-09-21 PROCEDURE — 93010 ELECTROCARDIOGRAM REPORT: CPT | Performed by: INTERNAL MEDICINE

## 2023-09-21 PROCEDURE — 25010000002 ONDANSETRON PER 1 MG: Performed by: PHYSICIAN ASSISTANT

## 2023-09-21 PROCEDURE — 99284 EMERGENCY DEPT VISIT MOD MDM: CPT

## 2023-09-21 PROCEDURE — 93005 ELECTROCARDIOGRAM TRACING: CPT | Performed by: EMERGENCY MEDICINE

## 2023-09-21 PROCEDURE — 80053 COMPREHEN METABOLIC PANEL: CPT

## 2023-09-21 PROCEDURE — 85379 FIBRIN DEGRADATION QUANT: CPT | Performed by: EMERGENCY MEDICINE

## 2023-09-21 PROCEDURE — 96374 THER/PROPH/DIAG INJ IV PUSH: CPT

## 2023-09-21 PROCEDURE — 84484 ASSAY OF TROPONIN QUANT: CPT

## 2023-09-21 PROCEDURE — 94799 UNLISTED PULMONARY SVC/PX: CPT

## 2023-09-21 RX ORDER — METHYLPREDNISOLONE 4 MG/1
TABLET ORAL
Qty: 21 TABLET | Refills: 0 | Status: SHIPPED | OUTPATIENT
Start: 2023-09-21

## 2023-09-21 RX ORDER — ECHINACEA PURPUREA EXTRACT 125 MG
1 TABLET ORAL AS NEEDED
COMMUNITY

## 2023-09-21 RX ORDER — SODIUM CHLORIDE 0.9 % (FLUSH) 0.9 %
10 SYRINGE (ML) INJECTION AS NEEDED
Status: DISCONTINUED | OUTPATIENT
Start: 2023-09-21 | End: 2023-09-21 | Stop reason: HOSPADM

## 2023-09-21 RX ORDER — METHYLPREDNISOLONE SODIUM SUCCINATE 125 MG/2ML
125 INJECTION, POWDER, LYOPHILIZED, FOR SOLUTION INTRAMUSCULAR; INTRAVENOUS ONCE
Status: COMPLETED | OUTPATIENT
Start: 2023-09-21 | End: 2023-09-21

## 2023-09-21 RX ORDER — ONDANSETRON 2 MG/ML
4 INJECTION INTRAMUSCULAR; INTRAVENOUS ONCE
Status: COMPLETED | OUTPATIENT
Start: 2023-09-21 | End: 2023-09-21

## 2023-09-21 RX ORDER — GUAIFENESIN 600 MG/1
1200 TABLET, EXTENDED RELEASE ORAL 2 TIMES DAILY
COMMUNITY

## 2023-09-21 RX ORDER — IPRATROPIUM BROMIDE AND ALBUTEROL SULFATE 2.5; .5 MG/3ML; MG/3ML
6 SOLUTION RESPIRATORY (INHALATION) ONCE
Status: COMPLETED | OUTPATIENT
Start: 2023-09-21 | End: 2023-09-21

## 2023-09-21 RX ADMIN — METHYLPREDNISOLONE SODIUM SUCCINATE 125 MG: 125 INJECTION INTRAMUSCULAR; INTRAVENOUS at 13:50

## 2023-09-21 RX ADMIN — ONDANSETRON 4 MG: 2 INJECTION INTRAMUSCULAR; INTRAVENOUS at 12:30

## 2023-09-21 RX ADMIN — IPRATROPIUM BROMIDE AND ALBUTEROL SULFATE 6 ML: .5; 3 SOLUTION RESPIRATORY (INHALATION) at 13:12

## 2023-09-21 NOTE — Clinical Note
[facility] EMERGENCY ROOM  [address]  [place] [address]  Phone: [phone]    Marilee De La Rosa was seen and treated in our emergency department on 9/21/2023.  She may return to work on 09/25/2023.         Thank you for choosing [facility].    Piero Chong DO

## 2023-09-21 NOTE — ED PROVIDER NOTES
"Time: 12:08 PM EDT  Date of encounter:  9/21/2023  Independent Historian/Clinical History and Information was obtained by:   Patient    History is limited by: N/A    Chief Complaint   Patient presents with    Shortness of Breath    Nasal Congestion         History of Present Illness:  Patient is a 62 y.o. year old female who presents to the emergency department for evaluation of shortness of breath and nasal congestion.  Patient states symptoms began around 3 AM this morning.  Patient states that she took her inhaler at 3:00 and again at 5:00 with no symptom improvement and then took a nebulizer at 530.  Patient reports only mild improvement after this.  Patient describes her shortness of breath as a sensation of not getting enough air in.  Patient feels that this is related to her allergies and she states that she has \"bad allergies\".  Patient denies any cough or wheezing.  She denies any fevers or chills.        HPI    Patient Care Team  Primary Care Provider: Kecia Lu APRN    Past Medical History:     No Known Allergies  Past Medical History:   Diagnosis Date    Allergic 3/2000    took shots for six years    Allergic rhinitis     Allergies     Asthma     Chronic allergic rhinitis     Diverticulosis August 2020    Emotional depression 02/26/2019    High cholesterol     Kidney calculus     Kidney disease     Kidney stones     Medication management 02/26/2019    Renal cyst 07/2020    seen on CT scan 07/2020    Urinary tract infection 04/29/2014    Vaginal bleeding      Past Surgical History:   Procedure Laterality Date    COLONOSCOPY      GUM SURGERY      gum graft    KIDNEY STONE SURGERY      unspecified    TUBAL ABDOMINAL LIGATION  1986    WISDOM TOOTH EXTRACTION  2001     Family History   Problem Relation Age of Onset    Lung disease Mother     Asthma Mother     Heart disease Father     Heart attack Father 68    Diabetes Sister     Cancer Sister         precancer    Heart disease Brother         Brother " with Mitral Valve Replacement    Cancer Brother         passed away    Rectal cancer Other     Colon cancer Other         60s    Cancer Sister         precancer    COPD Sister         passed away    Diabetes Sister         passed away    Heart disease Sister        Home Medications:  Prior to Admission medications    Medication Sig Start Date End Date Taking? Authorizing Provider   albuterol sulfate  (90 Base) MCG/ACT inhaler Inhale 2 puffs Every 4 (Four) Hours As Needed for Wheezing. 3/23/23   Fran Renner III, MD   Auvi-Q 0.3 MG/0.3ML solution auto-injector injection  9/29/22   ProviderNoble MD   azelastine (ASTELIN) 0.1 % nasal spray 2 sprays into the nostril(s) as directed by provider 2 (Two) Times a Day. Use in each nostril as directed 3/23/23   Fran Renner III, MD   budesonide-formoterol (Symbicort) 160-4.5 MCG/ACT inhaler Inhale 2 puffs 2 (Two) Times a Day. 3/23/23   Fran Renner III, MD   cetirizine (zyrTEC) 10 MG tablet Take 2 tablets by mouth Daily.    ProviderNoble MD   Chlorcyclizine-Pseudoephed (Stahist AD) 25-60 MG tablet Take 1 tablet by mouth 2 (Two) Times a Day. 3/6/23   Fran Renner III, MD   cholecalciferol (VITAMIN D3) 25 MCG (1000 UT) tablet     ProviderNoble MD   fluticasone (FLONASE) 50 MCG/ACT nasal spray USE 2 SPRAY(S) IN EACH NOSTRIL ONCE DAILY AS NEEDED FOR RHINITIS OR ALLERGIES 7/11/23   Fran Renner III, MD   montelukast (Singulair) 10 MG tablet Take 1 tablet by mouth Every Night. 3/23/23   Fran Renner III, MD   simvastatin (ZOCOR) 40 MG tablet Take 1 tablet by mouth Every Night. 3/23/23   Fran Renner III, MD   Trintellix 5 MG tablet tablet Take 1 tablet by mouth once daily with breakfast 9/19/23   Kecia Lu APRN   vitamin D (ERGOCALCIFEROL) 1.25 MG (98337 UT) capsule capsule Take 1 capsule by mouth 1 (One) Time Per Week. 8/24/23   Aly, Kecia M, APRN        Social History:   Social History     Tobacco Use    Smoking status: Never     "Smokeless tobacco: Never   Vaping Use    Vaping Use: Never used   Substance Use Topics    Alcohol use: Never    Drug use: Never         Review of Systems:  Review of Systems   Constitutional:  Negative for chills and fever.   HENT:  Positive for congestion, sinus pressure and sinus pain. Negative for ear pain and sore throat.    Eyes:  Negative for pain.   Respiratory:  Positive for shortness of breath. Negative for cough and chest tightness.    Cardiovascular:  Negative for chest pain.   Gastrointestinal:  Positive for nausea. Negative for abdominal pain, diarrhea and vomiting.   Genitourinary:  Negative for flank pain and hematuria.   Musculoskeletal:  Negative for joint swelling.   Skin:  Negative for pallor.   Neurological:  Negative for seizures and headaches.   All other systems reviewed and are negative.     Physical Exam:  /60   Pulse 69   Temp 97.4 °F (36.3 °C) (Oral)   Resp 20   Ht 162.6 cm (64.02\")   Wt 100 kg (220 lb 10.9 oz)   SpO2 98%   BMI 37.86 kg/m²   Vital signs were reviewed under triage note.  General appearance - Patient appears well-developed and well-nourished.  Patient is in no acute distress.  Head - Normocephalic, atraumatic.  Pupils - Equal, round, reactive to light.  Extraocular muscles are intact.  Conjunctiva is clear.  Nasal - Normal inspection.  No evidence of trauma or epistaxis.  Tympanic membranes - Gray, intact without erythema or retractions.  Oral mucosa - Pink and moist without lesions or erythema.  Uvula is midline.  Chest wall - Atraumatic.  Chest wall is nontender.  There are no vesicular rashes noted.  Neck - Supple.  Trachea was midline.  There is no palpable lymphadenopathy or thyromegaly.  There are no meningeal signs  Lungs - Auscultation reveals mildly decreased breath sounds.  There is an occasional fine scattered expiratory wheeze.  There is no signs of respiratory distress.  Heart - Regular rate and rhythm without any murmurs, clicks, or " gallops.  Abdomen - Soft.  Bowel sounds are present.  There is no palpable tenderness.  There is no rebound, guarding, or rigidity.  There are no palpable masses.  There are no pulsatile masses.  Back - Spine is straight and midline.  There is no CVA tenderness.  Extremities - Intact x4 with full range of motion.  There is no palpable edema.  Pulses are intact x4 and equal.  Neurologic - Patient is awake, alert, and oriented x3.  Cranial nerves II through XII are grossly intact.  Motor and sensory functions grossly intact.  Cerebellar function was normal.  Integument - There are no rashes.  There are no petechia or purpura lesions noted.  There are no vesicular lesions noted.            Procedures:  Procedures      Medical Decision Making:      Comorbidities that affect care:    Allergic rhinitis, hypercholesterolemia, kidney stones, diverticulosis, asthma, depression    External Notes reviewed:    Previous Clinic Note: Office visit note dated 8/22/2023 by JOSE Villatoro was reviewed by me.      The following orders were placed and all results were independently analyzed by me:  Orders Placed This Encounter   Procedures    COVID-19,CEPHEID/QUINCY,COR/MARY/PAD/LEATHA/MAD IN-HOUSE(OR EMERGENT/ADD-ON),NP SWAB IN TRANSPORT MEDIA 3-4 HR TAT, RT-PCR - Swab, Nasopharynx    Influenza Antigen, Rapid - Swab, Nasopharynx    XR Chest 1 View    [place] Draw    Comprehensive Metabolic Panel    BNP    Single High Sensitivity Troponin T    CBC Auto Differential    D-dimer, Quantitative    NPO Diet NPO Type: Strict NPO    Undress & Gown    Continuous Pulse Oximetry    Vital Signs    Oxygen Therapy- Nasal Cannula; Titrate 1-6 LPM Per SpO2; 90 - 95%    ECG 12 Lead ED Triage Standing Order; SOA    Insert Peripheral IV    CBC & Differential    Green Top (Gel)    Lavender Top    Gold Top - SST    Light Blue Top       Medications Given in the Emergency Department:  Medications   sodium chloride 0.9 % flush 10 mL (has no administration in  time range)   ondansetron (ZOFRAN) injection 4 mg (4 mg Intravenous Given 9/21/23 1230)   methylPREDNISolone sodium succinate (SOLU-Medrol) injection 125 mg (125 mg Intravenous Given 9/21/23 1350)   ipratropium-albuterol (DUO-NEB) nebulizer solution 6 mL (6 mL Nebulization Given 9/21/23 1312)        ED Course:    The patient was initially evaluated in the triage area where orders were placed. The patient was later dispositioned by Piero Chong DO.      The patient was advised to stay for completion of workup which includes but is not limited to communication of labs and radiological results, reassessment and plan. The patient was advised that leaving prior to disposition by a provider could result in critical findings that are not communicated to the patient.      The patient was seen and evaluated in the ED by me.  The above history and physical examination was performed as documented.  Diagnostic data was obtained.  Results reviewed.  Discussed with the patient.  Patient was treated with IV Solu-Medrol as well as 2 DuoNeb treatments and 4 g of Zofran.  Upon repeat evaluation the patient her wheezing is resolved patient is resting comfortably.  Patient reports that she feels like she is okay to be discharged home with close outpatient treatment follow-up.    Labs:    Lab Results (last 24 hours)       Procedure Component Value Units Date/Time    CBC & Differential [[id number]]  (Abnormal) Collected: 09/21/23 0955    Specimen: Blood from Arm, Right Updated: 09/21/23 1003    Narrative:      The following orders were created for panel order CBC & Differential.  Procedure                               Abnormality         Status                     ---------                               -----------         ------                     CBC Auto Differential[[id number]]        Abnormal            Final result                 Please view results for these tests on the individual orders.    Comprehensive Metabolic Panel  [[id number]]  (Abnormal) Collected: 09/21/23 0955    Specimen: Blood from Arm, Right Updated: 09/21/23 1021     Glucose 132 mg/dL      BUN 16 mg/dL      Creatinine 0.77 mg/dL      Sodium 138 mmol/L      Potassium 4.1 mmol/L      Chloride 102 mmol/L      CO2 25.6 mmol/L      Calcium 9.3 mg/dL      Total Protein 7.4 g/dL      Albumin 4.2 g/dL      ALT (SGPT) 23 U/L      AST (SGOT) 21 U/L      Alkaline Phosphatase 109 U/L      Total Bilirubin 0.2 mg/dL      Globulin 3.2 gm/dL      A/G Ratio 1.3 g/dL      BUN/Creatinine Ratio 20.8     Anion Gap 10.4 mmol/L      eGFR 87.9 mL/min/1.73     Narrative:      GFR Normal >60  Chronic Kidney Disease <60  Kidney Failure <15      BNP [[id number]]  (Normal) Collected: 09/21/23 0955    Specimen: Blood from Arm, Right Updated: 09/21/23 1019     proBNP <36.0 pg/mL     Narrative:      Among patients with dyspnea, NT-proBNP is highly sensitive for the detection of acute congestive heart failure. In addition NT-proBNP of <300 pg/ml effectively rules out acute congestive heart failure with 99% negative predictive value.      Single High Sensitivity Troponin T [[id number]]  (Normal) Collected: 09/21/23 0955    Specimen: Blood from Arm, Right Updated: 09/21/23 1021     HS Troponin T 6 ng/L     Narrative:      High Sensitive Troponin T Reference Range:  <10.0 ng/L- Negative Female for AMI  <15.0 ng/L- Negative Male for AMI  >=10 - Abnormal Female indicating possible myocardial injury.  >=15 - Abnormal Male indicating possible myocardial injury.   Clinicians would have to utilize clinical acumen, EKG, Troponin, and serial changes to determine if it is an Acute Myocardial Infarction or myocardial injury due to an underlying chronic condition.         CBC Auto Differential [[id number]]  (Abnormal) Collected: 09/21/23 0955    Specimen: Blood from Arm, Right Updated: 09/21/23 1003     WBC 7.48 10*3/mm3      RBC 4.72 10*6/mm3      Hemoglobin 12.4 g/dL      Hematocrit 41.2 %      MCV 87.3 fL      MCH  "26.3 pg      MCHC 30.1 g/dL      RDW 14.0 %      RDW-SD 45.0 fl      MPV 10.6 fL      Platelets 283 10*3/mm3      Neutrophil % 59.1 %      Lymphocyte % 28.2 %      Monocyte % 7.6 %      Eosinophil % 4.0 %      Basophil % 0.8 %      Immature Grans % 0.3 %      Neutrophils, Absolute 4.42 10*3/mm3      Lymphocytes, Absolute 2.11 10*3/mm3      Monocytes, Absolute 0.57 10*3/mm3      Eosinophils, Absolute 0.30 10*3/mm3      Basophils, Absolute 0.06 10*3/mm3      Immature Grans, Absolute 0.02 10*3/mm3      nRBC 0.0 /100 WBC     D-dimer, Quantitative [[id number]]  (Normal) Collected: 09/21/23 0955    Specimen: Blood from Arm, Right Updated: 09/21/23 1333     D-Dimer, Quantitative <0.27 MCGFEU/mL     Narrative:      According to the assay 's published package insert, a normal (<0.50 MCGFEU/mL) D-dimer result in conjunction with a non-high clinical probability assessment, excludes deep vein thrombosis (DVT) and pulmonary embolism (PE) with high sensitivity.    D-dimer values increase with age and this can make VTE exclusion of an older population difficult. To address this, the [employer] College [employer], based on best available evidence and recent guidelines, recommends that clinicians use age-adjusted D-dimer thresholds in patients greater than 50 years of age with: a) a low probability of PE who do not meet all Pulmonary Embolism Rule Out Criteria, or b) in those with intermediate probability of PE.   The formula for an age-adjusted D-dimer cut-off is \"age/100\".  For example, a 60 year old patient would have an age-adjusted cut-off of 0.60 MCGFEU/mL and an 80 year old 0.80 MCGFEU/mL.    COVID-19,CEPHEID/QUINCY,COR/MARY/PAD/LEATHA/MAD IN-HOUSE(OR EMERGENT/ADD-ON),NP SWAB IN TRANSPORT MEDIA 3-4 HR TAT, RT-PCR - Swab, Nasopharynx [[id number]]  (Normal) Collected: 09/21/23 1215    Specimen: Swab from Nasopharynx Updated: 09/21/23 1311     COVID19 Not Detected    Narrative:      Fact sheet for providers: " https://www.fda.gov/media/168189/download     Fact sheet for patients: https://www.fda.gov/media/350422/download  Fact sheet for providers: https://www.fda.gov/media/366480/download     Fact sheet for patients: https://www.fda.gov/media/834180/download    Influenza Antigen, Rapid - Swab, Nasopharynx [[id number]]  (Normal) Collected: 09/21/23 1215    Specimen: Swab from Nasopharynx Updated: 09/21/23 1301     Influenza A Ag, EIA Negative     Influenza B Ag, EIA Negative             Imaging:    XR Chest 1 View    Result Date: 9/21/2023  PROCEDURE: XR CHEST 1 VW  COMPARISON: [facility], CR, XR CHEST PA AND LATERAL, 7/07/2022, 14:09.  INDICATIONS: SOA Triage Protocol, HA  FINDINGS:  Heart size and pulmonary vasculature within normal limits.  Lungs clear.  Costophrenic angles sharp       No active cardiopulmonary disease       KARLEY INGRAM MD       Electronically Signed and Approved By: KARLEY INGRAM MD on 9/21/2023 at 10:41                Differential Diagnosis and Discussion:      Dyspnea: Differential diagnosis includes but is not limited to metabolic acidosis, neurological disorders, psychogenic, asthma, pneumothorax, upper airway obstruction, COPD, pneumonia, noncardiogenic pulmonary edema, interstitial lung disease, anemia, congestive heart failure, and pulmonary embolism    All labs were reviewed and interpreted by me.  All X-rays impressions were independently interpreted by me.  EKG was interpreted by me.    MDM     Amount and/or Complexity of Data Reviewed  Clinical lab tests: reviewed  Tests in the radiology section of CPT®: reviewed  Tests in the medicine section of CPT®: reviewed             Patient Care Considerations:    CT CHEST: I considered ordering a CT scan of the chest, however the patient responded to initial ED treatment.  Her wheezing was resolved upon repeat evaluation.  Patient does not show any signs of acute tachycardia or hypoxia.      Consultants/Shared Management  Plan:    None    Social Determinants of Health:    Patient is independent, reliable, and has access to care.       Disposition and Care Coordination:    Discharged: I considered escalation of care by admitting this patient for observation, however the patient has improved and is suitable and  stable for discharge.    I have explained the patient´s condition, diagnoses and treatment plan based on the information available to me at this time. I have answered questions and addressed any concerns. The patient has a good  understanding of the patient´s diagnosis, condition, and treatment plan as can be expected at this point. The vital signs have been stable. The patient´s condition is stable and appropriate for discharge from the emergency department.      The patient will pursue further outpatient evaluation with the primary care physician or other designated or consulting physician as outlined in the discharge instructions. They are agreeable to this plan of care and follow-up instructions have been explained in detail. The patient has received these instructions in written format and have expressed an understanding of the discharge instructions. The patient is aware that any significant change in condition or worsening of symptoms should prompt an immediate return to this or the closest emergency department or call to 911.  I have explained discharge medications and the need for follow up with the patient/caretakers. This was also printed in the discharge instructions. Patient was discharged with the following medications and follow up:      Medication List        New Prescriptions      methylPREDNISolone 4 MG dose pack  Commonly known as: MEDROL  Take as directed on package instructions.               Where to Get Your Medications        These medications were sent to [facility] [address] [place] [address], [place] - [address] - [phone] [facility] [phone] [address], [place] [address]      Phone:  [phone]   methylPREDNISolone 4 MG dose pack      Aly Kecia M, APRN  [address] [address]  [phone]    In 1 week        Follow-up with your allergy specialist for further outpatient evaluation and treatment.          Final diagnoses:   Dyspnea, unspecified type   Environmental allergies        ED Disposition       ED Disposition   Discharge    Condition   Stable    Comment   --               This medical record created using voice recognition software.             Piero Chong DO  09/22/23 1825

## 2023-09-21 NOTE — DISCHARGE INSTRUCTIONS
Rest at home.  Avoid strenuous activities.  Use your home inhaler and/or nebulizer 4 times a day and as needed.  Take the steroid prescription as prescribed.  Start using this today.  Call and schedule follow-up with your allergy specialist next week.  Follow-up with your PCP as needed.  Return to the ER for increasing shortness of breath, fever greater than 101, or any other concerns issues that may arise.

## 2023-10-04 ENCOUNTER — OFFICE VISIT (OUTPATIENT)
Dept: INTERNAL MEDICINE | Facility: CLINIC | Age: 63
End: 2023-10-04
Payer: COMMERCIAL

## 2023-10-04 VITALS
RESPIRATION RATE: 18 BRPM | HEIGHT: 64 IN | SYSTOLIC BLOOD PRESSURE: 138 MMHG | BODY MASS INDEX: 37.59 KG/M2 | DIASTOLIC BLOOD PRESSURE: 80 MMHG | HEART RATE: 72 BPM | TEMPERATURE: 97.6 F | WEIGHT: 220.2 LBS | OXYGEN SATURATION: 97 %

## 2023-10-04 DIAGNOSIS — F33.0 MAJOR DEPRESSIVE DISORDER, RECURRENT, MILD: ICD-10-CM

## 2023-10-04 DIAGNOSIS — J45.31 MILD PERSISTENT ASTHMA WITH ACUTE EXACERBATION: Primary | ICD-10-CM

## 2023-10-04 DIAGNOSIS — R06.02 SHORTNESS OF BREATH: ICD-10-CM

## 2023-10-04 DIAGNOSIS — F41.1 GAD (GENERALIZED ANXIETY DISORDER): ICD-10-CM

## 2023-10-04 DIAGNOSIS — Z82.49 FAMILY HISTORY OF VALVULAR HEART DISEASE: ICD-10-CM

## 2023-10-04 PROCEDURE — 99214 OFFICE O/P EST MOD 30 MIN: CPT | Performed by: NURSE PRACTITIONER

## 2023-10-04 RX ORDER — CHLORCYCLIZINE HYDROCHLORIDE AND PSEUDOEPHEDRINE HYDROCHLORIDE 25; 60 MG/1; MG/1
TABLET ORAL EVERY 8 HOURS SCHEDULED
COMMUNITY

## 2023-10-04 RX ORDER — ALBUTEROL SULFATE 2.5 MG/3ML
2.5 SOLUTION RESPIRATORY (INHALATION)
COMMUNITY
Start: 2023-09-29

## 2023-10-04 RX ORDER — FLUTICASONE FUROATE, UMECLIDINIUM BROMIDE AND VILANTEROL TRIFENATATE 200; 62.5; 25 UG/1; UG/1; UG/1
1 POWDER RESPIRATORY (INHALATION) DAILY
COMMUNITY

## 2023-10-04 NOTE — PROGRESS NOTES
"Chief Complaint  Anxiety (6 week follow up ) and Allergic Rhinitis (Seasonal allergies- went  9/21/23 for allergies induce asthma )    Subjective          Marilee De La Rosa presents to [facility]  History of Present Illness  She was recently seen in the ED for soa. Given steroids. She is using albuterol about 3x per day in the last 2 wks. She is also seeing allergist. She is using affrin prior to flonase. Spirometry indicative of asthma.   She has been using symbicort as maintenance but switched to trelegy x1 mth.  Reports family hx of heart valve issues with several females in her family  Family hx of CAD  She reports SOA at times without noticing wheezing.  No pedal edema  Orthopnea reported    Anxiety-trintellix, doing well since starting this. Denies side effects  Depression improved  Denies SI       Objective   Vital Signs:   /80 (BP Location: Left arm, Patient Position: Sitting, Cuff Size: Adult)   Pulse 72   Temp 97.6 °F (36.4 °C)   Resp 18   Ht 162.6 cm (64.02\")   Wt 99.9 kg (220 lb 3.2 oz)   SpO2 97%   BMI 37.77 kg/m²     Physical Exam  Vitals and nursing note reviewed.   Constitutional:       General: She is not in acute distress.     Appearance: Normal appearance.   HENT:      Head: Normocephalic and atraumatic.      Right Ear: External ear normal.      Left Ear: External ear normal.      Nose: Nose normal.      Mouth/Throat:      Mouth: Mucous membranes are moist.   Eyes:      Conjunctiva/sclera: Conjunctivae normal.   Cardiovascular:      Rate and Rhythm: Normal rate and regular rhythm.      Pulses: Normal pulses.      Heart sounds: Normal heart sounds. No murmur heard.    No friction rub. No gallop.   Pulmonary:      Effort: Pulmonary effort is normal. No respiratory distress.      Breath sounds: No wheezing, rhonchi or rales.   Musculoskeletal:      Cervical back: Neck supple.      Right lower leg: No edema.      Left lower leg: No " edema.   Skin:     General: Skin is warm and dry.   Neurological:      General: No focal deficit present.      Mental Status: She is alert and oriented to person, place, and time.   Psychiatric:         Mood and Affect: Mood normal.         Behavior: Behavior normal.      Result Review :          Procedures      Assessment and Plan    Diagnoses and all orders for this visit:    1. Mild persistent asthma with acute exacerbation (Primary)  Comments:  She will continue with Trelegy x1 month and then switch back to Symbicort per allergist orders    2. Major depressive disorder, recurrent, mild  Comments:  Improved continue Trintellix at this time.    3. BUDDY (generalized anxiety disorder)  Comments:  Significantly improved, continue Trintellix    4. Shortness of breath  Comments:  We will get echo to rule out heart valve issue given significant family history and recent worsening with shortness of air  Orders:  -     Adult Transthoracic Echo Complete w/ Color, Spectral and Contrast if necessary per protocol; Future    5. Family history of valvular heart disease  -     Adult Transthoracic Echo Complete w/ Color, Spectral and Contrast if necessary per protocol; Future    Unclear if shortness of breath is all asthma related versus other etiology.  Discussed obtaining and maintaining control of asthma.  Discussed medication regimen and importance of med compliance.  Advised avoiding triggers.  IDALMIS as needed.  Reviewed use of controller medication versus rescue medication and MDI technique.  Advised to seek medical attention if using IDALMIS greater than 2 times per week or with nighttime awakenings greater than 2 times per month.  Patient verbalized understanding  She will follow-up in 4 to 6 weeks after echo.          Follow Up   Return in about 4 weeks (around 11/1/2023).  Patient was given instructions and counseling regarding her condition or for health maintenance advice. Please see specific information pulled into the  AVS if appropriate.

## 2023-10-13 ENCOUNTER — HOSPITAL ENCOUNTER (OUTPATIENT)
Dept: GENERAL RADIOLOGY | Facility: HOSPITAL | Age: 63
Discharge: HOME | End: 2023-10-13
Admitting: UROLOGY
Payer: COMMERCIAL

## 2023-10-13 DIAGNOSIS — N20.0 KIDNEY STONE: ICD-10-CM

## 2023-10-13 PROCEDURE — 74018 RADEX ABDOMEN 1 VIEW: CPT

## 2023-10-16 ENCOUNTER — OFFICE VISIT (OUTPATIENT)
Dept: UROLOGY | Facility: CLINIC | Age: 63
End: 2023-10-16
Payer: COMMERCIAL

## 2023-10-16 ENCOUNTER — PREP FOR SURGERY (OUTPATIENT)
Dept: OTHER | Facility: HOSPITAL | Age: 63
End: 2023-10-16
Payer: COMMERCIAL

## 2023-10-16 VITALS
HEIGHT: 64 IN | SYSTOLIC BLOOD PRESSURE: 150 MMHG | DIASTOLIC BLOOD PRESSURE: 89 MMHG | HEART RATE: 84 BPM | BODY MASS INDEX: 37.25 KG/M2 | WEIGHT: 218.2 LBS

## 2023-10-16 DIAGNOSIS — N20.0 KIDNEY STONE: Primary | ICD-10-CM

## 2023-10-16 PROCEDURE — 99214 OFFICE O/P EST MOD 30 MIN: CPT | Performed by: UROLOGY

## 2023-10-16 RX ORDER — SODIUM CHLORIDE 0.9 % (FLUSH) 0.9 %
10 SYRINGE (ML) INJECTION EVERY 12 HOURS SCHEDULED
OUTPATIENT
Start: 2023-10-16

## 2023-10-16 RX ORDER — LEVOFLOXACIN 5 MG/ML
500 INJECTION, SOLUTION INTRAVENOUS ONCE
OUTPATIENT
Start: 2023-10-16 | End: 2023-10-16

## 2023-10-16 RX ORDER — SODIUM CHLORIDE 9 MG/ML
40 INJECTION, SOLUTION INTRAVENOUS AS NEEDED
OUTPATIENT
Start: 2023-10-16

## 2023-10-16 RX ORDER — SODIUM CHLORIDE 9 MG/ML
100 INJECTION, SOLUTION INTRAVENOUS CONTINUOUS
OUTPATIENT
Start: 2023-10-16

## 2023-10-16 RX ORDER — SODIUM CHLORIDE 0.9 % (FLUSH) 0.9 %
10 SYRINGE (ML) INJECTION AS NEEDED
OUTPATIENT
Start: 2023-10-16

## 2023-10-16 NOTE — H&P (VIEW-ONLY)
[facility]   Urology HISTORY AND PHYSICAL    Patient Name: Marilee De La Rosa  : 1960  MRN: [id number]  Primary Care Physician:  Kecia Lu, JOSE  Date of admission: (Not on file)    Subjective   Subjective       History of Present Illness  Patient has three left renal stones up to 8 mm in size and presents for left ureteroscopy, laser lithotripsy and left ureteral stent placement.       Personal History     Past Medical History:   Diagnosis Date    Allergic 3/2000    took shots for six years    Allergic rhinitis     Allergies     Asthma     Chronic allergic rhinitis     Diverticulosis 2020    Emotional depression 2019    High cholesterol     Kidney calculus     Kidney disease     Kidney stones     Medication management 2019    Renal cyst 2020    seen on CT scan 2020    Urinary tract infection 2014    Vaginal bleeding        Past Surgical History:   Procedure Laterality Date    COLONOSCOPY      GUM SURGERY      gum graft    KIDNEY STONE SURGERY      unspecified    TUBAL ABDOMINAL LIGATION      WISDOM TOOTH EXTRACTION         Family History: family history includes Asthma in her mother; COPD in her sister; Cancer in her brother, sister, and sister; Colon cancer in an other family member; Diabetes in her sister and sister; Heart attack (age of onset: 68) in her father; Heart disease in her brother, father, and sister; Lung disease in her mother; Rectal cancer in an other family member. Otherwise pertinent FHx was reviewed and not pertinent to current issue.    Social History:  reports that she has never smoked. She has never used smokeless tobacco. She reports that she does not drink alcohol and does not use drugs.    Home Medications:  Chlorcyclizine-Pseudoephed, EPINEPHrine, Fluticasone-Umeclidin-Vilant, Vortioxetine HBr, albuterol, albuterol sulfate HFA, azelastine, budesonide-formoterol, cetirizine, fluticasone, guaiFENesin, methylPREDNISolone,  methylcellulose (Laxative), montelukast, simvastatin, sodium chloride, and vitamin D    Allergies:  No Known Allergies    Objective    Objective     Vitals:   Heart Rate:  [84] 84  BP: (150)/(89) 150/89    Physical Exam  Constitutional:       Appearance: Normal appearance.   Cardiovascular:      Rate and Rhythm: Normal rate and regular rhythm.   Pulmonary:      Effort: Pulmonary effort is normal.      Breath sounds: Normal breath sounds.   Neurological:      Mental Status: She is alert. Mental status is at baseline.   Psychiatric:         Mood and Affect: Mood and affect normal.         Speech: Speech normal.         Judgment: Judgment normal.         Result Review    Result Review:  I have personally reviewed the results from the time of this admission to 10/16/2023 16:25 EDT and agree with these findings:  [x]  Laboratory  []  Microbiology  [x]  Radiology  []  EKG/Telemetry   []  Cardiology/Vascular   [x]  Pathology  [x]  Old records  []  Other:      Assessment & Plan   Assessment / Plan       Active Hospital Problems:  There are no active hospital problems to display for this patient.      Plan: left ureteroscopy, laser lithotripsy and left ureteral stent placement  Risks and benefits discussed with patient and they are agreeable to proceed.    DVT prophylaxis:  No DVT prophylaxis order currently exists.    CODE STATUS:           Electronically signed by Griselda Mckenna MD, 10/16/23, 4:25 PM EDT.

## 2023-10-16 NOTE — PROGRESS NOTES
"Chief Complaint  Kidney Stone    Subjective          Marilee De La Rosa presents to [facility] UROLOGY    History of Present Illness  Patient is here for her one year follow up.  KUB shows 3 left renal stones up to 8 mm in size.  Last year the stone was up to 5 mm in size.  She has not passed any stones that she is aware of.       Objective   Vital Signs:   /89   Pulse 84   Ht 162.6 cm (64.02\")   Wt 99 kg (218 lb 3.2 oz)   BMI 37.44 kg/m²       Physical Exam  Vitals and nursing note reviewed.   Constitutional:       Appearance: Normal appearance. She is well-developed.   Pulmonary:      Effort: Pulmonary effort is normal.      Breath sounds: Normal air entry.   Neurological:      Mental Status: She is alert and oriented to person, place, and time.      Motor: Motor function is intact.   Psychiatric:         Mood and Affect: Mood normal.         Behavior: Behavior normal.          Result Review :   The following data was reviewed by: Griselda Mckenna MD on 10/16/2023:    Results    Procedure Component Value Ref Range Date/Time   XR Abdomen KUB [[id number]] Collected: 10/13/23 0959   Order Status: Completed Updated: 10/13/23 1002   Narrative:     PROCEDURE: XR ABDOMEN KUB     COMPARISON: [facility], CR, XR ABDOMEN KUB, 10/06/2022, 9:47.     INDICATIONS: renal stones,     FINDINGS:  Stable calcifications overlying the left renal shadow up to 8 mm.  No calcifications overlying the  right renal shadow or expected location of either ureter.  Stable small round calcification in the  lower left pelvis compatible with phleboliths.  Bowel gas pattern normal.  Tubal ligation clips  noted      Impression:     Stable left nephrolithiasis            KARLEY INGRAM MD        Electronically Signed and Approved By: KARLEY INGRAM MD on 10/13/2023 at 9:59                Assessment and Plan    Diagnoses and all orders for this visit:    1. Kidney stone (Primary)  -     [facility] CKD " Program; Future    She will have a left ureteroscopy, laser lithotripsy and left ureteral stent placement for her large left renal stones.  Risk and benefits discussed and she is agreeable to proceed.  We will also do a 24-hour urine collection when she recovers from.        Follow Up       No follow-ups on file.  Patient was given instructions and counseling regarding her condition or for health maintenance advice. Please see specific information pulled into the AVS if appropriate.       Medical Decision Making Visit complexity attestation:      Complexity of Visit:  67898  0 acute problems and 1 chronic problems addressed today  I reviewed 1 tests today.  I ordered 0 tests.    I scheduled patient for surgery.   Risk:  Moderate

## 2023-10-16 NOTE — H&P
[facility]   Urology HISTORY AND PHYSICAL    Patient Name: Marilee De La Rosa  : 1960  MRN: [id number]  Primary Care Physician:  Kecia Lu, JOSE  Date of admission: (Not on file)    Subjective   Subjective       History of Present Illness  Patient has three left renal stones up to 8 mm in size and presents for left ureteroscopy, laser lithotripsy and left ureteral stent placement.       Personal History     Past Medical History:   Diagnosis Date    Allergic 3/2000    took shots for six years    Allergic rhinitis     Allergies     Asthma     Chronic allergic rhinitis     Diverticulosis 2020    Emotional depression 2019    High cholesterol     Kidney calculus     Kidney disease     Kidney stones     Medication management 2019    Renal cyst 2020    seen on CT scan 2020    Urinary tract infection 2014    Vaginal bleeding        Past Surgical History:   Procedure Laterality Date    COLONOSCOPY      GUM SURGERY      gum graft    KIDNEY STONE SURGERY      unspecified    TUBAL ABDOMINAL LIGATION      WISDOM TOOTH EXTRACTION         Family History: family history includes Asthma in her mother; COPD in her sister; Cancer in her brother, sister, and sister; Colon cancer in an other family member; Diabetes in her sister and sister; Heart attack (age of onset: 68) in her father; Heart disease in her brother, father, and sister; Lung disease in her mother; Rectal cancer in an other family member. Otherwise pertinent FHx was reviewed and not pertinent to current issue.    Social History:  reports that she has never smoked. She has never used smokeless tobacco. She reports that she does not drink alcohol and does not use drugs.    Home Medications:  Chlorcyclizine-Pseudoephed, EPINEPHrine, Fluticasone-Umeclidin-Vilant, Vortioxetine HBr, albuterol, albuterol sulfate HFA, azelastine, budesonide-formoterol, cetirizine, fluticasone, guaiFENesin, methylPREDNISolone,  methylcellulose (Laxative), montelukast, simvastatin, sodium chloride, and vitamin D    Allergies:  No Known Allergies    Objective    Objective     Vitals:   Heart Rate:  [84] 84  BP: (150)/(89) 150/89    Physical Exam  Constitutional:       Appearance: Normal appearance.   Cardiovascular:      Rate and Rhythm: Normal rate and regular rhythm.   Pulmonary:      Effort: Pulmonary effort is normal.      Breath sounds: Normal breath sounds.   Neurological:      Mental Status: She is alert. Mental status is at baseline.   Psychiatric:         Mood and Affect: Mood and affect normal.         Speech: Speech normal.         Judgment: Judgment normal.         Result Review    Result Review:  I have personally reviewed the results from the time of this admission to 10/16/2023 16:25 EDT and agree with these findings:  [x]  Laboratory  []  Microbiology  [x]  Radiology  []  EKG/Telemetry   []  Cardiology/Vascular   [x]  Pathology  [x]  Old records  []  Other:      Assessment & Plan   Assessment / Plan       Active Hospital Problems:  There are no active hospital problems to display for this patient.      Plan: left ureteroscopy, laser lithotripsy and left ureteral stent placement  Risks and benefits discussed with patient and they are agreeable to proceed.    DVT prophylaxis:  No DVT prophylaxis order currently exists.    CODE STATUS:           Electronically signed by Griselda Mckenna MD, 10/16/23, 4:25 PM EDT.

## 2023-10-22 ENCOUNTER — HOSPITAL ENCOUNTER (EMERGENCY)
Facility: HOSPITAL | Age: 63
Discharge: HOME | End: 2023-10-22
Attending: EMERGENCY MEDICINE | Admitting: EMERGENCY MEDICINE
Payer: COMMERCIAL

## 2023-10-22 ENCOUNTER — APPOINTMENT (OUTPATIENT)
Dept: GENERAL RADIOLOGY | Facility: HOSPITAL | Age: 63
End: 2023-10-22
Payer: COMMERCIAL

## 2023-10-22 VITALS
OXYGEN SATURATION: 96 % | SYSTOLIC BLOOD PRESSURE: 103 MMHG | HEART RATE: 80 BPM | HEIGHT: 64 IN | DIASTOLIC BLOOD PRESSURE: 86 MMHG | TEMPERATURE: 97.9 F | WEIGHT: 212.96 LBS | BODY MASS INDEX: 36.36 KG/M2 | RESPIRATION RATE: 20 BRPM

## 2023-10-22 DIAGNOSIS — J20.9 BRONCHITIS, ACUTE, WITH BRONCHOSPASM: ICD-10-CM

## 2023-10-22 DIAGNOSIS — U07.1 COVID-19 VIRUS INFECTION: Primary | ICD-10-CM

## 2023-10-22 LAB
ALBUMIN SERPL-MCNC: 4.2 G/DL (ref 3.5–5.2)
ALBUMIN/GLOB SERPL: 1.2 G/DL
ALP SERPL-CCNC: 122 U/L (ref 39–117)
ALT SERPL W P-5'-P-CCNC: 26 U/L (ref 1–33)
ANION GAP SERPL CALCULATED.3IONS-SCNC: 12.3 MMOL/L (ref 5–15)
AST SERPL-CCNC: 23 U/L (ref 1–32)
BASOPHILS # BLD AUTO: 0.03 10*3/MM3 (ref 0–0.2)
BASOPHILS NFR BLD AUTO: 0.6 % (ref 0–1.5)
BILIRUB SERPL-MCNC: 0.2 MG/DL (ref 0–1.2)
BUN SERPL-MCNC: 13 MG/DL (ref 8–23)
BUN/CREAT SERPL: 15.5 (ref 7–25)
CALCIUM SPEC-SCNC: 9.5 MG/DL (ref 8.6–10.5)
CHLORIDE SERPL-SCNC: 102 MMOL/L (ref 98–107)
CO2 SERPL-SCNC: 23.7 MMOL/L (ref 22–29)
CREAT SERPL-MCNC: 0.84 MG/DL (ref 0.57–1)
DEPRECATED RDW RBC AUTO: 44.7 FL (ref 37–54)
EGFRCR SERPLBLD CKD-EPI 2021: 78.7 ML/MIN/1.73
EOSINOPHIL # BLD AUTO: 0.09 10*3/MM3 (ref 0–0.4)
EOSINOPHIL NFR BLD AUTO: 1.8 % (ref 0.3–6.2)
ERYTHROCYTE [DISTWIDTH] IN BLOOD BY AUTOMATED COUNT: 14.6 % (ref 12.3–15.4)
GLOBULIN UR ELPH-MCNC: 3.4 GM/DL
GLUCOSE SERPL-MCNC: 131 MG/DL (ref 65–99)
HCT VFR BLD AUTO: 45 % (ref 34–46.6)
HGB BLD-MCNC: 13.5 G/DL (ref 12–15.9)
HOLD SPECIMEN: NORMAL
HOLD SPECIMEN: NORMAL
IMM GRANULOCYTES # BLD AUTO: 0.02 10*3/MM3 (ref 0–0.05)
IMM GRANULOCYTES NFR BLD AUTO: 0.4 % (ref 0–0.5)
LYMPHOCYTES # BLD AUTO: 1.94 10*3/MM3 (ref 0.7–3.1)
LYMPHOCYTES NFR BLD AUTO: 37.7 % (ref 19.6–45.3)
MCH RBC QN AUTO: 25.4 PG (ref 26.6–33)
MCHC RBC AUTO-ENTMCNC: 30 G/DL (ref 31.5–35.7)
MCV RBC AUTO: 84.7 FL (ref 79–97)
MONOCYTES # BLD AUTO: 0.66 10*3/MM3 (ref 0.1–0.9)
MONOCYTES NFR BLD AUTO: 12.8 % (ref 5–12)
NEUTROPHILS NFR BLD AUTO: 2.4 10*3/MM3 (ref 1.7–7)
NEUTROPHILS NFR BLD AUTO: 46.7 % (ref 42.7–76)
NRBC BLD AUTO-RTO: 0 /100 WBC (ref 0–0.2)
NT-PROBNP SERPL-MCNC: 97 PG/ML (ref 0–900)
PLATELET # BLD AUTO: 290 10*3/MM3 (ref 140–450)
PMV BLD AUTO: 10.1 FL (ref 6–12)
POTASSIUM SERPL-SCNC: 4.1 MMOL/L (ref 3.5–5.2)
PROT SERPL-MCNC: 7.6 G/DL (ref 6–8.5)
QT INTERVAL: 381 MS
QTC INTERVAL: 441 MS
RBC # BLD AUTO: 5.31 10*6/MM3 (ref 3.77–5.28)
SODIUM SERPL-SCNC: 138 MMOL/L (ref 136–145)
TROPONIN T SERPL HS-MCNC: <6 NG/L
WBC NRBC COR # BLD: 5.14 10*3/MM3 (ref 3.4–10.8)
WHOLE BLOOD HOLD COAG: NORMAL
WHOLE BLOOD HOLD SPECIMEN: NORMAL

## 2023-10-22 PROCEDURE — 63710000001 PREDNISONE PER 5 MG: Performed by: EMERGENCY MEDICINE

## 2023-10-22 PROCEDURE — 36415 COLL VENOUS BLD VENIPUNCTURE: CPT

## 2023-10-22 PROCEDURE — 93005 ELECTROCARDIOGRAM TRACING: CPT | Performed by: EMERGENCY MEDICINE

## 2023-10-22 PROCEDURE — 80053 COMPREHEN METABOLIC PANEL: CPT

## 2023-10-22 PROCEDURE — 99284 EMERGENCY DEPT VISIT MOD MDM: CPT

## 2023-10-22 PROCEDURE — 94761 N-INVAS EAR/PLS OXIMETRY MLT: CPT

## 2023-10-22 PROCEDURE — 94640 AIRWAY INHALATION TREATMENT: CPT

## 2023-10-22 PROCEDURE — 83880 ASSAY OF NATRIURETIC PEPTIDE: CPT

## 2023-10-22 PROCEDURE — 84484 ASSAY OF TROPONIN QUANT: CPT

## 2023-10-22 PROCEDURE — 93005 ELECTROCARDIOGRAM TRACING: CPT

## 2023-10-22 PROCEDURE — 71045 X-RAY EXAM CHEST 1 VIEW: CPT

## 2023-10-22 PROCEDURE — 85025 COMPLETE CBC W/AUTO DIFF WBC: CPT

## 2023-10-22 RX ORDER — PREDNISONE 20 MG/1
40 TABLET ORAL DAILY
Qty: 10 TABLET | Refills: 0 | Status: SHIPPED | OUTPATIENT
Start: 2023-10-22 | End: 2023-10-27

## 2023-10-22 RX ORDER — SODIUM CHLORIDE 0.9 % (FLUSH) 0.9 %
10 SYRINGE (ML) INJECTION AS NEEDED
Status: DISCONTINUED | OUTPATIENT
Start: 2023-10-22 | End: 2023-10-22 | Stop reason: HOSPADM

## 2023-10-22 RX ORDER — BROMPHENIRAMINE MALEATE, PSEUDOEPHEDRINE HYDROCHLORIDE, AND DEXTROMETHORPHAN HYDROBROMIDE 2; 30; 10 MG/5ML; MG/5ML; MG/5ML
10 SYRUP ORAL 4 TIMES DAILY PRN
Qty: 160 ML | Refills: 0 | Status: SHIPPED | OUTPATIENT
Start: 2023-10-22

## 2023-10-22 RX ORDER — HYDROCODONE POLISTIREX AND CHLORPHENIRAMINE POLISTIREX 10; 8 MG/5ML; MG/5ML
5 SUSPENSION, EXTENDED RELEASE ORAL ONCE
Status: COMPLETED | OUTPATIENT
Start: 2023-10-22 | End: 2023-10-22

## 2023-10-22 RX ORDER — IPRATROPIUM BROMIDE AND ALBUTEROL SULFATE 2.5; .5 MG/3ML; MG/3ML
3 SOLUTION RESPIRATORY (INHALATION) ONCE
Status: COMPLETED | OUTPATIENT
Start: 2023-10-22 | End: 2023-10-22

## 2023-10-22 RX ADMIN — IPRATROPIUM BROMIDE AND ALBUTEROL SULFATE 3 ML: .5; 3 SOLUTION RESPIRATORY (INHALATION) at 12:06

## 2023-10-22 RX ADMIN — PREDNISONE 60 MG: 50 TABLET ORAL at 11:59

## 2023-10-22 RX ADMIN — HYDROCODONE POLISTIREX AND CHLORPHENIRAMINE POLISTIREX 5 ML: 10; 8 SUSPENSION, EXTENDED RELEASE ORAL at 12:00

## 2023-10-22 NOTE — ED PROVIDER NOTES
Time: 3:05 PM EDT  Date of encounter:  10/22/2023  Independent Historian/Clinical History and Information was obtained by:   Patient    History is limited by: N/A    Chief Complaint: COVID-19, cough and congestion short of air      History of Present Illness:  Patient is a 63 y.o. year old female with history of allergies and possible reactive airway disease or asthma who presents to the emergency department for evaluation of worsening shortness of breath cough and congestion headaches body aches and essentially feeling poorly after she tested positive at home twice for COVID-19.    She is been sick for the past couple of days.    She is oxygenating well at 96% on room air but feels short of breath walking around.  She is coughing frequently but no actual wheezing.    No diarrhea.    HPI    Patient Care Team  Primary Care Provider: Kecia Lu APRN    Past Medical History:     Allergies   Allergen Reactions    Other Diarrhea and Nausea And Vomiting     Dairy     Past Medical History:   Diagnosis Date    Allergic 3/2000    took shots for six years    Allergic rhinitis     Allergies     Asthma     Chronic allergic rhinitis     Diverticulosis August 2020    Emotional depression 02/26/2019    High cholesterol     Kidney calculus     Kidney disease     Kidney stones     Medication management 02/26/2019    Renal cyst 07/2020    seen on CT scan 07/2020    Urinary tract infection 04/29/2014    Vaginal bleeding      Past Surgical History:   Procedure Laterality Date    COLONOSCOPY      GUM SURGERY      gum graft    KIDNEY STONE SURGERY      unspecified    TUBAL ABDOMINAL LIGATION  1986    WISDOM TOOTH EXTRACTION  2001     Family History   Problem Relation Age of Onset    Lung disease Mother     Asthma Mother     Heart disease Father     Heart attack Father 68    Diabetes Sister     Cancer Sister         precancer    Heart disease Brother         Brother with Mitral Valve Replacement    Cancer Brother         passed away     Rectal cancer Other     Colon cancer Other         60s    Cancer Sister         precancer    COPD Sister         passed away    Diabetes Sister         passed away    Heart disease Sister        Home Medications:  Prior to Admission medications    Medication Sig Start Date End Date Taking? Authorizing Provider   albuterol (PROVENTIL) (2.5 MG/3ML) 0.083% nebulizer solution Take 2.5 mg by nebulization 4 (Four) Times a Day. 9/29/23   Noble Ayala MD   albuterol sulfate  (90 Base) MCG/ACT inhaler Inhale 2 puffs Every 4 (Four) Hours As Needed for Wheezing. 3/23/23   Fran Renner III, MD   Auvi-Q 0.3 MG/0.3ML solution auto-injector injection  9/29/22   Noble Ayala MD   azelastine (ASTELIN) 0.1 % nasal spray 2 sprays into the nostril(s) as directed by provider 2 (Two) Times a Day. Use in each nostril as directed 3/23/23   Fran Renner III, MD   brompheniramine-pseudoephedrine-DM 30-2-10 MG/5ML syrup Take 10 mL by mouth 4 (Four) Times a Day As Needed for Congestion or Cough. 10/22/23   Naveed Krause MD   budesonide-formoterol (Symbicort) 160-4.5 MCG/ACT inhaler Inhale 2 puffs 2 (Two) Times a Day. 3/23/23   Fran Renner III, MD   cetirizine (zyrTEC) 10 MG tablet Take 2 tablets by mouth Daily.    ProviderNoble MD   Chlorcyclizine-Pseudoephed (Stahist AD) 25-60 MG tablet Take 1 tablet by mouth 2 (Two) Times a Day. 3/6/23   Fran Renner III, MD   Chlorcyclizine-Pseudoephed (Stahist AD) 25-60 MG tablet Every 8 (Eight) Hours.    ProviderNoble MD   fluticasone (FLONASE) 50 MCG/ACT nasal spray USE 2 SPRAY(S) IN EACH NOSTRIL ONCE DAILY AS NEEDED FOR RHINITIS OR ALLERGIES 7/11/23   Fran Renner III, MD   Fluticasone-Umeclidin-Vilant (Trelegy Ellipta) 200-62.5-25 MCG/ACT aerosol powder  Inhale 1 puff Daily.    Provider, Noble, MD   guaiFENesin (MUCINEX) 600 MG 12 hr tablet Take 2 tablets by mouth 2 (Two) Times a Day.    Provider, MD Noble   methylcellulose, Laxative,  "(CITRUCEL) 500 MG tablet tablet Take 2 tablets by mouth Every 4 (Four) Hours As Needed.    Provider, MD Noble   montelukast (Singulair) 10 MG tablet Take 1 tablet by mouth Every Night. 3/23/23   Fran Renner III, MD   predniSONE (DELTASONE) 20 MG tablet Take 2 tablets by mouth Daily for 5 days. 10/22/23 10/27/23  Naveed Krause MD   simvastatin (ZOCOR) 40 MG tablet Take 1 tablet by mouth Every Night. 3/23/23   Fran Renner III, MD   sodium chloride 0.65 % nasal spray 1 spray into the nostril(s) as directed by provider As Needed for Congestion.    Provider, MD Noble   Trintellix 5 MG tablet tablet Take 1 tablet by mouth once daily with breakfast 9/19/23   Kecia Lu APRN   vitamin D (ERGOCALCIFEROL) 1.25 MG (61406 UT) capsule capsule Take 1 capsule by mouth 1 (One) Time Per Week. 8/24/23   Kecia Lu APRN   methylPREDNISolone (MEDROL) 4 MG dose pack Take as directed on package instructions. 9/21/23 10/22/23  Piero Chong DO        Social History:   Social History     Tobacco Use    Smoking status: Never    Smokeless tobacco: Never   Vaping Use    Vaping Use: Never used   Substance Use Topics    Alcohol use: Never    Drug use: Never         Review of Systems:  Review of Systems   I performed a 10 point review of systems which was all negative, except for the positives found in the HPI above.  Physical Exam:  /86 (Patient Position: Sitting)   Pulse 80   Temp 97.9 °F (36.6 °C) (Oral)   Resp 20   Ht 162.6 cm (64\")   Wt 96.6 kg (212 lb 15.4 oz)   SpO2 96%   BMI 36.56 kg/m²     Physical Exam   General: Awake alert and in mild distress with cough and congestion and frequent coughing episodes    HEENT: Head normocephalic atraumatic, eyes PERRLA EOMI, nose normal, oropharynx normal.    Neck: Supple full range of motion, no meningismus, no lymphadenopathy    Heart: Regular rate and rhythm, no murmurs or rubs, 2+ radial pulses bilaterally    Lungs: Clear to auscultation bilaterally without " wheezes or crackles, no respiratory distress    Abdomen: Soft, nontender, nondistended, no rebound or guarding    Skin: Warm, dry, no rash    Musculoskeletal: Normal range of motion, no lower extremity edema    Neurologic: Oriented x3, no motor deficits no sensory deficits    Psychiatric: Mood appears stable, no psychosis          Procedures:  Procedures      Medical Decision Making:      Comorbidities that affect care:    Asthma    External Notes reviewed:    None      The following orders were placed and all results were independently analyzed by me:  Orders Placed This Encounter   Procedures    XR Chest 1 View    Castle Draw    Comprehensive Metabolic Panel    BNP    Single High Sensitivity Troponin T    CBC Auto Differential    Undress & Gown    Continuous Pulse Oximetry    Vital Signs    ECG 12 Lead ED Triage Standing Order; SOA    CBC & Differential    Green Top (Gel)    Lavender Top    Gold Top - SST    Light Blue Top       Medications Given in the Emergency Department:  Medications   ipratropium-albuterol (DUO-NEB) nebulizer solution 3 mL (3 mL Nebulization Given 10/22/23 1206)   predniSONE (DELTASONE) tablet 60 mg (60 mg Oral Given 10/22/23 1159)   Hydrocod Beto-Chlorphe Beto ER (TUSSIONEX PENNKINETIC) 10-8 MG/5ML ER suspension 5 mL (5 mL Oral Given 10/22/23 1200)        ED Course:    ED Course as of 10/22/23 1859   Sun Oct 22, 2023   1857 EKG: I interpreted her twelve-lead EKG is normal sinus rhythm at 80 bpm, normal P waves, normal QRS, normal ST segments and T waves; no acute ischemia or ectopy. [VS]      ED Course User Index  [VS] Naveed Krause MD       Labs:    Lab Results (last 24 hours)       Procedure Component Value Units Date/Time    CBC & Differential [[id number]]  (Abnormal) Collected: 10/22/23 1026    Specimen: Blood Updated: 10/22/23 1034    Narrative:      The following orders were created for panel order CBC & Differential.  Procedure                               Abnormality          Status                     ---------                               -----------         ------                     CBC Auto Differential[[id number]]        Abnormal            Final result                 Please view results for these tests on the individual orders.    Comprehensive Metabolic Panel [[id number]]  (Abnormal) Collected: 10/22/23 1026    Specimen: Blood Updated: 10/22/23 1057     Glucose 131 mg/dL      BUN 13 mg/dL      Creatinine 0.84 mg/dL      Sodium 138 mmol/L      Potassium 4.1 mmol/L      Chloride 102 mmol/L      CO2 23.7 mmol/L      Calcium 9.5 mg/dL      Total Protein 7.6 g/dL      Albumin 4.2 g/dL      ALT (SGPT) 26 U/L      AST (SGOT) 23 U/L      Alkaline Phosphatase 122 U/L      Total Bilirubin 0.2 mg/dL      Globulin 3.4 gm/dL      A/G Ratio 1.2 g/dL      BUN/Creatinine Ratio 15.5     Anion Gap 12.3 mmol/L      eGFR 78.7 mL/min/1.73     Narrative:      GFR Normal >60  Chronic Kidney Disease <60  Kidney Failure <15      BNP [[id number]]  (Normal) Collected: 10/22/23 1026    Specimen: Blood Updated: 10/22/23 1052     proBNP 97.0 pg/mL     Narrative:      This assay is used as an aid in the diagnosis of individuals suspected of having heart failure. It can be used as an aid in the diagnosis of acute decompensated heart failure (ADHF) in patients presenting with signs and symptoms of ADHF to the emergency department (ED). In addition, NT-proBNP of <300 pg/mL indicates ADHF is not likely.    Age Range Result Interpretation  NT-proBNP Concentration (pg/mL:      <50             Positive            >450                   Gray                 300-450                    Negative             <300    50-75           Positive            >900                  Gray                300-900                  Negative            <300      >75             Positive            >1800                  Gray                300-1800                  Negative            <300    Single High Sensitivity Troponin T [[id number]]   (Normal) Collected: 10/22/23 1026    Specimen: Blood Updated: 10/22/23 1057     HS Troponin T <6 ng/L     Narrative:      High Sensitive Troponin T Reference Range:  <10.0 ng/L- Negative Female for AMI  <15.0 ng/L- Negative Male for AMI  >=10 - Abnormal Female indicating possible myocardial injury.  >=15 - Abnormal Male indicating possible myocardial injury.   Clinicians would have to utilize clinical acumen, EKG, Troponin, and serial changes to determine if it is an Acute Myocardial Infarction or myocardial injury due to an underlying chronic condition.         CBC Auto Differential [[id number]]  (Abnormal) Collected: 10/22/23 1026    Specimen: Blood Updated: 10/22/23 1034     WBC 5.14 10*3/mm3      RBC 5.31 10*6/mm3      Hemoglobin 13.5 g/dL      Hematocrit 45.0 %      MCV 84.7 fL      MCH 25.4 pg      MCHC 30.0 g/dL      RDW 14.6 %      RDW-SD 44.7 fl      MPV 10.1 fL      Platelets 290 10*3/mm3      Neutrophil % 46.7 %      Lymphocyte % 37.7 %      Monocyte % 12.8 %      Eosinophil % 1.8 %      Basophil % 0.6 %      Immature Grans % 0.4 %      Neutrophils, Absolute 2.40 10*3/mm3      Lymphocytes, Absolute 1.94 10*3/mm3      Monocytes, Absolute 0.66 10*3/mm3      Eosinophils, Absolute 0.09 10*3/mm3      Basophils, Absolute 0.03 10*3/mm3      Immature Grans, Absolute 0.02 10*3/mm3      nRBC 0.0 /100 WBC              Imaging:    XR Chest 1 View    Result Date: 10/22/2023  PROCEDURE: XR CHEST 1 VW  COMPARISON: [facility], , XR CHEST 1 VW, 9/21/2023, 10:33.  INDICATIONS: SHORTNESS OF BREATH PROGRESSIVELY WORSENING THE PAST FEW DAYS.  FINDINGS:  The lungs clear except for subtle interstitial density in the right lung base. Cardiac, hilar, and mediastinal silhouettes are unremarkable. No pneumothorax or pleural effusion. Bony structures are intact.  The trachea is midline.           Subtle interstitial density in the right lung base favored to represent atelectasis, involving infection is not excluded.   The remainder the lungs are clear.       LENIN IZQUIERDO DO       Electronically Signed and Approved By: LENIN IZQUIERDO DO on 10/22/2023 at 11:47                Differential Diagnosis and Discussion:    Cough: Differential diagnosis includes but is not limited to pneumonia, acute bronchitis, upper respiratory infection, ACE inhibitor use, allergic reaction, epiglottitis, seasonal allergies, chemical irritants, exercise-induced asthma, viral syndrome.  Dyspnea: Differential diagnosis includes but is not limited to metabolic acidosis, neurological disorders, psychogenic, asthma, pneumothorax, upper airway obstruction, COPD, pneumonia, noncardiogenic pulmonary edema, interstitial lung disease, anemia, congestive heart failure, and pulmonary embolism    All labs were reviewed and interpreted by me.  All X-rays impressions were independently interpreted by me.  EKG was interpreted by me.    MDM     Amount and/or Complexity of Data Reviewed  Clinical lab tests: reviewed  Tests in the radiology section of CPT®: reviewed  Tests in the medicine section of CPT®: reviewed         This patient is a 63-year-old female with history of mild intermittent asthma who now presents with COVID-19 infection over the past couple of days.    Mostly she feels congested and coughing and somewhat short of breath.    She is oxygenating well at 96% on room air and no respiratory distress is seen and no wheezing lung fields are actually clear and chest x-ray shows no obvious lobar infiltrate.    I did give her some DuoNeb breathing treatment here and cough syrup and started on prednisone given her underlying asthma history.    I do not think she warrants being started on Paxlovid and I did give her risk versus benefits and side effects of this medication and she declined.    She looks stable to be managed as an outpatient with supportive care instructions for COVID-19 viral illness and I will call in a few days of prednisone and cough syrup and  have her use her albuterol inhaler as needed.            Patient Care Considerations:          Consultants/Shared Management Plan:        Social Determinants of Health:    Patient is independent, reliable, and has access to care.       Disposition and Care Coordination:    Discharged: I considered escalation of care by admitting this patient for observation, however the patient has improved and is suitable and  stable for discharge.    I have explained the patient´s condition, diagnoses and treatment plan based on the information available to me at this time. I have answered questions and addressed any concerns. The patient has a good  understanding of the patient´s diagnosis, condition, and treatment plan as can be expected at this point. The vital signs have been stable. The patient´s condition is stable and appropriate for discharge from the emergency department.      The patient will pursue further outpatient evaluation with the primary care physician or other designated or consulting physician as outlined in the discharge instructions. They are agreeable to this plan of care and follow-up instructions have been explained in detail. The patient has received these instructions in written format and have expressed an understanding of the discharge instructions. The patient is aware that any significant change in condition or worsening of symptoms should prompt an immediate return to this or the closest emergency department or call to 911.  I have explained discharge medications and the need for follow up with the patient/caretakers. This was also printed in the discharge instructions. Patient was discharged with the following medications and follow up:      Medication List        New Prescriptions      brompheniramine-pseudoephedrine-DM 30-2-10 MG/5ML syrup  Take 10 mL by mouth 4 (Four) Times a Day As Needed for Congestion or Cough.     predniSONE 20 MG tablet  Commonly known as: DELTASONE  Take 2 tablets by mouth  Daily for 5 days.               Where to Get Your Medications        These medications were sent to [facility] - [place], [place] - [address] - [phone]  - [phone] FX  [address] [place] [address]      Phone: [phone]   brompheniramine-pseudoephedrine-DM 30-2-10 MG/5ML syrup  predniSONE 20 MG tablet      Kecia Lu, APRN  [address]  [facility] [address]  [place] [address] [phone]    Call in 3 days  As needed, If symptoms worsen, for a follow-up appointment       Final diagnoses:   COVID-19 virus infection   Bronchitis, acute, with bronchospasm        ED Disposition       ED Disposition   Discharge    Condition   Stable    Comment   --               This medical record created using voice recognition software.             Naveed Krause MD  10/22/23 5114

## 2023-10-22 NOTE — Clinical Note
[facility] EMERGENCY ROOM  [address]  [place] [address]  Phone: [phone]    Marilee De La Rosa was seen and treated in our emergency department on 10/22/2023.  She may return to work on 10/25/2023.         Thank you for choosing [facility].    Naveed Krause MD

## 2023-10-22 NOTE — DISCHARGE INSTRUCTIONS
It sounds like you have COVID-19 viral respiratory infection causing some cough and bronchitis symptoms.    You can use the cough medicine with decongestant as needed for symptom relief and try to get some rest and quarantine at home for total of 5 days to prevent spread of the virus to others.    Drink extra fluids for hydration and you can use your albuterol rescue inhaler as needed for any shortness of breath or wheezing, and also use the prednisone once a day to help with your breathing.    Your vital signs and oxygen levels looked okay today and no signs of bacterial pneumonia or anything requiring antibiotics or hospital admission was seen today.

## 2023-10-31 NOTE — PRE-PROCEDURE INSTRUCTIONS
IMPORTANT INSTRUCTIONS - PRE-ADMISSION TESTING  DO NOT EAT OR CHEW anything after midnight the night before your procedure.    You may have CLEAR liquids up to 2 hours prior to ARRIVAL time.   Take the following medications the morning of your procedure with JUST A SIP OF WATER: AZELASTINE, NEBULIZER TX,  TRINTELLIX,  CETIRIZINE, INHALERSS    DO NOT BRING your medications to the hospital with you, UNLESS something has changed since your PRE-Admission Testing appointment.  Hold all vitamins, supplements, and NSAIDS (Non- steroidal anti-inflammatory meds) for one week prior to surgery (you MAY take Tylenol or Acetaminophen).  If you are diabetic, check your blood sugar the morning of your procedure. If it is less than 70 or if you are feeling symptomatic, call the following number for further instructions: [phone]_.  Use your inhalers/nebulizers as usual, the morning of your procedure. BRING YOUR INHALERS with you.   Bring your CPAP or BIPAP to hospital, ONLY IF YOU WILL BE SPENDING THE NIGHT.   Make sure you have a ride home and have someone who will stay with you the day of your procedure after you go home.  If you have any questions, please call your Pre-Admission Testing NurseCHIVO at [phone].   Per anesthesia request, do not smoke for 24 hours before your procedure or as instructed by your surgeon.  Clear Liquid Diet        Find out when you need to start a clear liquid diet.   Think of “clear liquids” as anything you could read a newspaper through. This includes things like water, broth, sports drinks, or tea WITHOUT any kind of milk or cream.           Once you are told to start a clear liquid diet, only drink these things until 2 hours before arrival to the hospital or when the hospital says to stop. Total volume limitation: 8 oz.       Clear liquids you CAN drink:   Water   Clear broth: beef, chicken, vegetable, or bone broth with nothing in it   Gatorade   Lemonade or Robin-aid   Soda   Tea,  coffee (NO cream or honey)   Jell-O (without fruit)   Popsicles (without fruit or cream)   Italian ices   Juice without pulp: apple, white, grape   You may use salt, pepper, and sugar    Do NOT drink:   Milk or cream   Soy milk, almond milk, coconut milk, or other non-dairy drinks and   creamers   Milkshakes or smoothies   Tomato juice   Orange juice   Grapefruit juice   Cream soups or any other than broth         Clear Liquid Diet:  Do NOT eat any solid food.  Do NOT eat or suck on mints or candy.  Do NOT chew gum.  Do NOT drink thick liquids like milk or juice with pulp in it.  Do NOT add milk, cream, or anything like soy milk or almond milk to coffee or tea.

## 2023-11-02 ENCOUNTER — ANESTHESIA (OUTPATIENT)
Dept: PERIOP | Facility: HOSPITAL | Age: 63
End: 2023-11-02
Payer: COMMERCIAL

## 2023-11-02 ENCOUNTER — ANESTHESIA EVENT (OUTPATIENT)
Dept: PERIOP | Facility: HOSPITAL | Age: 63
End: 2023-11-02
Payer: COMMERCIAL

## 2023-11-02 ENCOUNTER — APPOINTMENT (OUTPATIENT)
Dept: GENERAL RADIOLOGY | Facility: HOSPITAL | Age: 63
End: 2023-11-02
Payer: COMMERCIAL

## 2023-11-02 ENCOUNTER — HOSPITAL ENCOUNTER (OUTPATIENT)
Facility: HOSPITAL | Age: 63
Setting detail: HOSPITAL OUTPATIENT SURGERY
Discharge: HOME | End: 2023-11-02
Attending: UROLOGY | Admitting: UROLOGY
Payer: COMMERCIAL

## 2023-11-02 VITALS
TEMPERATURE: 98 F | SYSTOLIC BLOOD PRESSURE: 142 MMHG | HEART RATE: 75 BPM | BODY MASS INDEX: 37.45 KG/M2 | OXYGEN SATURATION: 94 % | RESPIRATION RATE: 20 BRPM | DIASTOLIC BLOOD PRESSURE: 60 MMHG | WEIGHT: 219.36 LBS | HEIGHT: 64 IN

## 2023-11-02 DIAGNOSIS — N20.0 KIDNEY STONE: ICD-10-CM

## 2023-11-02 LAB
LAB AP CASE REPORT: NORMAL
LAB AP CLINICAL INFORMATION: NORMAL
PATH REPORT.FINAL DX SPEC: NORMAL
PATH REPORT.GROSS SPEC: NORMAL

## 2023-11-02 PROCEDURE — C1758 CATHETER, URETERAL: HCPCS | Performed by: UROLOGY

## 2023-11-02 PROCEDURE — 25010000002 LEVOFLOXACIN PER 250 MG: Performed by: UROLOGY

## 2023-11-02 PROCEDURE — C1769 GUIDE WIRE: HCPCS | Performed by: UROLOGY

## 2023-11-02 PROCEDURE — C1894 INTRO/SHEATH, NON-LASER: HCPCS | Performed by: UROLOGY

## 2023-11-02 PROCEDURE — C1747: HCPCS | Performed by: UROLOGY

## 2023-11-02 PROCEDURE — 82365 CALCULUS SPECTROSCOPY: CPT | Performed by: UROLOGY

## 2023-11-02 PROCEDURE — 76000 FLUOROSCOPY <1 HR PHYS/QHP: CPT

## 2023-11-02 PROCEDURE — 52356 CYSTO/URETERO W/LITHOTRIPSY: CPT | Performed by: UROLOGY

## 2023-11-02 PROCEDURE — 25010000002 ONDANSETRON PER 1 MG: Performed by: NURSE ANESTHETIST, CERTIFIED REGISTERED

## 2023-11-02 PROCEDURE — 25010000002 HYDROMORPHONE 1 MG/ML SOLUTION: Performed by: NURSE ANESTHETIST, CERTIFIED REGISTERED

## 2023-11-02 PROCEDURE — 25010000002 MIDAZOLAM PER 1MG: Performed by: ANESTHESIOLOGY

## 2023-11-02 PROCEDURE — 25010000002 PROPOFOL 10 MG/ML EMULSION: Performed by: NURSE ANESTHETIST, CERTIFIED REGISTERED

## 2023-11-02 PROCEDURE — 25010000002 DEXAMETHASONE PER 1 MG: Performed by: NURSE ANESTHETIST, CERTIFIED REGISTERED

## 2023-11-02 PROCEDURE — 25810000003 LACTATED RINGERS PER 1000 ML: Performed by: ANESTHESIOLOGY

## 2023-11-02 PROCEDURE — 88300 SURGICAL PATH GROSS: CPT | Performed by: UROLOGY

## 2023-11-02 PROCEDURE — C2617 STENT, NON-COR, TEM W/O DEL: HCPCS | Performed by: UROLOGY

## 2023-11-02 PROCEDURE — 25010000002 FENTANYL CITRATE (PF) 50 MCG/ML SOLUTION: Performed by: NURSE ANESTHETIST, CERTIFIED REGISTERED

## 2023-11-02 PROCEDURE — 25010000002 SUGAMMADEX 200 MG/2ML SOLUTION: Performed by: NURSE ANESTHETIST, CERTIFIED REGISTERED

## 2023-11-02 DEVICE — URETERAL STENT
Type: IMPLANTABLE DEVICE | Site: URETER | Status: FUNCTIONAL
Brand: ASCERTA™

## 2023-11-02 RX ORDER — IBUPROFEN 600 MG/1
600 TABLET ORAL EVERY 6 HOURS PRN
Status: DISCONTINUED | OUTPATIENT
Start: 2023-11-02 | End: 2023-11-02 | Stop reason: HOSPADM

## 2023-11-02 RX ORDER — ACETAMINOPHEN 325 MG/1
650 TABLET ORAL ONCE
Status: DISCONTINUED | OUTPATIENT
Start: 2023-11-02 | End: 2023-11-02 | Stop reason: HOSPADM

## 2023-11-02 RX ORDER — PROMETHAZINE HYDROCHLORIDE 12.5 MG/1
12.5 TABLET ORAL ONCE AS NEEDED
Status: DISCONTINUED | OUTPATIENT
Start: 2023-11-02 | End: 2023-11-02 | Stop reason: HOSPADM

## 2023-11-02 RX ORDER — OXYCODONE HYDROCHLORIDE AND ACETAMINOPHEN 5; 325 MG/1; MG/1
1-2 TABLET ORAL EVERY 4 HOURS PRN
Qty: 20 TABLET | Refills: 0 | Status: SHIPPED | OUTPATIENT
Start: 2023-11-02

## 2023-11-02 RX ORDER — DEXMEDETOMIDINE HYDROCHLORIDE 100 UG/ML
INJECTION, SOLUTION INTRAVENOUS AS NEEDED
Status: DISCONTINUED | OUTPATIENT
Start: 2023-11-02 | End: 2023-11-02 | Stop reason: SURG

## 2023-11-02 RX ORDER — PROMETHAZINE HYDROCHLORIDE 12.5 MG/1
25 TABLET ORAL ONCE AS NEEDED
Status: DISCONTINUED | OUTPATIENT
Start: 2023-11-02 | End: 2023-11-02 | Stop reason: HOSPADM

## 2023-11-02 RX ORDER — OXYCODONE HYDROCHLORIDE 5 MG/1
5 TABLET ORAL
Status: DISCONTINUED | OUTPATIENT
Start: 2023-11-02 | End: 2023-11-02 | Stop reason: HOSPADM

## 2023-11-02 RX ORDER — SODIUM CHLORIDE 0.9 % (FLUSH) 0.9 %
10 SYRINGE (ML) INJECTION EVERY 12 HOURS SCHEDULED
Status: DISCONTINUED | OUTPATIENT
Start: 2023-11-02 | End: 2023-11-02 | Stop reason: HOSPADM

## 2023-11-02 RX ORDER — ONDANSETRON 2 MG/ML
INJECTION INTRAMUSCULAR; INTRAVENOUS AS NEEDED
Status: DISCONTINUED | OUTPATIENT
Start: 2023-11-02 | End: 2023-11-02 | Stop reason: SURG

## 2023-11-02 RX ORDER — PROMETHAZINE HYDROCHLORIDE 25 MG/1
25 SUPPOSITORY RECTAL ONCE AS NEEDED
Status: DISCONTINUED | OUTPATIENT
Start: 2023-11-02 | End: 2023-11-02 | Stop reason: HOSPADM

## 2023-11-02 RX ORDER — SODIUM CHLORIDE, SODIUM LACTATE, POTASSIUM CHLORIDE, CALCIUM CHLORIDE 600; 310; 30; 20 MG/100ML; MG/100ML; MG/100ML; MG/100ML
9 INJECTION, SOLUTION INTRAVENOUS CONTINUOUS PRN
Status: DISCONTINUED | OUTPATIENT
Start: 2023-11-02 | End: 2023-11-02 | Stop reason: HOSPADM

## 2023-11-02 RX ORDER — ACETAMINOPHEN 500 MG
1000 TABLET ORAL ONCE
Status: COMPLETED | OUTPATIENT
Start: 2023-11-02 | End: 2023-11-02

## 2023-11-02 RX ORDER — ROCURONIUM BROMIDE 10 MG/ML
INJECTION, SOLUTION INTRAVENOUS AS NEEDED
Status: DISCONTINUED | OUTPATIENT
Start: 2023-11-02 | End: 2023-11-02 | Stop reason: SURG

## 2023-11-02 RX ORDER — LEVOFLOXACIN 5 MG/ML
500 INJECTION, SOLUTION INTRAVENOUS ONCE
Status: COMPLETED | OUTPATIENT
Start: 2023-11-02 | End: 2023-11-02

## 2023-11-02 RX ORDER — MAGNESIUM HYDROXIDE 1200 MG/15ML
LIQUID ORAL AS NEEDED
Status: DISCONTINUED | OUTPATIENT
Start: 2023-11-02 | End: 2023-11-02 | Stop reason: HOSPADM

## 2023-11-02 RX ORDER — MIDAZOLAM HYDROCHLORIDE 2 MG/2ML
2 INJECTION, SOLUTION INTRAMUSCULAR; INTRAVENOUS ONCE
Status: COMPLETED | OUTPATIENT
Start: 2023-11-02 | End: 2023-11-02

## 2023-11-02 RX ORDER — ONDANSETRON 2 MG/ML
4 INJECTION INTRAMUSCULAR; INTRAVENOUS ONCE AS NEEDED
Status: DISCONTINUED | OUTPATIENT
Start: 2023-11-02 | End: 2023-11-02 | Stop reason: HOSPADM

## 2023-11-02 RX ORDER — MEPERIDINE HYDROCHLORIDE 25 MG/ML
12.5 INJECTION INTRAMUSCULAR; INTRAVENOUS; SUBCUTANEOUS
Status: DISCONTINUED | OUTPATIENT
Start: 2023-11-02 | End: 2023-11-02 | Stop reason: HOSPADM

## 2023-11-02 RX ORDER — SODIUM CHLORIDE 9 MG/ML
100 INJECTION, SOLUTION INTRAVENOUS CONTINUOUS
Status: DISCONTINUED | OUTPATIENT
Start: 2023-11-02 | End: 2023-11-02 | Stop reason: HOSPADM

## 2023-11-02 RX ORDER — PROPOFOL 10 MG/ML
VIAL (ML) INTRAVENOUS AS NEEDED
Status: DISCONTINUED | OUTPATIENT
Start: 2023-11-02 | End: 2023-11-02 | Stop reason: SURG

## 2023-11-02 RX ORDER — SODIUM CHLORIDE 9 MG/ML
40 INJECTION, SOLUTION INTRAVENOUS AS NEEDED
Status: DISCONTINUED | OUTPATIENT
Start: 2023-11-02 | End: 2023-11-02 | Stop reason: HOSPADM

## 2023-11-02 RX ORDER — LIDOCAINE HYDROCHLORIDE 20 MG/ML
INJECTION, SOLUTION EPIDURAL; INFILTRATION; INTRACAUDAL; PERINEURAL AS NEEDED
Status: DISCONTINUED | OUTPATIENT
Start: 2023-11-02 | End: 2023-11-02 | Stop reason: SURG

## 2023-11-02 RX ORDER — SODIUM CHLORIDE 0.9 % (FLUSH) 0.9 %
10 SYRINGE (ML) INJECTION AS NEEDED
Status: DISCONTINUED | OUTPATIENT
Start: 2023-11-02 | End: 2023-11-02 | Stop reason: HOSPADM

## 2023-11-02 RX ORDER — DEXAMETHASONE SODIUM PHOSPHATE 4 MG/ML
INJECTION, SOLUTION INTRA-ARTICULAR; INTRALESIONAL; INTRAMUSCULAR; INTRAVENOUS; SOFT TISSUE AS NEEDED
Status: DISCONTINUED | OUTPATIENT
Start: 2023-11-02 | End: 2023-11-02 | Stop reason: SURG

## 2023-11-02 RX ORDER — FENTANYL CITRATE 50 UG/ML
INJECTION, SOLUTION INTRAMUSCULAR; INTRAVENOUS AS NEEDED
Status: DISCONTINUED | OUTPATIENT
Start: 2023-11-02 | End: 2023-11-02 | Stop reason: SURG

## 2023-11-02 RX ADMIN — SODIUM CHLORIDE, POTASSIUM CHLORIDE, SODIUM LACTATE AND CALCIUM CHLORIDE 9 ML/HR: 600; 310; 30; 20 INJECTION, SOLUTION INTRAVENOUS at 11:29

## 2023-11-02 RX ADMIN — ONDANSETRON 4 MG: 2 INJECTION INTRAMUSCULAR; INTRAVENOUS at 10:10

## 2023-11-02 RX ADMIN — ACETAMINOPHEN 1000 MG: 500 TABLET ORAL at 09:34

## 2023-11-02 RX ADMIN — SODIUM CHLORIDE, POTASSIUM CHLORIDE, SODIUM LACTATE AND CALCIUM CHLORIDE 9 ML/HR: 600; 310; 30; 20 INJECTION, SOLUTION INTRAVENOUS at 09:19

## 2023-11-02 RX ADMIN — PROPOFOL 200 MG: 10 INJECTION, EMULSION INTRAVENOUS at 10:00

## 2023-11-02 RX ADMIN — OXYCODONE HYDROCHLORIDE 5 MG: 5 TABLET ORAL at 11:42

## 2023-11-02 RX ADMIN — LEVOFLOXACIN 500 MG: 5 INJECTION, SOLUTION INTRAVENOUS at 09:58

## 2023-11-02 RX ADMIN — ROCURONIUM BROMIDE 50 MG: 50 INJECTION INTRAVENOUS at 10:00

## 2023-11-02 RX ADMIN — HYDROMORPHONE HYDROCHLORIDE 0.25 MG: 1 INJECTION, SOLUTION INTRAMUSCULAR; INTRAVENOUS; SUBCUTANEOUS at 11:27

## 2023-11-02 RX ADMIN — SUGAMMADEX 200 MG: 100 INJECTION, SOLUTION INTRAVENOUS at 10:46

## 2023-11-02 RX ADMIN — MIDAZOLAM HYDROCHLORIDE 2 MG: 1 INJECTION, SOLUTION INTRAMUSCULAR; INTRAVENOUS at 09:39

## 2023-11-02 RX ADMIN — DEXMEDETOMIDINE 20 MCG: 100 INJECTION, SOLUTION INTRAVENOUS at 10:13

## 2023-11-02 RX ADMIN — LIDOCAINE HYDROCHLORIDE 100 MG: 20 INJECTION, SOLUTION EPIDURAL; INFILTRATION; INTRACAUDAL; PERINEURAL at 10:00

## 2023-11-02 RX ADMIN — FENTANYL CITRATE 100 MCG: 50 INJECTION, SOLUTION INTRAMUSCULAR; INTRAVENOUS at 09:58

## 2023-11-02 RX ADMIN — DEXAMETHASONE SODIUM PHOSPHATE 8 MG: 4 INJECTION, SOLUTION INTRAMUSCULAR; INTRAVENOUS at 10:10

## 2023-11-02 NOTE — DISCHARGE INSTRUCTIONS
DISCHARGE INSTRUCTIONS  Ureteroscopy Lasertripsy      For your surgery you had:  General anesthesia (you may have a sore throat for the first 24 hours)  You have received an anesthesia medication today that can cause hormonal forms of birth control to be ineffective. You should use a different form of birth control (to prevent pregnancy) for 7 days.   You may experience dizziness, drowsiness, or lightheadedness for several hours following surgery.  Do not stay alone today or tonight.  Limit your activity for 24 hours.  You should not drive or operate machinery, drink alcohol, or sign legally binding documents for 24 hours or while you are taking pain medication.  Resume your diet slowly.  Follow any special dietary instructions you may have been given by your doctor.      NOTIFY YOUR DOCTOR IF YOU EXPERIENCE ANY OF THE FOLLOWING:  Temperature greater than 101 degrees Fahrenheit  Shaking Chills  Redness or excessive drainage from incision  Nausea, vomiting and/or pain that is not controlled by prescribed medications  Increase in bleeding or bleeding that is excessive  Unable to urinate in 6 hours after surgery  If unable to reach your doctor, please go to the closest Emergency Room  Strain urine if instructed by physician.  Collect any fragments and take with you on your scheduled appointment. You may pass stone pieces or small blood clots.  Blood in your urine is normal.  It could be light pink to cherry color.  Drink 6-8 glasses of fluid each day to assist with passing of stone fragments.  Back pain is common.  It may feel like a dull ache or back spasm.  Urine will be bloody for several days.  Slight redness or bruising may be noticed on treated side.  If you have a stent, it must be managed by your urologist.  Do NOT forget.  Medications per physician instructions as indicated on Discharge Medication Information Sheet.      SPECIAL INSTRUCTIONS:    POST-OPERATIVE FOLLOW-UP SCHEDULED FOR 2 WEEKS FOR CYSTOSCOPY  WITH STENT REMOVAL.    Last dose of pain medication was given at:  Tylenol 1000 mg at 9:34am  Oxycodone 5mg at 11:42am

## 2023-11-02 NOTE — ANESTHESIA POSTPROCEDURE EVALUATION
Patient: Marilee De La Rosa    Procedure Summary       Date: 11/02/23 Room / Location: [facility] OR 07 / [facility] MAIN OR    Anesthesia Start: 0955 Anesthesia Stop: 1059    Procedure: URETEROSCOPY LASER LITHOTRIPSY WITH STENT INSERTION (Left) Diagnosis:       Kidney stone      (Kidney stone [N20.0])    Surgeons: Griselda Mckenna MD Provider: Gee Atkins MD    Anesthesia Type: general ASA Status: 3            Anesthesia Type: general    Vitals  Vitals Value Taken Time   /51 11/02/23 1145   Temp 36 °C (96.8 °F) 11/02/23 1100   Pulse 73 11/02/23 1147   Resp 14 11/02/23 1110   SpO2 90 % 11/02/23 1147   Vitals shown include unfiled device data.        Post Anesthesia Care and Evaluation    Patient location during evaluation: bedside  Patient participation: complete - patient participated  Level of consciousness: awake  Pain management: adequate    Airway patency: patent  PONV Status: none  Cardiovascular status: acceptable and stable  Respiratory status: acceptable  Hydration status: acceptable    Comments: An Anesthesiologist personally participated in the most demanding procedures (including induction and emergence if applicable) in the anesthesia plan, monitored the course of anesthesia administration at frequent intervals and remained physically present and available for immediate diagnosis and treatment of emergencies.

## 2023-11-02 NOTE — ANESTHESIA PREPROCEDURE EVALUATION
Anesthesia Evaluation     Patient summary reviewed and Nursing notes reviewed   no history of anesthetic complications:   NPO Solid Status: > 8 hours  NPO Liquid Status: > 2 hours           Airway   Mallampati: II  TM distance: >3 FB  Neck ROM: full  No difficulty expected  Dental      Pulmonary - normal exam    breath sounds clear to auscultation  (+) asthma,  Cardiovascular - negative cardio ROS and normal exam  Exercise tolerance: good (4-7 METS)    Rhythm: regular  Rate: normal        Neuro/Psych- negative ROS  GI/Hepatic/Renal/Endo    (+) renal disease- stones    Musculoskeletal (-) negative ROS    Abdominal    Substance History - negative use     OB/GYN negative ob/gyn ROS         Other - negative ROS       ROS/Med Hx Other: >4METS, HX COVID 10/22/23, ASTHMA. DENIES SOA AT THIS TIME. STATES ECHO WAS ORDERED DUE TO SOA, FAMILY HX,  BUT NOT COMPLETED. DENIES SOA, C/P. KT               Anesthesia Plan    ASA 3     general       Anesthetic plan, risks, benefits, and alternatives have been provided, discussed and informed consent has been obtained with: patient.    CODE STATUS:

## 2023-11-02 NOTE — OP NOTE
URETEROSCOPY LASER LITHOTRIPSY WITH STENT INSERTION  Procedure Report    Patient Name:  Marilee De La Rosa  YOB: 1960    Date of Surgery:  11/2/2023     Pre-op Diagnosis:   Kidney stone [N20.0]       Post-Op Diagnosis Codes:     * Kidney stone [N20.0]      Procedure/CPT® Codes:    Procedure(s):  LEFT URETEROSCOPY, LASER LITHOTRIPSY, STONE BASKET EXTRACTION WITH LEFT URETERAL STENT INSERTION      Staff:  Surgeon(s):  Griselda Mckenna MD    Assistant: Henri Brower RN CSA    Anesthesia: General    Estimated Blood Loss: 10 mL    Implants:    Implant Name Type Inv. Item Serial No.  Lot No. LRB No. Used Action   STNT URETRL ASCERTA 6F 24CM - [id number] Stent STNT URETRL ASCERTA 6F 24CM  Eye-Fi [id number] Left 1 Implanted       Specimen:          Specimens       ID Source Type Tests Collected By Collected At Frozen?    A Ureter, Left Calculus TISSUE PATHOLOGY EXAM  STONE ANALYSIS   Griselda Mckenna MD 11/2/23 1043     Description: LEFT URETERAL STONES    This specimen was not marked as sent.                Complications: None    Description of Procedure:     After proper consent was obtained, patient was taken to operating room and placed in the dorsal lithotomy position.  The patient was prepped and draped in the normal sterile fashion for a left ureteroscopy.      A 22 Vietnamese rigid cystoscopy was passed per urethra into the bladder.  The bladder was inspected in a systemic meridian fashion.  No stones, tumors or other abnormalities were seen.      A glide wire was passed up the left ureteral orifice without difficulty.  A dual lumen catheter was placed and a second wire was placed as a safety wire.  Over the working wire a ureteral access sheath was passed.  A flexible scope was then passed into the ureter.  There was a large stone encountered in the left renal pelvis .  There were no other stones seen.     A 270 laser fiber was used to break the stone up into  several small pieces.  A stone basket was placed into the ureter and the stones were grasped and removed.      There was no evidence of injury to the ureter other than a small rent in the ureter from the access sheath.  The ureteroscope was removed.  Over the wire, a 6 Ukrainian 24 cm stent was passed.  Under fluoroscopy it was seen curling in the renal pelvis and under cystoscopy was seen curling in the bladder.  The cystoscope was removed.      A string was NOT left on the stent.      The patient tolerated the procedure well and was transferred to the PACU in stable condition.      Assistant: Henri Brower RN CSA  was responsible for performing the following activities: Suction, Irrigation, and basketing of stones  and their skilled assistance was necessary for the success of this case.      Griselda Mckenna MD     Date: 11/2/2023  Time: 10:55 EDT

## 2023-11-13 LAB
CALCIUM OXALATE DIHYDRATE MFR STONE IR: 20 %
COLOR STONE: NORMAL
COM MFR STONE: 80 %
COMPN STONE: NORMAL
LABORATORY COMMENT REPORT: NORMAL
Lab: NORMAL
Lab: NORMAL
PHOTO: NORMAL
SIZE STONE: NORMAL MM
SPEC SOURCE SUBJ: NORMAL
WT STONE: 152 MG

## 2023-11-15 ENCOUNTER — PROCEDURE VISIT (OUTPATIENT)
Dept: UROLOGY | Facility: CLINIC | Age: 63
End: 2023-11-15
Payer: COMMERCIAL

## 2023-11-15 VITALS
WEIGHT: 214.4 LBS | DIASTOLIC BLOOD PRESSURE: 83 MMHG | SYSTOLIC BLOOD PRESSURE: 170 MMHG | HEIGHT: 64 IN | BODY MASS INDEX: 36.6 KG/M2

## 2023-11-15 DIAGNOSIS — N20.0 KIDNEY STONE: Primary | ICD-10-CM

## 2023-11-15 LAB
BILIRUB BLD-MCNC: ABNORMAL MG/DL
CLARITY, POC: CLEAR
COLOR UR: ABNORMAL
EXPIRATION DATE: ABNORMAL
GLUCOSE UR STRIP-MCNC: ABNORMAL MG/DL
KETONES UR QL: ABNORMAL
LEUKOCYTE EST, POC: ABNORMAL
Lab: ABNORMAL
NITRITE UR-MCNC: NEGATIVE MG/ML
PH UR: 5.5 [PH] (ref 5–8)
PROT UR STRIP-MCNC: ABNORMAL MG/DL
RBC # UR STRIP: ABNORMAL /UL
SP GR UR: 1.03 (ref 1–1.03)
UROBILINOGEN UR QL: NORMAL

## 2023-11-15 NOTE — PROGRESS NOTES
Stent Removal    Date/Time: 11/15/2023 11:50 AM    Performed by: Griselda Mckenna MD  Authorized by: Griselda Mckenna MD  Preparation: Patient was prepped and draped in the usual sterile fashion.  Local anesthesia used: no    Anesthesia:  Local anesthesia used: no    Sedation:  Patient sedated: no    Patient tolerance: patient tolerated the procedure well with no immediate complications  Comments: After proper consent was obtained patient was placed into the dorsal lithotomy position.  Flexible cystoscope was passed into the bladder.  The leftureteral stent was grasped and removed.  The cystoscope was then removed.  Patient tolerated the procedure well.

## 2023-12-29 ENCOUNTER — TELEPHONE (OUTPATIENT)
Dept: UROLOGY | Facility: CLINIC | Age: 63
End: 2023-12-29
Payer: COMMERCIAL

## 2023-12-29 NOTE — TELEPHONE ENCOUNTER
Patient called stating she sent off her 24 hr urine a week ago and wants to see if results are back yet?

## 2024-01-10 ENCOUNTER — TELEPHONE (OUTPATIENT)
Dept: INTERNAL MEDICINE | Facility: CLINIC | Age: 64
End: 2024-01-10
Payer: COMMERCIAL

## 2024-01-22 ENCOUNTER — OFFICE VISIT (OUTPATIENT)
Dept: INTERNAL MEDICINE | Facility: CLINIC | Age: 64
End: 2024-01-22
Payer: COMMERCIAL

## 2024-01-22 VITALS
HEIGHT: 64 IN | HEART RATE: 87 BPM | TEMPERATURE: 97.8 F | DIASTOLIC BLOOD PRESSURE: 68 MMHG | OXYGEN SATURATION: 99 % | SYSTOLIC BLOOD PRESSURE: 128 MMHG | WEIGHT: 213.13 LBS | BODY MASS INDEX: 36.38 KG/M2 | RESPIRATION RATE: 20 BRPM

## 2024-01-22 DIAGNOSIS — R05.1 ACUTE COUGH: Primary | ICD-10-CM

## 2024-01-22 DIAGNOSIS — R06.02 SOB (SHORTNESS OF BREATH): ICD-10-CM

## 2024-01-22 LAB
EXPIRATION DATE: NORMAL
FLUAV AG UPPER RESP QL IA.RAPID: NOT DETECTED
FLUBV AG UPPER RESP QL IA.RAPID: NOT DETECTED
INTERNAL CONTROL: NORMAL
Lab: 9133
SARS-COV-2 AG UPPER RESP QL IA.RAPID: NOT DETECTED

## 2024-01-22 PROCEDURE — 87428 SARSCOV & INF VIR A&B AG IA: CPT | Performed by: NURSE PRACTITIONER

## 2024-01-22 PROCEDURE — 99213 OFFICE O/P EST LOW 20 MIN: CPT | Performed by: NURSE PRACTITIONER

## 2024-01-22 RX ORDER — FLUTICASONE FUROATE, UMECLIDINIUM BROMIDE AND VILANTEROL TRIFENATATE 100; 62.5; 25 UG/1; UG/1; UG/1
1 POWDER RESPIRATORY (INHALATION)
COMMUNITY

## 2024-01-22 NOTE — PROGRESS NOTES
"Chief Complaint  Follow-up (Follow up from urgent care visit on 1/10/24 for asthma, states she is still having breathing problems and that the medication she was given helped but then it came right back, she is taking her nebulizer treatments every 4 hours starting yesterday. )    Subjective          Marilee De La Rosa presents to [facility]  History of Present Illness  She was seen at  on 1/10 of asthma exacerbation. She reports completing steroids and she reports feeling better but then worsened in the last 2 days. She reports that breathing treatments helping some but having to use more in the last 2 days. Cough is now productive. She denies fever, chills  She had a negative covid test at home  Denies sick exposure.   Eating and drinking well  She reports worsening soa, cough  Sore throat and PND in the last day    Objective   Vital Signs:   /68 (BP Location: Left arm, Patient Position: Sitting, Cuff Size: Adult)   Pulse 87   Temp 97.8 °F (36.6 °C) (Temporal)   Resp 20   Ht 162.6 cm (64\")   Wt 96.7 kg (213 lb 2 oz)   SpO2 99%   BMI 36.58 kg/m²     Physical Exam  Vitals and nursing note reviewed.   Constitutional:       General: She is not in acute distress.     Appearance: Normal appearance.   HENT:      Head: Normocephalic and atraumatic.      Right Ear: Tympanic membrane, ear canal and external ear normal.      Left Ear: Tympanic membrane, ear canal and external ear normal.      Nose: Nose normal. Rhinorrhea present. No congestion.      Mouth/Throat:      Mouth: Mucous membranes are moist.      Pharynx: No posterior oropharyngeal erythema.   Eyes:      Conjunctiva/sclera: Conjunctivae normal.   Cardiovascular:      Rate and Rhythm: Normal rate and regular rhythm.      Pulses: Normal pulses.      Heart sounds: Normal heart sounds. No murmur heard.     No friction rub. No gallop.   Pulmonary:      Effort: Pulmonary effort is normal. No respiratory " distress.      Breath sounds: Normal breath sounds. No wheezing, rhonchi or rales.   Musculoskeletal:      Cervical back: Neck supple.      Right lower leg: No edema.      Left lower leg: No edema.   Lymphadenopathy:      Cervical: No cervical adenopathy.   Skin:     General: Skin is warm and dry.   Neurological:      General: No focal deficit present.      Mental Status: She is alert and oriented to person, place, and time.   Psychiatric:         Mood and Affect: Mood normal.         Behavior: Behavior normal.        Result Review :          Procedures      Assessment and Plan    Diagnoses and all orders for this visit:    1. Acute cough (Primary)  -     POCT SARS-CoV-2 Antigen KYLEE + Flu  -     XR Chest PA & Lateral (In Office)    2. SOB (shortness of breath)  -     POCT SARS-CoV-2 Antigen KYLEE + Flu  -     XR Chest PA & Lateral (In Office)    CXR negative. Covid and flu negative. Discussed likely viral illness including course and supportive care. Discussed reassuring physical exam today. Continue with nebs prn. She will follow up with asthma/allergy as scheduled. Call with any new or worsening sx or failure to improve over the next 7-10 days          Follow Up   No follow-ups on file.  Patient was given instructions and counseling regarding her condition or for health maintenance advice. Please see specific information pulled into the AVS if appropriate.

## 2024-01-29 ENCOUNTER — OFFICE VISIT (OUTPATIENT)
Dept: INTERNAL MEDICINE | Facility: CLINIC | Age: 64
End: 2024-01-29
Payer: COMMERCIAL

## 2024-01-29 VITALS
HEART RATE: 82 BPM | TEMPERATURE: 97.6 F | HEIGHT: 64 IN | WEIGHT: 214.2 LBS | DIASTOLIC BLOOD PRESSURE: 60 MMHG | BODY MASS INDEX: 36.57 KG/M2 | SYSTOLIC BLOOD PRESSURE: 130 MMHG | RESPIRATION RATE: 16 BRPM | OXYGEN SATURATION: 98 %

## 2024-01-29 DIAGNOSIS — H01.005 BLEPHARITIS OF LEFT LOWER EYELID, UNSPECIFIED TYPE: Primary | ICD-10-CM

## 2024-01-29 PROCEDURE — 99213 OFFICE O/P EST LOW 20 MIN: CPT | Performed by: NURSE PRACTITIONER

## 2024-01-29 RX ORDER — ERYTHROMYCIN 5 MG/G
OINTMENT OPHTHALMIC
Qty: 1 EACH | Refills: 0 | Status: SHIPPED | OUTPATIENT
Start: 2024-01-29 | End: 2024-02-05

## 2024-01-29 NOTE — PROGRESS NOTES
"Chief Complaint  Facial Swelling (Eye swelling) and Diarrhea    Subjective        Marilee De La Rosa presents to [facility] for facial swelling.  Patient reports that her left eye became swollen several days ago, initially thought it was a stye, has been doing warm compresses, tender to touch, red on the lower eyelid.    Objective   Vital Signs:   /60 (BP Location: Right arm, Patient Position: Sitting, Cuff Size: Adult)   Pulse 82   Temp 97.6 °F (36.4 °C) (Temporal)   Resp 16   Ht 162.6 cm (64.02\")   Wt 97.2 kg (214 lb 3.2 oz)   SpO2 98%   BMI 36.75 kg/m²     Physical Exam  Vitals and nursing note reviewed.   Constitutional:       Appearance: Normal appearance.   HENT:      Head: Normocephalic and atraumatic.      Right Ear: External ear normal.      Left Ear: External ear normal.      Nose: Nose normal.      Mouth/Throat:      Mouth: Mucous membranes are moist.   Eyes:      Pupils: Pupils are equal, round, and reactive to light.      Comments: Left lower eyelid with significant redness, swelling, tenderness to touch.   Neurological:      Mental Status: She is alert.        Result Review :  {The following data was reviewed by JOSE Plasencia on 01/29/24                Diagnoses and all orders for this visit:    1. Blepharitis of left lower eyelid, unspecified type (Primary)    Other orders  -     erythromycin (ROMYCIN) 5 MG/GM ophthalmic ointment; Administer  into the left eye Every 4 (Four) Hours While Awake for 7 days.  Dispense: 1 each; Refill: 0    Will treat with erythromycin ointment, discussed appropriate use, risk, benefits, side effects.  If not improving, would recommend follow-up, if worsening, would seek out optometry or ophthalmology.  Continue with warm compresses.  Follow-up as needed      Follow Up   No follow-ups on file.  Patient was given instructions and counseling regarding her condition or for health maintenance advice. Please see specific information " pulled into the AVS if appropriate.     Hugo Jimenez, APRN  1/29/2024  This note was electronically signed.

## 2024-02-06 ENCOUNTER — OFFICE VISIT (OUTPATIENT)
Dept: INTERNAL MEDICINE | Facility: CLINIC | Age: 64
End: 2024-02-06
Payer: COMMERCIAL

## 2024-02-06 VITALS
BODY MASS INDEX: 36.37 KG/M2 | TEMPERATURE: 98 F | DIASTOLIC BLOOD PRESSURE: 82 MMHG | SYSTOLIC BLOOD PRESSURE: 130 MMHG | HEART RATE: 74 BPM | WEIGHT: 213 LBS | OXYGEN SATURATION: 97 % | HEIGHT: 64 IN

## 2024-02-06 DIAGNOSIS — Z82.5 FAMILY HISTORY OF ASTHMA AND OTHER CHRONIC LOWER RESPIRATORY DISEASES: ICD-10-CM

## 2024-02-06 DIAGNOSIS — R06.02 SOB (SHORTNESS OF BREATH): ICD-10-CM

## 2024-02-06 DIAGNOSIS — J45.31 MILD PERSISTENT ASTHMA WITH ACUTE EXACERBATION: Primary | ICD-10-CM

## 2024-02-06 PROCEDURE — 99213 OFFICE O/P EST LOW 20 MIN: CPT | Performed by: PHYSICIAN ASSISTANT

## 2024-02-06 RX ORDER — METHYLPREDNISOLONE 4 MG/1
TABLET ORAL
Qty: 21 TABLET | Refills: 0 | Status: SHIPPED | OUTPATIENT
Start: 2024-02-06

## 2024-02-06 NOTE — ASSESSMENT & PLAN NOTE
Acute on chronic and seems to have worsened over the past few months.  Will treat acutely with a repeat of medrol dose mohan and send patient to Pulmonology for further evaluation and treatment.  If symptoms persist or worsen patient needs to return.

## 2024-02-06 NOTE — PROGRESS NOTES
"Chief Complaint  Asthma    Subjective          Marilee De La Rosa presents to [facility]    Shortness of breath- symptoms started yesterday.  Patient has a history of asthma which is usually trigger by allergies. She does admit to going on a walk yesterday outside which seemed to make her symptoms worse. She is managed by [facility] at this time.  Has never seen Pulmonology.  Patient states that her mother had a history of asthma which developed into severe COPD.  Patient denies nasal congestion, fever or body aches.  She has been using her inhalers as prescribed and taking all her allergy medicine.  Reviewed CXR from 1/22/24 which was normal at that time.    Objective   Vital Signs:   /82   Pulse 74   Temp 98 °F (36.7 °C)   Ht 162.6 cm (64\")   Wt 96.6 kg (213 lb)   SpO2 97%   BMI 36.56 kg/m²     Physical Exam  Vitals reviewed.   Constitutional:       Appearance: Normal appearance. She is well-developed.   HENT:      Head: Normocephalic and atraumatic.      Right Ear: Tympanic membrane, ear canal and external ear normal.      Left Ear: Tympanic membrane, ear canal and external ear normal.      Nose: Nose normal.      Mouth/Throat:      Mouth: Mucous membranes are moist.      Pharynx: No posterior oropharyngeal erythema.   Eyes:      Conjunctiva/sclera: Conjunctivae normal.      Pupils: Pupils are equal, round, and reactive to light.   Cardiovascular:      Rate and Rhythm: Normal rate and regular rhythm.      Heart sounds: No murmur heard.     No friction rub. No gallop.   Pulmonary:      Effort: Pulmonary effort is normal. No respiratory distress.      Breath sounds: Normal breath sounds. No wheezing or rhonchi.   Skin:     General: Skin is warm and dry.   Neurological:      Mental Status: She is alert and oriented to person, place, and time.      Cranial Nerves: No cranial nerve deficit.   Psychiatric:         Mood and Affect: Mood and " affect normal.         Behavior: Behavior normal.         Thought Content: Thought content normal.         Judgment: Judgment normal.        Result Review :          Procedures      Assessment and Plan    Diagnoses and all orders for this visit:    1. Mild persistent asthma with acute exacerbation (Primary)  Assessment & Plan:  Acute on chronic and seems to have worsened over the past few months.  Will treat acutely with a repeat of medrol dose mohan and send patient to Pulmonology for further evaluation and treatment.  If symptoms persist or worsen patient needs to return.         Orders:  -     Ambulatory Referral to Pulmonology    2. SOB (shortness of breath)  -     Ambulatory Referral to Pulmonology    3. Family history of asthma and other chronic lower respiratory diseases  -     Ambulatory Referral to Pulmonology    Other orders  -     methylPREDNISolone (MEDROL) 4 MG dose pack; Take as directed on package instructions.  Dispense: 21 tablet; Refill: 0              Follow Up   No follow-ups on file.  Patient was given instructions and counseling regarding her condition or for health maintenance advice. Please see specific information pulled into the AVS if appropriate.

## 2024-02-16 ENCOUNTER — HOSPITAL ENCOUNTER (OUTPATIENT)
Dept: GENERAL RADIOLOGY | Facility: HOSPITAL | Age: 64
Discharge: HOME | End: 2024-02-16
Admitting: ALLERGY & IMMUNOLOGY
Payer: COMMERCIAL

## 2024-02-16 ENCOUNTER — TRANSCRIBE ORDERS (OUTPATIENT)
Dept: GENERAL RADIOLOGY | Facility: HOSPITAL | Age: 64
End: 2024-02-16
Payer: COMMERCIAL

## 2024-02-16 DIAGNOSIS — J45.51 SEVERE PERSISTENT ASTHMA WITH (ACUTE) EXACERBATION: Primary | ICD-10-CM

## 2024-02-16 DIAGNOSIS — J30.1 ALLERGIC RHINITIS DUE TO POLLEN, UNSPECIFIED SEASONALITY: ICD-10-CM

## 2024-02-16 DIAGNOSIS — J45.51 SEVERE PERSISTENT ASTHMA WITH (ACUTE) EXACERBATION: ICD-10-CM

## 2024-02-16 DIAGNOSIS — R94.2 ABNORMAL RESULTS OF PULMONARY FUNCTION STUDIES: ICD-10-CM

## 2024-02-16 DIAGNOSIS — J30.89 OTHER ALLERGIC RHINITIS: ICD-10-CM

## 2024-02-16 PROCEDURE — 71046 X-RAY EXAM CHEST 2 VIEWS: CPT

## 2024-02-23 ENCOUNTER — TELEPHONE (OUTPATIENT)
Dept: INTERNAL MEDICINE | Facility: CLINIC | Age: 64
End: 2024-02-23
Payer: COMMERCIAL

## 2024-02-23 DIAGNOSIS — D64.9 ANEMIA, UNSPECIFIED TYPE: ICD-10-CM

## 2024-02-23 DIAGNOSIS — E78.2 MIXED HYPERLIPIDEMIA: Primary | ICD-10-CM

## 2024-02-23 DIAGNOSIS — Z11.59 NEED FOR HEPATITIS C SCREENING TEST: ICD-10-CM

## 2024-02-23 NOTE — TELEPHONE ENCOUNTER
Caller: Marilee De La Rosa    Relationship: Self    Best call back number:     [phone]       What orders are you requesting (i.e. lab or imaging): LABS    In what timeframe would the patient need to come in: BEFORE 3/25/24    Where will you receive your lab/imaging services:     Additional notes: PLEASE CALL AND ADVISE IF SHE NEEDS LABS AND PAP OR NOT

## 2024-02-27 ENCOUNTER — OFFICE VISIT (OUTPATIENT)
Dept: INTERNAL MEDICINE | Facility: CLINIC | Age: 64
End: 2024-02-27
Payer: COMMERCIAL

## 2024-02-27 VITALS
TEMPERATURE: 97.1 F | OXYGEN SATURATION: 98 % | WEIGHT: 214.8 LBS | DIASTOLIC BLOOD PRESSURE: 78 MMHG | BODY MASS INDEX: 36.87 KG/M2 | HEART RATE: 79 BPM | SYSTOLIC BLOOD PRESSURE: 130 MMHG

## 2024-02-27 DIAGNOSIS — R06.02 SOB (SHORTNESS OF BREATH): Primary | ICD-10-CM

## 2024-02-27 LAB — ALPHA1 GLOB MFR UR ELPH: 142 MG/DL (ref 90–200)

## 2024-02-27 PROCEDURE — 82103 ALPHA-1-ANTITRYPSIN TOTAL: CPT | Performed by: PHYSICIAN ASSISTANT

## 2024-02-27 RX ORDER — PREDNISONE 20 MG/1
TABLET ORAL
Qty: 7 TABLET | Refills: 0 | Status: SHIPPED | OUTPATIENT
Start: 2024-02-27

## 2024-02-27 RX ORDER — FLUTICASONE FUROATE, UMECLIDINIUM BROMIDE AND VILANTEROL TRIFENATATE 200; 62.5; 25 UG/1; UG/1; UG/1
POWDER RESPIRATORY (INHALATION)
COMMUNITY

## 2024-02-27 NOTE — PROGRESS NOTES
Chief Complaint  Asthma (Flare up)    Subjective          Marilee De La Rosa presents to [facility]    Asthma- patient noticed yesterday worsening shortness of breath after she had been shopping for a while.  Patient states that she sat down and rested for about 30 minutes but her symptoms did not improve until she used her rescue inhaler.  Patient has seen her allergist since her last appointment with me, earlier this month.  At that time she had been on a medrol mohan then medrol mohan was repeated about a week later with a CXR which was normal.  Patient states that her CXR was normal and the steroid typically resolves her symptoms but they seem to come back quickly.  No leg swelling, no chest pain.     Objective   Vital Signs:   /78 (BP Location: Left arm, Patient Position: Sitting, Cuff Size: Large Adult)   Pulse 79   Temp 97.1 °F (36.2 °C) (Temporal)   Wt 97.4 kg (214 lb 12.8 oz)   SpO2 98%   BMI 36.87 kg/m²     Physical Exam  Vitals reviewed.   Constitutional:       Appearance: Normal appearance. She is well-developed.   HENT:      Head: Normocephalic and atraumatic.      Right Ear: Tympanic membrane, ear canal and external ear normal.      Left Ear: Tympanic membrane, ear canal and external ear normal.      Nose: Nose normal.      Mouth/Throat:      Mouth: Mucous membranes are moist.      Pharynx: No posterior oropharyngeal erythema.   Eyes:      Conjunctiva/sclera: Conjunctivae normal.      Pupils: Pupils are equal, round, and reactive to light.   Cardiovascular:      Rate and Rhythm: Normal rate and regular rhythm.      Heart sounds: No murmur heard.     No friction rub. No gallop.   Pulmonary:      Effort: Pulmonary effort is normal.      Breath sounds: Normal breath sounds. No wheezing or rhonchi.   Skin:     General: Skin is warm and dry.   Neurological:      Mental Status: She is alert and oriented to person, place, and time.      Cranial Nerves:  No cranial nerve deficit.   Psychiatric:         Mood and Affect: Mood and affect normal.         Behavior: Behavior normal.         Thought Content: Thought content normal.         Judgment: Judgment normal.        Result Review :          Procedures      Assessment and Plan    Diagnoses and all orders for this visit:    1. SOB (shortness of breath) (Primary)  Assessment & Plan:  Patient with reassuring physical exam, however, significant family history of asthma and lung disease.  Will order CT chest, PFT and alpha-1 antitrypsin.  Continue inhalers as prescribed.  Will send in a short course of oral steroids but discussed concern about recent steroid use and potential side effects of long term steroid use.  Patient to follow up with PCP in a few weeks to go over results.  If normal could consider other avenues to evaluate such as echo, etc.  Call for any questions or concerns.    Orders:  -     Alpha - 1 - Antitrypsin  -     CT Chest Without Contrast; Future  -     Complete PFT - Pre & Post Bronchodilator; Future    Other orders  -     predniSONE (DELTASONE) 20 MG tablet; Take 2 tabs (40mg) PO x 2 days, 1 tab (20mg) PO x 3 days  Dispense: 7 tablet; Refill: 0              Follow Up   No follow-ups on file.  Patient was given instructions and counseling regarding her condition or for health maintenance advice. Please see specific information pulled into the AVS if appropriate.

## 2024-02-27 NOTE — ASSESSMENT & PLAN NOTE
Patient with reassuring physical exam, however, significant family history of asthma and lung disease.  Will order CT chest, PFT and alpha-1 antitrypsin.  Continue inhalers as prescribed.  Will send in a short course of oral steroids but discussed concern about recent steroid use and potential side effects of long term steroid use.  Patient to follow up with PCP in a few weeks to go over results.  If normal could consider other avenues to evaluate such as echo, etc.  Call for any questions or concerns.

## 2024-03-12 ENCOUNTER — LAB (OUTPATIENT)
Dept: LAB | Facility: HOSPITAL | Age: 64
End: 2024-03-12
Payer: COMMERCIAL

## 2024-03-12 ENCOUNTER — HOSPITAL ENCOUNTER (OUTPATIENT)
Dept: CT IMAGING | Facility: HOSPITAL | Age: 64
Discharge: HOME | End: 2024-03-12
Payer: COMMERCIAL

## 2024-03-12 ENCOUNTER — TRANSCRIBE ORDERS (OUTPATIENT)
Dept: ADMINISTRATIVE | Facility: HOSPITAL | Age: 64
End: 2024-03-12
Payer: COMMERCIAL

## 2024-03-12 DIAGNOSIS — J31.0 OTHER RHINITIS: ICD-10-CM

## 2024-03-12 DIAGNOSIS — J45.50 SEVERE PERSISTENT ASTHMA, UNCOMPLICATED: ICD-10-CM

## 2024-03-12 DIAGNOSIS — R06.02 SOB (SHORTNESS OF BREATH): ICD-10-CM

## 2024-03-12 DIAGNOSIS — J30.1 ALLERGIC RHINITIS DUE TO POLLEN, UNSPECIFIED SEASONALITY: Primary | ICD-10-CM

## 2024-03-12 DIAGNOSIS — J30.1 ALLERGIC RHINITIS DUE TO POLLEN, UNSPECIFIED SEASONALITY: ICD-10-CM

## 2024-03-12 LAB
BASOPHILS # BLD AUTO: 0.03 10*3/MM3 (ref 0–0.2)
BASOPHILS NFR BLD AUTO: 0.4 % (ref 0–1.5)
DEPRECATED RDW RBC AUTO: 42.4 FL (ref 37–54)
EOSINOPHIL # BLD AUTO: 0.34 10*3/MM3 (ref 0–0.4)
EOSINOPHIL NFR BLD AUTO: 4.3 % (ref 0.3–6.2)
ERYTHROCYTE [DISTWIDTH] IN BLOOD BY AUTOMATED COUNT: 14 % (ref 12.3–15.4)
HCT VFR BLD AUTO: 39.7 % (ref 34–46.6)
HGB BLD-MCNC: 12.5 G/DL (ref 12–15.9)
IMM GRANULOCYTES # BLD AUTO: 0.03 10*3/MM3 (ref 0–0.05)
IMM GRANULOCYTES NFR BLD AUTO: 0.4 % (ref 0–0.5)
LYMPHOCYTES # BLD AUTO: 2.05 10*3/MM3 (ref 0.7–3.1)
LYMPHOCYTES NFR BLD AUTO: 26 % (ref 19.6–45.3)
MCH RBC QN AUTO: 26.3 PG (ref 26.6–33)
MCHC RBC AUTO-ENTMCNC: 31.5 G/DL (ref 31.5–35.7)
MCV RBC AUTO: 83.4 FL (ref 79–97)
MONOCYTES # BLD AUTO: 0.54 10*3/MM3 (ref 0.1–0.9)
MONOCYTES NFR BLD AUTO: 6.9 % (ref 5–12)
NEUTROPHILS NFR BLD AUTO: 4.88 10*3/MM3 (ref 1.7–7)
NEUTROPHILS NFR BLD AUTO: 62 % (ref 42.7–76)
NRBC BLD AUTO-RTO: 0 /100 WBC (ref 0–0.2)
PLATELET # BLD AUTO: 306 10*3/MM3 (ref 140–450)
PMV BLD AUTO: 10.5 FL (ref 6–12)
RBC # BLD AUTO: 4.76 10*6/MM3 (ref 3.77–5.28)
WBC NRBC COR # BLD AUTO: 7.87 10*3/MM3 (ref 3.4–10.8)

## 2024-03-12 PROCEDURE — 82785 ASSAY OF IGE: CPT

## 2024-03-12 PROCEDURE — 85025 COMPLETE CBC W/AUTO DIFF WBC: CPT

## 2024-03-12 PROCEDURE — 36415 COLL VENOUS BLD VENIPUNCTURE: CPT

## 2024-03-12 PROCEDURE — 71250 CT THORAX DX C-: CPT

## 2024-03-18 ENCOUNTER — CLINICAL SUPPORT (OUTPATIENT)
Dept: INTERNAL MEDICINE | Facility: CLINIC | Age: 64
End: 2024-03-18
Payer: COMMERCIAL

## 2024-03-18 DIAGNOSIS — E78.2 MIXED HYPERLIPIDEMIA: ICD-10-CM

## 2024-03-18 DIAGNOSIS — D64.9 ANEMIA, UNSPECIFIED TYPE: ICD-10-CM

## 2024-03-18 DIAGNOSIS — Z11.59 NEED FOR HEPATITIS C SCREENING TEST: ICD-10-CM

## 2024-03-18 LAB
ALBUMIN SERPL-MCNC: 4.1 G/DL (ref 3.5–5.2)
ALBUMIN/GLOB SERPL: 1.2 G/DL
ALP SERPL-CCNC: 125 U/L (ref 39–117)
ALT SERPL W P-5'-P-CCNC: 17 U/L (ref 1–33)
ANION GAP SERPL CALCULATED.3IONS-SCNC: 14.2 MMOL/L (ref 5–15)
AST SERPL-CCNC: 13 U/L (ref 1–32)
BASOPHILS # BLD AUTO: 0.03 10*3/MM3 (ref 0–0.2)
BASOPHILS NFR BLD AUTO: 0.4 % (ref 0–1.5)
BILIRUB SERPL-MCNC: 0.4 MG/DL (ref 0–1.2)
BUN SERPL-MCNC: 15 MG/DL (ref 8–23)
BUN/CREAT SERPL: 17.4 (ref 7–25)
CALCIUM SPEC-SCNC: 9.8 MG/DL (ref 8.6–10.5)
CHLORIDE SERPL-SCNC: 104 MMOL/L (ref 98–107)
CHOLEST SERPL-MCNC: 206 MG/DL (ref 0–200)
CO2 SERPL-SCNC: 22.8 MMOL/L (ref 22–29)
CREAT SERPL-MCNC: 0.86 MG/DL (ref 0.57–1)
DEPRECATED RDW RBC AUTO: 41.8 FL (ref 37–54)
EGFRCR SERPLBLD CKD-EPI 2021: 76.5 ML/MIN/1.73
EOSINOPHIL # BLD AUTO: 0.2 10*3/MM3 (ref 0–0.4)
EOSINOPHIL NFR BLD AUTO: 2.4 % (ref 0.3–6.2)
ERYTHROCYTE [DISTWIDTH] IN BLOOD BY AUTOMATED COUNT: 13.9 % (ref 12.3–15.4)
GLOBULIN UR ELPH-MCNC: 3.4 GM/DL
GLUCOSE SERPL-MCNC: 134 MG/DL (ref 65–99)
HCT VFR BLD AUTO: 41 % (ref 34–46.6)
HCV AB SER DONR QL: NORMAL
HDLC SERPL-MCNC: 41 MG/DL (ref 40–60)
HGB BLD-MCNC: 13.2 G/DL (ref 12–15.9)
IGE SERPL-ACNC: 76 IU/ML (ref 6–495)
IMM GRANULOCYTES # BLD AUTO: 0.02 10*3/MM3 (ref 0–0.05)
IMM GRANULOCYTES NFR BLD AUTO: 0.2 % (ref 0–0.5)
LDLC SERPL CALC-MCNC: 132 MG/DL (ref 0–100)
LDLC/HDLC SERPL: 3.14 {RATIO}
LYMPHOCYTES # BLD AUTO: 1.69 10*3/MM3 (ref 0.7–3.1)
LYMPHOCYTES NFR BLD AUTO: 19.9 % (ref 19.6–45.3)
MCH RBC QN AUTO: 26.6 PG (ref 26.6–33)
MCHC RBC AUTO-ENTMCNC: 32.2 G/DL (ref 31.5–35.7)
MCV RBC AUTO: 82.5 FL (ref 79–97)
MONOCYTES # BLD AUTO: 0.38 10*3/MM3 (ref 0.1–0.9)
MONOCYTES NFR BLD AUTO: 4.5 % (ref 5–12)
NEUTROPHILS NFR BLD AUTO: 6.19 10*3/MM3 (ref 1.7–7)
NEUTROPHILS NFR BLD AUTO: 72.6 % (ref 42.7–76)
NRBC BLD AUTO-RTO: 0 /100 WBC (ref 0–0.2)
PLATELET # BLD AUTO: 337 10*3/MM3 (ref 140–450)
PMV BLD AUTO: 10.8 FL (ref 6–12)
POTASSIUM SERPL-SCNC: 4 MMOL/L (ref 3.5–5.2)
PROT SERPL-MCNC: 7.5 G/DL (ref 6–8.5)
RBC # BLD AUTO: 4.97 10*6/MM3 (ref 3.77–5.28)
SODIUM SERPL-SCNC: 141 MMOL/L (ref 136–145)
TRIGL SERPL-MCNC: 182 MG/DL (ref 0–150)
VLDLC SERPL-MCNC: 33 MG/DL (ref 5–40)
WBC NRBC COR # BLD AUTO: 8.51 10*3/MM3 (ref 3.4–10.8)

## 2024-03-18 PROCEDURE — 80061 LIPID PANEL: CPT | Performed by: NURSE PRACTITIONER

## 2024-03-18 PROCEDURE — 86803 HEPATITIS C AB TEST: CPT | Performed by: NURSE PRACTITIONER

## 2024-03-18 PROCEDURE — 80053 COMPREHEN METABOLIC PANEL: CPT | Performed by: NURSE PRACTITIONER

## 2024-03-18 PROCEDURE — 85025 COMPLETE CBC W/AUTO DIFF WBC: CPT | Performed by: NURSE PRACTITIONER

## 2024-03-18 PROCEDURE — 36415 COLL VENOUS BLD VENIPUNCTURE: CPT | Performed by: NURSE PRACTITIONER

## 2024-03-18 NOTE — PROGRESS NOTES
Venipuncture Blood Specimen Collection  Venipuncture performed in [facility] by Kelly Gomez RN with good hemostasis. Patient tolerated the procedure well without complications.   03/18/24   Kelly Gomez RN

## 2024-03-25 ENCOUNTER — OFFICE VISIT (OUTPATIENT)
Dept: INTERNAL MEDICINE | Facility: CLINIC | Age: 64
End: 2024-03-25
Payer: COMMERCIAL

## 2024-03-25 VITALS
OXYGEN SATURATION: 98 % | SYSTOLIC BLOOD PRESSURE: 122 MMHG | RESPIRATION RATE: 18 BRPM | DIASTOLIC BLOOD PRESSURE: 78 MMHG | WEIGHT: 219 LBS | HEART RATE: 82 BPM | HEIGHT: 64 IN | TEMPERATURE: 97.5 F | BODY MASS INDEX: 37.39 KG/M2

## 2024-03-25 DIAGNOSIS — B37.0 THRUSH: ICD-10-CM

## 2024-03-25 DIAGNOSIS — E78.2 MIXED HYPERLIPIDEMIA: ICD-10-CM

## 2024-03-25 DIAGNOSIS — J45.30 MILD PERSISTENT ASTHMA, UNSPECIFIED WHETHER COMPLICATED: ICD-10-CM

## 2024-03-25 DIAGNOSIS — R73.03 PREDIABETES: ICD-10-CM

## 2024-03-25 DIAGNOSIS — Z00.00 ANNUAL PHYSICAL EXAM: Primary | ICD-10-CM

## 2024-03-25 LAB — HBA1C MFR BLD: 7 % (ref 4.8–5.6)

## 2024-03-25 PROCEDURE — 99396 PREV VISIT EST AGE 40-64: CPT | Performed by: NURSE PRACTITIONER

## 2024-03-25 PROCEDURE — 90750 HZV VACC RECOMBINANT IM: CPT | Performed by: NURSE PRACTITIONER

## 2024-03-25 PROCEDURE — 99214 OFFICE O/P EST MOD 30 MIN: CPT | Performed by: NURSE PRACTITIONER

## 2024-03-25 PROCEDURE — 90677 PCV20 VACCINE IM: CPT | Performed by: NURSE PRACTITIONER

## 2024-03-25 PROCEDURE — 83036 HEMOGLOBIN GLYCOSYLATED A1C: CPT | Performed by: NURSE PRACTITIONER

## 2024-03-25 RX ORDER — ALBUTEROL SULFATE AND BUDESONIDE 90; 80 UG/1; UG/1
2 AEROSOL, METERED RESPIRATORY (INHALATION) AS NEEDED
COMMUNITY
Start: 2024-03-08

## 2024-03-25 RX ORDER — SIMVASTATIN 80 MG
80 TABLET ORAL NIGHTLY
Qty: 90 TABLET | Refills: 0 | Status: SHIPPED | OUTPATIENT
Start: 2024-03-25

## 2024-03-25 RX ORDER — BUDESONIDE AND FORMOTEROL FUMARATE DIHYDRATE 160; 4.5 UG/1; UG/1
2 AEROSOL RESPIRATORY (INHALATION)
COMMUNITY

## 2024-03-25 NOTE — LETTER
[facility]  Vaccine Consent Form    Patient Name:  Marilee De La Rosa  Patient :  1960     Vaccine(s) Ordered    Pneumococcal Conjugate Vaccine 20-Valent (PCV20)  Shingrix Vaccine        Screening Checklist  The following questions should be completed prior to vaccination. If you answer “yes” to any question, it does not necessarily mean you should not be vaccinated. It just means we may need to clarify or ask more questions. If a question is unclear, please ask your healthcare provider to explain it.    Yes No   Any fever or moderate to severe illness today (mild illness and/or antibiotic treatment are not contraindications)?     Do you have a history of a serious reaction to any previous vaccinations, such as anaphylaxis, encephalopathy within 7 days, Guillain-Eureka syndrome within 6 weeks, seizure?     Have you received any live vaccine(s) (e.g MMR, APRIL) or any other vaccines in the last month (to ensure duplicate doses aren't given)?     Do you have an anaphylactic allergy to latex (DTaP, DTaP-IPV, Hep A, Hep B, MenB, RV, Td, Tdap), baker’s yeast (Hep B, HPV), polysorbates (RSV, nirsevimab, PCV 20, Rotavirrus, Tdap, Shingrix), or gelatin (APRIL, MMR)?     Do you have an anaphylactic allergy to neomycin (Rabies, APRIL, MMR, IPV, Hep A), polymyxin B (IPV), or streptomycin (IPV)?      Any cancer, leukemia, AIDS, or other immune system disorder? (APRIL, MMR, RV)     Do you have a parent, brother, or sister with an immune system problem (if immune competence of vaccine recipient clinically verified, can proceed)? (MMR, APRIL)     Any recent steroid treatments for >2 weeks, chemotherapy, or radiation treatment? (APRIL, MMR)     Have you received antibody-containing blood transfusions or IVIG in the past 11 months (recommended interval is dependent on product)? (MMR, APRIL)     Have you taken antiviral drugs (acyclovir, famciclovir, valacyclovir for APRIL) in the last 24 or 48 hours, respectively?      Are you pregnant  "or planning to become pregnant within 1 month? (APRIL, MMR, HPV, IPV, MenB, Abrexvy; For Hep B- refer to Engerix-B; For RSV - Abrysvo is indicated for 32-36 weeks of pregnancy from September to January)     For infants, have you ever been told your child has had intussusception or a medical emergency involving obstruction of the intestine (Rotavirus)? If not for an infant, can skip this question.         *Ordering Physicians/APC should be consulted if \"yes\" is checked by the patient or guardian above.  I have received, read, and understand the Vaccine Information Statement (VIS) for each vaccine ordered.  I have considered my or my child's health status as well as the health status of my close contacts.  I have taken the opportunity to discuss my vaccine questions with my or my child's health care provider.   I have requested that the ordered vaccine(s) be given to me or my child.  I understand the benefits and risks of the vaccines.  I understand that I should remain in the clinic for 15 minutes after receiving the vaccine(s).  _________________________________________________________  Signature of Patient or Parent/Legal Guardian ____________________  Date     "

## 2024-03-25 NOTE — PROGRESS NOTES
"Chief Complaint  Annual Exam (Last pap in 2021 normal ) and Thrush (3-4 days white thick feeling on tongue with soreness due to inhalers )    Subjective          Marilee De La Rosa presents to [facility]  History of Present Illness  Annual physical    Thrush -3-4 days of white thick feeling on tongue with soreness  Reports this is 2/2 inhaler use    HLD-no leg cramps  Reports not eating as well  Eating more while on prednisone    She reports recent more issues with breathing  She reports seeing Dr. Tellez, family allergy asthma  She reports still needing breathing treatment  Back on symbicort, started on airsupra  She has also been on steroid recently  Has an appt with Dr. Roberson 4/22  PFTs next week    Reviewed recent labs.  Elevated glucose.  Last A1c 6.3  Objective   Vital Signs:   /78 (BP Location: Left arm, Patient Position: Sitting, Cuff Size: Large Adult)   Pulse 82   Temp 97.5 °F (36.4 °C)   Resp 18   Ht 162.6 cm (64\")   Wt 99.3 kg (219 lb)   SpO2 98%   BMI 37.59 kg/m²     Physical Exam  Vitals and nursing note reviewed.   Constitutional:       General: She is not in acute distress.     Appearance: Normal appearance.   HENT:      Head: Normocephalic and atraumatic.      Right Ear: Tympanic membrane, ear canal and external ear normal.      Left Ear: Tympanic membrane, ear canal and external ear normal.      Nose: Nose normal.      Mouth/Throat:      Mouth: Mucous membranes are moist.      Pharynx: No posterior oropharyngeal erythema.   Eyes:      Conjunctiva/sclera: Conjunctivae normal.   Cardiovascular:      Rate and Rhythm: Normal rate and regular rhythm.      Pulses: Normal pulses.      Heart sounds: Normal heart sounds. No murmur heard.     No friction rub. No gallop.   Pulmonary:      Effort: Pulmonary effort is normal. No respiratory distress.      Breath sounds: No wheezing, rhonchi or rales.   Abdominal:      Palpations: Abdomen is soft.     "  Tenderness: There is no abdominal tenderness.   Musculoskeletal:      Cervical back: Neck supple.      Right lower leg: No edema.      Left lower leg: No edema.   Lymphadenopathy:      Cervical: No cervical adenopathy.   Skin:     General: Skin is warm and dry.   Neurological:      General: No focal deficit present.      Mental Status: She is alert and oriented to person, place, and time.   Psychiatric:         Mood and Affect: Mood normal.         Behavior: Behavior normal.        Result Review :          Procedures      Assessment and Plan    Diagnoses and all orders for this visit:    1. Annual physical exam (Primary)    2. Mild persistent asthma, unspecified whether complicated  Comments:  She will have appointment with pulmonology to discuss recent CT and recommendation of biologic by allergy    3. Mixed hyperlipidemia  Comments:  Increasing Zocor to 80 mg daily.  Reviewed risks and benefits  Orders:  -     simvastatin (ZOCOR) 80 MG tablet; Take 1 tablet by mouth Every Night.  Dispense: 90 tablet; Refill: 0    4. Prediabetes  Comments:  Checking A1c today.  Discussed starting metformin if 6.5 or higher.  Risk and benefits discussed  Orders:  -     Hemoglobin A1c    5. Thrush  Comments:  Will treat with nystatin.  Discussed rinsing following Symbicort use.    Other orders  -     nystatin (MYCOSTATIN) 100,000 unit/mL suspension; Swish and swallow 5 mL 4 (Four) Times a Day for 7 days.  Dispense: 120 mL; Refill: 0  -     Pneumococcal Conjugate Vaccine 20-Valent (PCV20)  -     Shingrix Vaccine        40 to 64: Counseling/Anticipatory Guidance Discussed: nutrition, physical activity, healthy weight, mental health, immunizations, and screenings      Follow Up   Return in about 4 weeks (around 4/22/2024).  Patient was given instructions and counseling regarding her condition or for health maintenance advice. Please see specific information pulled into the AVS if appropriate.

## 2024-03-26 RX ORDER — METFORMIN HYDROCHLORIDE 500 MG/1
500 TABLET, EXTENDED RELEASE ORAL
Qty: 90 TABLET | Refills: 0 | Status: SHIPPED | OUTPATIENT
Start: 2024-03-26

## 2024-04-02 ENCOUNTER — HOSPITAL ENCOUNTER (OUTPATIENT)
Dept: RESPIRATORY THERAPY | Facility: HOSPITAL | Age: 64
Discharge: HOME | End: 2024-04-02
Admitting: PHYSICIAN ASSISTANT
Payer: COMMERCIAL

## 2024-04-02 DIAGNOSIS — R06.02 SOB (SHORTNESS OF BREATH): ICD-10-CM

## 2024-04-02 PROCEDURE — 94729 DIFFUSING CAPACITY: CPT

## 2024-04-02 PROCEDURE — 94060 EVALUATION OF WHEEZING: CPT

## 2024-04-02 PROCEDURE — 94726 PLETHYSMOGRAPHY LUNG VOLUMES: CPT

## 2024-04-02 RX ORDER — ALBUTEROL SULFATE 2.5 MG/3ML
2.5 SOLUTION RESPIRATORY (INHALATION) ONCE
Status: COMPLETED | OUTPATIENT
Start: 2024-04-02 | End: 2024-04-02

## 2024-04-02 RX ADMIN — ALBUTEROL SULFATE 2.5 MG: 2.5 SOLUTION RESPIRATORY (INHALATION) at 10:46

## 2024-04-23 ENCOUNTER — OFFICE VISIT (OUTPATIENT)
Dept: PULMONOLOGY | Facility: CLINIC | Age: 64
End: 2024-04-23
Payer: COMMERCIAL

## 2024-04-23 VITALS
HEIGHT: 64 IN | SYSTOLIC BLOOD PRESSURE: 143 MMHG | DIASTOLIC BLOOD PRESSURE: 80 MMHG | RESPIRATION RATE: 14 BRPM | HEART RATE: 90 BPM | BODY MASS INDEX: 36.7 KG/M2 | WEIGHT: 215 LBS | OXYGEN SATURATION: 96 % | TEMPERATURE: 98 F

## 2024-04-23 DIAGNOSIS — R06.09 DYSPNEA ON EXERTION: ICD-10-CM

## 2024-04-23 DIAGNOSIS — E66.01 MORBID (SEVERE) OBESITY DUE TO EXCESS CALORIES: ICD-10-CM

## 2024-04-23 DIAGNOSIS — Z86.16 HISTORY OF COVID-19: Primary | ICD-10-CM

## 2024-04-23 PROCEDURE — 99203 OFFICE O/P NEW LOW 30 MIN: CPT | Performed by: INTERNAL MEDICINE

## 2024-04-23 NOTE — PROGRESS NOTES
Pulmonary Consultation    Chanel Marino PA-C,    Thank you for asking me to see Marilee De La Rosa for   Chief Complaint   Patient presents with    Establish Care    Shortness of Breath    Asthma    Cough   .      History of Present Illness  Marilee De La Rosa is a 63 y.o. female with a PMH significant for COVID infection in November last presents for evaluation of breathlessness on exertion patient denies any wheezing and has been on Symbicort inhaler which she feels is not helping patient also complains of tiredness and fatigue she has history of nasal allergies but no history of bronchial asthma      Tobacco use history:  Never smoker      Review of Systems: History obtained from chart review and the patient.  Review of Systems   Respiratory:  Positive for shortness of breath.    All other systems reviewed and are negative.    As described in the HPI. Otherwise, remainder of ROS (14 systems) were negative.    Patient Active Problem List   Diagnosis    Kidney stone    CARLOS (stress urinary incontinence, female)    Allergic rhinitis    Class 2 obesity    Depression    Hyperlipidemia    SOB (shortness of breath)    Mild persistent asthma with acute exacerbation    Family history of asthma and other chronic lower respiratory diseases         Current Outpatient Medications:     Airsupra 90-80 MCG/ACT aerosol, Inhale 2 puffs As Needed., Disp: , Rfl:     albuterol (PROVENTIL) (2.5 MG/3ML) 0.083% nebulizer solution, Take 2.5 mg by nebulization Every 6 (Six) Hours As Needed., Disp: , Rfl:     azelastine (ASTELIN) 0.1 % nasal spray, 2 sprays into the nostril(s) as directed by provider 2 (Two) Times a Day. Use in each nostril as directed, Disp: 30 mL, Rfl: 12    budesonide-formoterol (SYMBICORT) 160-4.5 MCG/ACT inhaler, Inhale 2 puffs 2 (Two) Times a Day., Disp: , Rfl:     cetirizine (zyrTEC) 10 MG tablet, Take 1 tablet by mouth Daily., Disp: , Rfl:     Chlorcyclizine-Pseudoephed 25-60 MG tablet, Every 8 (Eight)  Hours., Disp: , Rfl:     EPINEPHrine 0.3 MG/0.3ML solution prefilled syringe, Inject  as directed., Disp: , Rfl:     fluticasone (FLONASE) 50 MCG/ACT nasal spray, USE 2 SPRAY(S) IN EACH NOSTRIL ONCE DAILY AS NEEDED FOR RHINITIS OR ALLERGIES (Patient taking differently: 1 spray into the nostril(s) as directed by provider Every Night.), Disp: 48 g, Rfl: 0    metFORMIN ER (GLUCOPHAGE-XR) 500 MG 24 hr tablet, Take 1 tablet by mouth Daily With Breakfast., Disp: 90 tablet, Rfl: 0    montelukast (Singulair) 10 MG tablet, Take 1 tablet by mouth Every Night., Disp: 90 tablet, Rfl: 3    simvastatin (ZOCOR) 80 MG tablet, Take 1 tablet by mouth Every Night., Disp: 90 tablet, Rfl: 0    methylcellulose, Laxative, (CITRUCEL) 500 MG tablet tablet, Take 2 tablets by mouth Every 4 (Four) Hours As Needed. (Patient not taking: Reported on 4/23/2024), Disp: , Rfl:     Allergies   Allergen Reactions    Other Diarrhea and Nausea And Vomiting     Dairy       Past Medical History:   Diagnosis Date    Allergic 3/2000    took shots for six years    Anxiety     Asthma     Chronic allergic rhinitis     COVID 10/22/2023    NO CURRENT  SYMPTOMS    Diverticulosis August 2020    Emotional depression 02/26/2019    High cholesterol     Kidney calculus     Kidney disease     Kidney stones     Medication management 02/26/2019    Obesity January 1996    Renal cyst 07/2020    seen on CT scan 07/2020    Urinary tract infection 04/29/2014    Vaginal bleeding      Past Surgical History:   Procedure Laterality Date    COLONOSCOPY      GUM SURGERY      gum graft    KIDNEY STONE SURGERY      unspecified    TUBAL ABDOMINAL LIGATION  1986    URETEROSCOPY LASER LITHOTRIPSY WITH STENT INSERTION Left 11/2/2023    Procedure: URETEROSCOPY LASER LITHOTRIPSY WITH STENT INSERTION;  Surgeon: Griselda Mckenna MD;  Location: [facility] MAIN OR;  Service: Urology;  Laterality: Left;    WISDOM TOOTH EXTRACTION  2001     Social History     Socioeconomic History    Marital  "status:    Tobacco Use    Smoking status: Never    Smokeless tobacco: Never   Vaping Use    Vaping status: Never Used   Substance and Sexual Activity    Alcohol use: Never    Drug use: Never    Sexual activity: Not Currently     Partners: Male     Comment: none     Family History   Problem Relation Age of Onset    Lung disease Mother     Asthma Mother     Heart disease Father     Heart attack Father 68    Heart disease Sister     Diabetes Sister     Cancer Sister         precancer    Cancer Sister         precancer    COPD Sister         passed away    Diabetes Sister         passed away    Heart disease Sister     COPD Sister     Heart disease Brother         Brother with Mitral Valve Replacement    Cancer Brother         passed away    Rectal cancer Other     Colon cancer Other         60s       CT Chest Without Contrast Diagnostic    Result Date: 3/12/2024    1. Scattered subpleural ground-glass opacities in the lower lobes right greater than left which could relate to nonspecific infectious or inflammatory process.  No focal consolidation. 2. Coronary atherosclerotic calcifications. 3. Hepatic steatosis. 4. Additional chronic findings above.     MALINDA MODI MD       Electronically Signed and Approved By: MALINDA MODI MD on 3/12/2024 at 14:29             XR Chest 2 View    Result Date: 2/16/2024   No active disease is seen.     HUYEN WILLARD MD       Electronically Signed and Approved By: HUYEN WILLARD MD on 2/16/2024 at 12:54                  Objective     Blood pressure 143/80, pulse 90, temperature 98 °F (36.7 °C), temperature source Tympanic, resp. rate 14, height 162.6 cm (64\"), weight 97.5 kg (215 lb), SpO2 96%.  Physical Exam  Vitals and nursing note reviewed.   Constitutional:       Appearance: She is obese.   HENT:      Head: Normocephalic and atraumatic.      Nose: Congestion present.      Mouth/Throat:      Mouth: Mucous membranes are moist.      Pharynx: Oropharynx is clear.   Eyes:      " Extraocular Movements: Extraocular movements intact.      Conjunctiva/sclera: Conjunctivae normal.      Pupils: Pupils are equal, round, and reactive to light.   Cardiovascular:      Rate and Rhythm: Normal rate and regular rhythm.      Pulses: Normal pulses.      Heart sounds: Normal heart sounds.   Pulmonary:      Effort: Pulmonary effort is normal.      Breath sounds: Normal breath sounds.   Abdominal:      General: Abdomen is flat. Bowel sounds are normal.      Palpations: Abdomen is soft.   Musculoskeletal:         General: Normal range of motion.      Cervical back: Normal range of motion and neck supple.   Skin:     General: Skin is warm.      Capillary Refill: Capillary refill takes 2 to 3 seconds.   Neurological:      General: No focal deficit present.      Mental Status: She is alert and oriented to person, place, and time.   Psychiatric:         Mood and Affect: Mood normal.         Behavior: Behavior normal.       Immunization History   Administered Date(s) Administered    COVID-19 (MODERNA) 1st,2nd,3rd Dose Monovalent 04/05/2021, 05/04/2021, 01/09/2022    Flu Vaccine Quad PF >36MO 10/30/2021    Flublok 18+yrs 10/30/2020    Fluzone (or Fluarix & Flulaval for VFC) >6mos 10/30/2018, 10/31/2019, 10/30/2021, 10/28/2022, 11/29/2023    Hepatitis A 02/26/2019, 08/27/2019    Influenza, Unspecified 10/30/2018, 10/31/2019, 10/14/2020    Pneumococcal Conjugate 20-Valent (PCV20) 03/25/2024    Shingrix 03/25/2024    Td (TDVAX) 07/24/1996    Tdap 04/27/2020            Assessment & Plan     Diagnoses and all orders for this visit:    1. History of COVID-19 (Primary)    2. Dyspnea on exertion    3. Morbid (severe) obesity due to excess calories         Result Review :       Data reviewed : Radiologic studies CT chest      Discussion/ Recommendations:   CT chest reviewed shows peripheral groundglass opacities lower lobes mainly subpleural likely sequelae of previous COVID infection  PFTs are suggestive of restrictive  defect again likely post-COVID  Patient is advised regular exercise  Advised weight reduction  Continue air supra as needed  Discussed vaccination and recommended                Return in about 2 months (around 6/23/2024).      Thank you for allowing me to participate in the care of Marilee De La Rosa. Please do not hesitate to contact me with any questions.         This document has been electronically signed by Rusty Lang MD on April 23, 2024 13:54 EDT

## 2024-04-25 ENCOUNTER — OFFICE VISIT (OUTPATIENT)
Dept: INTERNAL MEDICINE | Facility: CLINIC | Age: 64
End: 2024-04-25
Payer: COMMERCIAL

## 2024-04-25 VITALS
OXYGEN SATURATION: 98 % | SYSTOLIC BLOOD PRESSURE: 128 MMHG | TEMPERATURE: 97.1 F | HEIGHT: 64 IN | RESPIRATION RATE: 18 BRPM | DIASTOLIC BLOOD PRESSURE: 70 MMHG | WEIGHT: 217.4 LBS | BODY MASS INDEX: 37.11 KG/M2 | HEART RATE: 86 BPM

## 2024-04-25 DIAGNOSIS — J98.4 RESTRICTIVE LUNG DISEASE: ICD-10-CM

## 2024-04-25 DIAGNOSIS — E78.2 MIXED HYPERLIPIDEMIA: Primary | ICD-10-CM

## 2024-04-25 DIAGNOSIS — R06.02 SHORTNESS OF BREATH: ICD-10-CM

## 2024-04-25 DIAGNOSIS — E11.9 TYPE 2 DIABETES MELLITUS WITHOUT COMPLICATION, WITHOUT LONG-TERM CURRENT USE OF INSULIN: ICD-10-CM

## 2024-04-25 RX ORDER — ROSUVASTATIN CALCIUM 10 MG/1
10 TABLET, COATED ORAL NIGHTLY
Qty: 90 TABLET | Refills: 0 | Status: SHIPPED | OUTPATIENT
Start: 2024-04-25

## 2024-04-25 RX ORDER — ROSUVASTATIN CALCIUM 5 MG/1
5 TABLET, COATED ORAL DAILY
Qty: 90 TABLET | Refills: 0 | Status: SHIPPED | OUTPATIENT
Start: 2024-04-25 | End: 2024-04-25

## 2024-04-25 NOTE — PROGRESS NOTES
"Chief Complaint  Thrush (4 week follow up ), Hyperlipidemia (Follow up on Increasing Zocor to 80 mg daily), and Diabetes (Follow up Metformin)    Subjective          Marilee De La Rosa presents to [facility]  History of Present Illness  DM-having diarrhea with metformin that is severe, had incontinence  HLD-having some leg cramps  She has been exercising more too  Thrush-resolved  Recently saw pulm, concern that symptoms are r/t recent covid  Low suspicion for asthma  Not noticing a significant change with Symbicort    Has family hx of heart valve issues  Has not had echo yet due to cost  Objective   Vital Signs:   /70 (BP Location: Left arm, Patient Position: Sitting, Cuff Size: Large Adult)   Pulse 86   Temp 97.1 °F (36.2 °C)   Resp 18   Ht 162.6 cm (64\")   Wt 98.6 kg (217 lb 6.4 oz)   SpO2 98%   BMI 37.32 kg/m²     Physical Exam  Vitals and nursing note reviewed.   Constitutional:       General: She is not in acute distress.     Appearance: Normal appearance.   HENT:      Head: Normocephalic and atraumatic.      Right Ear: External ear normal.      Left Ear: External ear normal.      Nose: Nose normal.      Mouth/Throat:      Mouth: Mucous membranes are moist.   Eyes:      Conjunctiva/sclera: Conjunctivae normal.   Cardiovascular:      Rate and Rhythm: Normal rate and regular rhythm.      Pulses: Normal pulses.      Heart sounds: Normal heart sounds. No murmur heard.     No friction rub. No gallop.   Pulmonary:      Effort: Pulmonary effort is normal. No respiratory distress.      Breath sounds: No wheezing, rhonchi or rales.   Musculoskeletal:      Cervical back: Neck supple.      Right lower leg: No edema.      Left lower leg: No edema.   Skin:     General: Skin is warm and dry.   Neurological:      General: No focal deficit present.      Mental Status: She is alert and oriented to person, place, and time.   Psychiatric:         Mood and Affect: " Mood normal.         Behavior: Behavior normal.        Result Review :          Procedures      Assessment and Plan    Diagnoses and all orders for this visit:    1. Mixed hyperlipidemia (Primary)  Comments:  Stopping simvastatin due to leg cramps.  Starting Crestor 10 mg.  Recheck labs in 3 months    2. Type 2 diabetes mellitus without complication, without long-term current use of insulin  Comments:  Stopping metformin due to GI side effect intolerance.  Starting Jardiance 10 mg.  Risk benefits discussed.  Orders:  -     CBC & Differential; Future  -     Comprehensive Metabolic Panel; Future  -     Hemoglobin A1c; Future  -     Lipid Panel; Future  -     TSH; Future    3. Shortness of breath  Comments:  She will consider getting echo in the future given family history of valvular heart disease    4. Restrictive lung disease  Comments:  She has stopped Symbicort.  Will follow-up with pulm tomorrow.    Other orders  -     empagliflozin (Jardiance) 10 MG tablet tablet; Take 1 tablet by mouth Daily.  Dispense: 90 tablet; Refill: 0  -     Discontinue: rosuvastatin (Crestor) 5 MG tablet; Take 1 tablet by mouth Daily.  Dispense: 90 tablet; Refill: 0  -     rosuvastatin (Crestor) 10 MG tablet; Take 1 tablet by mouth Every Night.  Dispense: 90 tablet; Refill: 0              Follow Up   Return in about 3 months (around 7/25/2024).  Patient was given instructions and counseling regarding her condition or for health maintenance advice. Please see specific information pulled into the AVS if appropriate.

## 2024-04-29 ENCOUNTER — PATIENT ROUNDING (BHMG ONLY) (OUTPATIENT)
Dept: PULMONOLOGY | Facility: CLINIC | Age: 64
End: 2024-04-29
Payer: COMMERCIAL

## 2024-04-29 NOTE — PROGRESS NOTES
April 29, 2024    Hello, may I speak with Marilee De La Rosa?    My name is Siomara      I am  with [facility]IC[facility]  [address]  [address]  [place] [address] [phone].    Before we get started may I verify your date of birth? 1960    I am calling to officially welcome you to our practice and ask about your recent visit. Is this a good time to talk? My chart message sent for patient rounding.

## 2024-06-03 ENCOUNTER — PRIOR AUTHORIZATION (OUTPATIENT)
Dept: INTERNAL MEDICINE | Facility: CLINIC | Age: 64
End: 2024-06-03
Payer: COMMERCIAL

## 2024-06-03 NOTE — TELEPHONE ENCOUNTER
Jardiance 10MG tablets    As long as you remain covered by your prescription drug plan and there are no changes to your  plan benefits, this request is approved from 06/03/2024 to 06/03/2027.

## 2024-06-24 ENCOUNTER — OFFICE VISIT (OUTPATIENT)
Dept: PULMONOLOGY | Facility: CLINIC | Age: 64
End: 2024-06-24
Payer: COMMERCIAL

## 2024-06-24 VITALS
WEIGHT: 206.4 LBS | HEIGHT: 64 IN | DIASTOLIC BLOOD PRESSURE: 72 MMHG | BODY MASS INDEX: 35.24 KG/M2 | SYSTOLIC BLOOD PRESSURE: 178 MMHG | TEMPERATURE: 97.8 F | RESPIRATION RATE: 8 BRPM | OXYGEN SATURATION: 96 % | HEART RATE: 83 BPM

## 2024-06-24 DIAGNOSIS — J45.20 MILD INTERMITTENT ASTHMA, UNSPECIFIED WHETHER COMPLICATED: Primary | ICD-10-CM

## 2024-06-24 DIAGNOSIS — Z86.16 HISTORY OF COVID-19: ICD-10-CM

## 2024-06-24 DIAGNOSIS — E66.01 MORBID (SEVERE) OBESITY DUE TO EXCESS CALORIES: ICD-10-CM

## 2024-06-24 PROCEDURE — 99213 OFFICE O/P EST LOW 20 MIN: CPT | Performed by: INTERNAL MEDICINE

## 2024-06-24 NOTE — PROGRESS NOTES
Pulmonary Office Follow-up    Subjective     Marilee De La Rosa is seen today at the office for   Chief Complaint   Patient presents with    Follow-up     2 MONTH    Shortness of Breath    Results    Asthma         HPI  Marilee De La Rosa is a 63 y.o. female with a PMH significant for post-COVID infection and asthma presents for follow-up patient has been doing well on her current medications and takes albuterol only occasionally she denies any significant cough or wheezing and has reduced her weight      Tobacco use history:  Never smoker      Patient Active Problem List   Diagnosis    Kidney stone    CARLOS (stress urinary incontinence, female)    Allergic rhinitis    Class 2 obesity    Depression    Hyperlipidemia    SOB (shortness of breath)    Mild persistent asthma with acute exacerbation    Family history of asthma and other chronic lower respiratory diseases       Review of Systems  Review of Systems   Respiratory:  Positive for shortness of breath.    All other systems reviewed and are negative.    As described in the HPI. Otherwise, remainder of ROS (14 systems) were negative.    Medications, Allergies, Social, and Family Histories reviewed as per EMR.    Result Review :            Objective     Vitals:    06/24/24 1446   BP: 178/72   Pulse: 83   Resp: 8   Temp: 97.8 °F (36.6 °C)   SpO2: 96%         06/24/24  1446   Weight: 93.6 kg (206 lb 6.4 oz)       Physical Exam  Vitals and nursing note reviewed.   Constitutional:       Appearance: Normal appearance.   HENT:      Head: Normocephalic and atraumatic.      Nose: Nose normal.      Mouth/Throat:      Mouth: Mucous membranes are moist.      Pharynx: Oropharynx is clear.   Eyes:      Extraocular Movements: Extraocular movements intact.      Conjunctiva/sclera: Conjunctivae normal.      Pupils: Pupils are equal, round, and reactive to light.   Cardiovascular:      Rate and Rhythm: Normal rate and regular rhythm.      Pulses: Normal pulses.      Heart  sounds: Normal heart sounds.   Pulmonary:      Effort: Pulmonary effort is normal.      Breath sounds: Normal breath sounds.   Abdominal:      General: Abdomen is flat. Bowel sounds are normal.      Palpations: Abdomen is soft.   Musculoskeletal:         General: Normal range of motion.      Cervical back: Normal range of motion and neck supple.   Skin:     General: Skin is warm.      Capillary Refill: Capillary refill takes 2 to 3 seconds.   Neurological:      General: No focal deficit present.      Mental Status: She is alert and oriented to person, place, and time.   Psychiatric:         Mood and Affect: Mood normal.         Behavior: Behavior normal.         No radiology results for the last 90 days.     Assessment & Plan     Diagnoses and all orders for this visit:    1. Mild intermittent asthma, unspecified whether complicated (Primary)    2. History of COVID-19    3. Morbid (severe) obesity due to excess calories         Discussion/ Recommendations:   Patient is advised to continue her Symbicort inhaler every 12  Air supra as needed  Advised to reduce weight and regular exercise her BMI is 35.43  Vaccinations discussed and recommended             Return in about 6 months (around 12/24/2024).          This document has been electronically signed by Rusty Lang MD on June 24, 2024 14:51 EDT

## 2024-07-16 RX ORDER — ROSUVASTATIN CALCIUM 10 MG/1
10 TABLET, COATED ORAL NIGHTLY
Qty: 90 TABLET | Refills: 0 | Status: SHIPPED | OUTPATIENT
Start: 2024-07-16

## 2024-07-18 ENCOUNTER — CLINICAL SUPPORT (OUTPATIENT)
Dept: INTERNAL MEDICINE | Facility: CLINIC | Age: 64
End: 2024-07-18
Payer: COMMERCIAL

## 2024-07-18 DIAGNOSIS — E11.9 TYPE 2 DIABETES MELLITUS WITHOUT COMPLICATION, WITHOUT LONG-TERM CURRENT USE OF INSULIN: ICD-10-CM

## 2024-07-18 LAB
ALBUMIN SERPL-MCNC: 4 G/DL (ref 3.5–5.2)
ALBUMIN/GLOB SERPL: 1.3 G/DL
ALP SERPL-CCNC: 108 U/L (ref 39–117)
ALT SERPL W P-5'-P-CCNC: 13 U/L (ref 1–33)
ANION GAP SERPL CALCULATED.3IONS-SCNC: 12.9 MMOL/L (ref 5–15)
AST SERPL-CCNC: 19 U/L (ref 1–32)
BASOPHILS # BLD AUTO: 0.03 10*3/MM3 (ref 0–0.2)
BASOPHILS NFR BLD AUTO: 0.5 % (ref 0–1.5)
BILIRUB SERPL-MCNC: 0.3 MG/DL (ref 0–1.2)
BUN SERPL-MCNC: 14 MG/DL (ref 8–23)
BUN/CREAT SERPL: 17.7 (ref 7–25)
CALCIUM SPEC-SCNC: 9.2 MG/DL (ref 8.6–10.5)
CHLORIDE SERPL-SCNC: 104 MMOL/L (ref 98–107)
CHOLEST SERPL-MCNC: 151 MG/DL (ref 0–200)
CO2 SERPL-SCNC: 25.1 MMOL/L (ref 22–29)
CREAT SERPL-MCNC: 0.79 MG/DL (ref 0.57–1)
DEPRECATED RDW RBC AUTO: 40.2 FL (ref 37–54)
EGFRCR SERPLBLD CKD-EPI 2021: 84.7 ML/MIN/1.73
EOSINOPHIL # BLD AUTO: 0.18 10*3/MM3 (ref 0–0.4)
EOSINOPHIL NFR BLD AUTO: 2.7 % (ref 0.3–6.2)
ERYTHROCYTE [DISTWIDTH] IN BLOOD BY AUTOMATED COUNT: 13.2 % (ref 12.3–15.4)
GLOBULIN UR ELPH-MCNC: 3.2 GM/DL
GLUCOSE SERPL-MCNC: 93 MG/DL (ref 65–99)
HBA1C MFR BLD: 6.5 % (ref 4.8–5.6)
HCT VFR BLD AUTO: 40.9 % (ref 34–46.6)
HDLC SERPL-MCNC: 38 MG/DL (ref 40–60)
HGB BLD-MCNC: 12.7 G/DL (ref 12–15.9)
IMM GRANULOCYTES # BLD AUTO: 0.03 10*3/MM3 (ref 0–0.05)
IMM GRANULOCYTES NFR BLD AUTO: 0.5 % (ref 0–0.5)
LDLC SERPL CALC-MCNC: 83 MG/DL (ref 0–100)
LDLC/HDLC SERPL: 2.07 {RATIO}
LYMPHOCYTES # BLD AUTO: 1.99 10*3/MM3 (ref 0.7–3.1)
LYMPHOCYTES NFR BLD AUTO: 30.2 % (ref 19.6–45.3)
MCH RBC QN AUTO: 26.1 PG (ref 26.6–33)
MCHC RBC AUTO-ENTMCNC: 31.1 G/DL (ref 31.5–35.7)
MCV RBC AUTO: 84.2 FL (ref 79–97)
MONOCYTES # BLD AUTO: 0.48 10*3/MM3 (ref 0.1–0.9)
MONOCYTES NFR BLD AUTO: 7.3 % (ref 5–12)
NEUTROPHILS NFR BLD AUTO: 3.87 10*3/MM3 (ref 1.7–7)
NEUTROPHILS NFR BLD AUTO: 58.8 % (ref 42.7–76)
NRBC BLD AUTO-RTO: 0 /100 WBC (ref 0–0.2)
PLATELET # BLD AUTO: 277 10*3/MM3 (ref 140–450)
PMV BLD AUTO: 11.6 FL (ref 6–12)
POTASSIUM SERPL-SCNC: 3.9 MMOL/L (ref 3.5–5.2)
PROT SERPL-MCNC: 7.2 G/DL (ref 6–8.5)
RBC # BLD AUTO: 4.86 10*6/MM3 (ref 3.77–5.28)
SODIUM SERPL-SCNC: 142 MMOL/L (ref 136–145)
TRIGL SERPL-MCNC: 172 MG/DL (ref 0–150)
TSH SERPL DL<=0.05 MIU/L-ACNC: 2.73 UIU/ML (ref 0.27–4.2)
VLDLC SERPL-MCNC: 30 MG/DL (ref 5–40)
WBC NRBC COR # BLD AUTO: 6.58 10*3/MM3 (ref 3.4–10.8)

## 2024-07-18 PROCEDURE — 83036 HEMOGLOBIN GLYCOSYLATED A1C: CPT | Performed by: NURSE PRACTITIONER

## 2024-07-18 PROCEDURE — 80050 GENERAL HEALTH PANEL: CPT | Performed by: NURSE PRACTITIONER

## 2024-07-18 PROCEDURE — 80061 LIPID PANEL: CPT | Performed by: NURSE PRACTITIONER

## 2024-07-18 PROCEDURE — 36415 COLL VENOUS BLD VENIPUNCTURE: CPT | Performed by: NURSE PRACTITIONER

## 2024-07-18 NOTE — PROGRESS NOTES
Venipuncture Blood Specimen Collection  Venipuncture performed in [facility] by Kelly Gomez RN with good hemostasis. Patient tolerated the procedure well without complications.   07/18/24   Kelly Gomez RN

## 2024-07-22 NOTE — TELEPHONE ENCOUNTER
Caller: Marilee De La Rosa    Relationship: Self    Best call back number: [phone]    What medication are you requesting: THRUSH MEDICATION     How long have you been experiencing symptoms: 2-3 DAYS     Have you had these symptoms before:    [] Yes  [x] No    Have you been treated for these symptoms before:   [] Yes  [x] No    If a prescription is needed, what is your preferred pharmacy and phone number:  [address] [phone] [facility] [phone]   [phone]    Additional notes: PATIENT STATED SHE HAS THRUSH FROM TAKING ANTIBIOTICS. PLEASE CALL AND ADVISE.      I reviewed the resident/fellow's documentation and discussed the patient with the resident/fellow. I agree with the resident/fellow's medical decision making as documented in the note.   VS noted.  BP at home was normal.      Yuli العلي MD

## 2024-07-25 ENCOUNTER — OFFICE VISIT (OUTPATIENT)
Dept: INTERNAL MEDICINE | Facility: CLINIC | Age: 64
End: 2024-07-25
Payer: COMMERCIAL

## 2024-07-25 VITALS
WEIGHT: 206 LBS | OXYGEN SATURATION: 97 % | HEART RATE: 74 BPM | TEMPERATURE: 97.8 F | DIASTOLIC BLOOD PRESSURE: 78 MMHG | RESPIRATION RATE: 18 BRPM | SYSTOLIC BLOOD PRESSURE: 126 MMHG | BODY MASS INDEX: 35.17 KG/M2 | HEIGHT: 64 IN

## 2024-07-25 DIAGNOSIS — E78.2 MIXED HYPERLIPIDEMIA: ICD-10-CM

## 2024-07-25 DIAGNOSIS — E66.09 CLASS 2 OBESITY DUE TO EXCESS CALORIES WITHOUT SERIOUS COMORBIDITY WITH BODY MASS INDEX (BMI) OF 35.0 TO 35.9 IN ADULT: ICD-10-CM

## 2024-07-25 DIAGNOSIS — R23.9 SKIN CHANGE: Primary | ICD-10-CM

## 2024-07-25 DIAGNOSIS — H93.90 EAR LESION: ICD-10-CM

## 2024-07-25 DIAGNOSIS — E11.9 TYPE 2 DIABETES MELLITUS WITHOUT COMPLICATION, WITHOUT LONG-TERM CURRENT USE OF INSULIN: ICD-10-CM

## 2024-07-25 PROCEDURE — 99214 OFFICE O/P EST MOD 30 MIN: CPT | Performed by: NURSE PRACTITIONER

## 2024-07-25 NOTE — PROGRESS NOTES
"Chief Complaint  Diabetes (3 month follow up ), Hyperlipidemia, Sore Throat (1 week), URI (Chest congestion and nasal congestion- negative at home covid yesterday. Contact to COVID on Monday.), and Skin Problem (Left ear mole concern. /Patch of eczema left shin-1 month no pain and no itching. Family history of eczema. )    Subjective          Marilee De La Rosa presents to [facility]  History of Present Illness  DM- weight loss (13 lbs in 4 months)  Has been taking 45 min walks every morning.    HLD- no leg cramps    Sore throat, head and chest congestion, cough x 1 week. Denies fever, facial pain.  Uses a sinus rinse every night.    Feels like something in left ear possibly a mole    Small red patch of flaky, skin on left shin. States she noticed it a month ago, has not changed or increased in size. Denies pain or itch    Objective   Vital Signs:   /78 (BP Location: Left arm, Patient Position: Sitting, Cuff Size: Adult)   Pulse 74   Temp 97.8 °F (36.6 °C)   Resp 18   Ht 162.6 cm (64\")   Wt 93.4 kg (206 lb)   SpO2 97%   BMI 35.36 kg/m²     Physical Exam  Vitals and nursing note reviewed.   Constitutional:       General: She is not in acute distress.     Appearance: Normal appearance.   HENT:      Head: Normocephalic and atraumatic.      Right Ear: External ear normal.      Left Ear: External ear normal.      Ears:        Comments: Small round papular raised lesion with erythema, swelling or drainage     Nose: Nose normal.      Mouth/Throat:      Mouth: Mucous membranes are moist.   Eyes:      Conjunctiva/sclera: Conjunctivae normal.   Cardiovascular:      Rate and Rhythm: Normal rate and regular rhythm.      Pulses: Normal pulses.      Heart sounds: Normal heart sounds. No murmur heard.     No friction rub. No gallop.   Pulmonary:      Effort: Pulmonary effort is normal. No respiratory distress.      Breath sounds: No wheezing, rhonchi or rales. "   Musculoskeletal:      Cervical back: Neck supple.      Right lower leg: No edema.      Left lower leg: No edema.   Skin:     General: Skin is warm and dry.             Comments: 1-2cm erythematous patch on left shin, blanches   Neurological:      General: No focal deficit present.      Mental Status: She is alert and oriented to person, place, and time.   Psychiatric:         Mood and Affect: Mood normal.         Behavior: Behavior normal.        Result Review :          Procedures      Assessment and Plan    Diagnoses and all orders for this visit:    1. Skin change (Primary)  Comments:  Changes consistent with sun damage.  Low suspicion for malignant process.  Discussed watchful waiting.    2. Ear lesion  Comments:  Difficult to evaluate and ear canal.  Low suspicion for infectious etiology.  Will monitor.  Patient will call with any new or worsening to send ENT    3. Mixed hyperlipidemia  Comments:  Well-controlled, continue current meds    4. Type 2 diabetes mellitus without complication, without long-term current use of insulin  Comments:  Well-controlled.  She will will continue on current medications.    5. Class 2 obesity due to excess calories without serious comorbidity with body mass index (BMI) of 35.0 to 35.9 in adult  Comments:  Doing well with healthy lifestyle modification.  Down 13 pounds in the last 4 months.              Follow Up   Return in about 3 months (around 10/25/2024).  Patient was given instructions and counseling regarding her condition or for health maintenance advice. Please see specific information pulled into the AVS if appropriate.

## 2024-09-11 RX ORDER — EMPAGLIFLOZIN 10 MG/1
10 TABLET, FILM COATED ORAL DAILY
Qty: 90 TABLET | Refills: 0 | Status: SHIPPED | OUTPATIENT
Start: 2024-09-11

## 2024-10-21 RX ORDER — ROSUVASTATIN CALCIUM 10 MG/1
10 TABLET, COATED ORAL NIGHTLY
Qty: 90 TABLET | Refills: 0 | Status: SHIPPED | OUTPATIENT
Start: 2024-10-21

## 2024-10-25 ENCOUNTER — TELEPHONE (OUTPATIENT)
Dept: INTERNAL MEDICINE | Facility: CLINIC | Age: 64
End: 2024-10-25

## 2024-10-25 NOTE — TELEPHONE ENCOUNTER
Caller: Marilee De La Rosa    Relationship: Self    Best call back number: [phone]     What is the best time to reach you: ANY    Who are you requesting to speak with (clinical staff, provider,  specific staff member): CLINICAL STAFF    What was the call regarding: PATIENT CALLED WANTING TO KNOW WHEN HER LAST SHINGLES SHOT WAS AND WHEN TO GET HER SECOND

## 2024-11-20 DIAGNOSIS — N20.0 KIDNEY STONE: Primary | ICD-10-CM

## 2024-11-21 ENCOUNTER — HOSPITAL ENCOUNTER (OUTPATIENT)
Dept: GENERAL RADIOLOGY | Facility: HOSPITAL | Age: 64
Discharge: HOME | End: 2024-11-21
Admitting: UROLOGY
Payer: COMMERCIAL

## 2024-11-21 DIAGNOSIS — N20.0 KIDNEY STONE: ICD-10-CM

## 2024-11-21 PROCEDURE — 74018 RADEX ABDOMEN 1 VIEW: CPT

## 2024-11-25 ENCOUNTER — TELEPHONE (OUTPATIENT)
Dept: UROLOGY | Age: 64
End: 2024-11-25

## 2024-11-25 ENCOUNTER — OFFICE VISIT (OUTPATIENT)
Dept: UROLOGY | Age: 64
End: 2024-11-25
Payer: COMMERCIAL

## 2024-11-25 VITALS
BODY MASS INDEX: 35.17 KG/M2 | WEIGHT: 206 LBS | HEIGHT: 64 IN | SYSTOLIC BLOOD PRESSURE: 168 MMHG | DIASTOLIC BLOOD PRESSURE: 97 MMHG

## 2024-11-25 DIAGNOSIS — N20.0 KIDNEY STONE: Primary | ICD-10-CM

## 2024-11-25 DIAGNOSIS — R82.90 URINE ABNORMALITY: ICD-10-CM

## 2024-11-25 LAB
BILIRUB BLD-MCNC: NEGATIVE MG/DL
CLARITY, POC: CLEAR
COLOR UR: YELLOW
EXPIRATION DATE: ABNORMAL
GLUCOSE UR STRIP-MCNC: ABNORMAL MG/DL
KETONES UR QL: NEGATIVE
LEUKOCYTE EST, POC: NEGATIVE
Lab: ABNORMAL
NITRITE UR-MCNC: NEGATIVE MG/ML
PH UR: 5 [PH] (ref 5–8)
PROT UR STRIP-MCNC: NEGATIVE MG/DL
RBC # UR STRIP: NEGATIVE /UL
SP GR UR: 1.02 (ref 1–1.03)
UROBILINOGEN UR QL: ABNORMAL

## 2024-11-25 RX ORDER — POTASSIUM CITRATE 10 MEQ/1
10 TABLET, EXTENDED RELEASE ORAL
Qty: 270 TABLET | Refills: 3 | Status: SHIPPED | OUTPATIENT
Start: 2024-11-25 | End: 2025-11-20

## 2024-11-25 NOTE — PROGRESS NOTES
"Chief Complaint  Kidney stone    Subjective          Marilee De La Rosa presents to [facility]    History of Present Illness  Patient's not passed any stones that she is aware of.  Recent KUB reveals a few small 2 to 3 mm stones in the left kidney.  No stones are seen on the right.      She did have a 24-hour Litholink urine collection last year.  Her pH was low but otherwise most of her parameters are actually pretty good.  Will start her on some potassium citrate to see if that will help prevent her stone formation.      Objective   Vital Signs:   /97   Ht 162.6 cm (64\")   Wt 93.4 kg (206 lb)   BMI 35.36 kg/m²       Physical Exam  Vitals and nursing note reviewed.   Constitutional:       Appearance: Normal appearance. She is well-developed.   Pulmonary:      Effort: Pulmonary effort is normal.      Breath sounds: Normal air entry.   Neurological:      Mental Status: She is alert and oriented to person, place, and time.      Motor: Motor function is intact.   Psychiatric:         Mood and Affect: Mood normal.         Behavior: Behavior normal.          Result Review :   The following data was reviewed by: Griselda Mckenna MD on 11/25/2024:    Results for orders placed or performed in visit on 11/25/24   POC Urinalysis Dipstick, Automated    Collection Time: 11/25/24  9:08 AM    Specimen: Urine   Result Value Ref Range    Color Yellow Yellow, Straw, Dark Yellow, Anitha    Clarity, UA Clear Clear    Specific Gravity  1.020 1.005 - 1.030    pH, Urine 5.0 5.0 - 8.0    Leukocytes Negative Negative    Nitrite, UA Negative Negative    Protein, POC Negative Negative mg/dL    Glucose, UA >=1000 mg/dL (3+) (A) Negative mg/dL    Ketones, UA Negative Negative    Urobilinogen, UA 0.2 E.U./dL Normal, 0.2 E.U./dL    Bilirubin Negative Negative    Blood, UA Negative Negative    Lot Number [id number]     Expiration Date 92,025            Data reviewed : Radiologic studies :    Results    Procedure " Component Value Ref Range Date/Time   XR Abdomen KUB [[id number]] Collected: 11/24/24 1650   Order Status: Completed Updated: 11/24/24 1654   Narrative:     XR ABDOMEN KUB    Date of Exam: 11/21/2024 3:05 PM EST    Indication: renal stones    Comparison: 10/13/2023    Findings:  Tubal ligation clips are present. No evidence of bowel obstruction. There is a phlebolith in the left lower pelvis. There appear to be a few 2 to 3 mm left renal calcifications. No right renal calcifications are identified.   Impression:     Impression:  A few 2 to 3 mm left renal calcifications.      Electronically Signed: Riaz Calvillo MD   11/24/2024 4:51 PM EST   Workstation ID: [id number]            Assessment and Plan    Diagnoses and all orders for this visit:    1. Kidney stone (Primary)  -     POC Urinalysis Dipstick, Automated  -     XR Abdomen KUB; Future  -     potassium citrate (UROCIT-K) 10 MEQ (1080 MG) CR tablet; Take 1 tablet by mouth 3 (Three) Times a Day With Meals for 360 days.  Dispense: 270 tablet; Refill: 3    2. Urine abnormality  -     POC Urinalysis Dipstick, Automated  -     potassium citrate (UROCIT-K) 10 MEQ (1080 MG) CR tablet; Take 1 tablet by mouth 3 (Three) Times a Day With Meals for 360 days.  Dispense: 270 tablet; Refill: 3    KUB and follow-up in 1 year.  She will call sooner if she has any other issues.  I did start her on potassium citrate due to her low urine pH on her Litholink from last year.          Follow Up       No follow-ups on file.  Patient was given instructions and counseling regarding her condition or for health maintenance advice. Please see specific information pulled into the AVS if appropriate.

## 2024-11-25 NOTE — TELEPHONE ENCOUNTER
Informed patient that Dr. Mckenna was able to review 24 hour urine results and sent in Potassium Citrate to pharmacy for stone prevention and to start taking that prescription. Advised that appointment for next year is ok and to call back with any problems. Patient voiced understanding.

## 2024-12-04 RX ORDER — EMPAGLIFLOZIN 10 MG/1
10 TABLET, FILM COATED ORAL DAILY
Qty: 90 TABLET | Refills: 0 | Status: SHIPPED | OUTPATIENT
Start: 2024-12-04

## 2024-12-16 ENCOUNTER — OFFICE VISIT (OUTPATIENT)
Dept: PULMONOLOGY | Facility: CLINIC | Age: 64
End: 2024-12-16
Payer: COMMERCIAL

## 2024-12-16 VITALS
TEMPERATURE: 97.5 F | WEIGHT: 209.2 LBS | SYSTOLIC BLOOD PRESSURE: 148 MMHG | HEART RATE: 69 BPM | RESPIRATION RATE: 14 BRPM | BODY MASS INDEX: 35.71 KG/M2 | DIASTOLIC BLOOD PRESSURE: 81 MMHG | OXYGEN SATURATION: 97 % | HEIGHT: 64 IN

## 2024-12-16 DIAGNOSIS — R09.82 POSTNASAL DRIP: ICD-10-CM

## 2024-12-16 DIAGNOSIS — E66.01 MORBID (SEVERE) OBESITY DUE TO EXCESS CALORIES: ICD-10-CM

## 2024-12-16 DIAGNOSIS — Z86.16 HISTORY OF COVID-19: ICD-10-CM

## 2024-12-16 DIAGNOSIS — J20.9 ACUTE BRONCHITIS, UNSPECIFIED ORGANISM: ICD-10-CM

## 2024-12-16 DIAGNOSIS — J45.20 MILD INTERMITTENT ASTHMA, UNSPECIFIED WHETHER COMPLICATED: Primary | ICD-10-CM

## 2024-12-16 PROCEDURE — 99214 OFFICE O/P EST MOD 30 MIN: CPT | Performed by: INTERNAL MEDICINE

## 2024-12-16 RX ORDER — PREDNISONE 10 MG/1
TABLET ORAL
Qty: 31 TABLET | Refills: 0 | Status: SHIPPED | OUTPATIENT
Start: 2024-12-16

## 2024-12-16 RX ORDER — AZITHROMYCIN 250 MG/1
TABLET, FILM COATED ORAL
Qty: 6 TABLET | Refills: 0 | Status: SHIPPED | OUTPATIENT
Start: 2024-12-16

## 2024-12-16 NOTE — PROGRESS NOTES
Pulmonary Office Follow-up    Subjective     Marilee De La Rosa is seen today at the office for   Chief Complaint   Patient presents with    Follow-up     6 month    Asthma    Results    Shortness of Breath    Cough     Congestion  History of covid 19         HPI  Marilee De La Rosa is a 64 y.o. female with a PMH significant for history of COVID infection along with mild intermittent asthma presents for follow-up patient complains of cough and chest congestion she has been having nasal drainage along with postnasal drip she denies any chest pain fever or hemoptysis patient does not smoke and there is no history of weight loss      Tobacco use history:  Never smoker      Patient Active Problem List   Diagnosis    Kidney stone    CARLOS (stress urinary incontinence, female)    Allergic rhinitis    Class 2 obesity    Depression    Hyperlipidemia    SOB (shortness of breath)    Mild persistent asthma with acute exacerbation    Family history of asthma and other chronic lower respiratory diseases       Review of Systems  Review of Systems   Respiratory:  Positive for cough.    All other systems reviewed and are negative.    As described in the HPI. Otherwise, remainder of ROS (14 systems) were negative.    Medications, Allergies, Social, and Family Histories reviewed as per EMR.    Result Review :            Objective     Vitals:    12/16/24 0903   BP: 148/81   Pulse: 69   Resp: 14   Temp: 97.5 °F (36.4 °C)   SpO2: 97%         12/16/24  0903   Weight: 94.9 kg (209 lb 3.2 oz)       Physical Exam  Vitals and nursing note reviewed.   Constitutional:       Appearance: She is obese.   HENT:      Head: Normocephalic and atraumatic.      Nose: Nose normal.      Mouth/Throat:      Pharynx: Oropharynx is clear.   Eyes:      Extraocular Movements: Extraocular movements intact.      Conjunctiva/sclera: Conjunctivae normal.      Pupils: Pupils are equal, round, and reactive to light.   Cardiovascular:      Rate and Rhythm: Normal  rate and regular rhythm.      Pulses: Normal pulses.      Heart sounds: Normal heart sounds.   Pulmonary:      Effort: Pulmonary effort is normal.      Breath sounds: Rhonchi present.   Musculoskeletal:         General: Normal range of motion.      Cervical back: Normal range of motion and neck supple.   Skin:     General: Skin is warm.      Capillary Refill: Capillary refill takes 2 to 3 seconds.   Neurological:      Mental Status: She is alert and oriented to person, place, and time.   Psychiatric:         Mood and Affect: Mood normal.         Behavior: Behavior normal.         XR Abdomen KUB    Result Date: 11/24/2024  Impression: A few 2 to 3 mm left renal calcifications. Electronically Signed: Riaz Calvillo MD  11/24/2024 4:51 PM EST  Workstation ID: [id number]      Assessment & Plan     Diagnoses and all orders for this visit:    1. Mild intermittent asthma, unspecified whether complicated (Primary)    2. Acute bronchitis, unspecified organism    3. Postnasal drip    4. History of COVID-19    5. Morbid (severe) obesity due to excess calories    Other orders  -     azithromycin (ZITHROMAX) 250 MG tablet; Take 2 by mouth today then 1 daily for 4 days  Dispense: 6 tablet; Refill: 0  -     predniSONE (DELTASONE) 10 MG tablet; Take 4 tabs daily x 3 days, then take 3 tabs daily x 3 days, then take 2 tabs daily x 3 days, then take 1 tab daily x 3 days  Dispense: 31 tablet; Refill: 0         Discussion/ Recommendations:   CT scan reviewed showing subpleural groundglass opacities likely secondary to old COVID infection mainly in the lung bases  PFTs were within normal limits except for restrictive defect  Patient is advised to reduce weight  Advise regular exercise  Will start her on Zithromax along with prednisone for bronchitis along with postnasal drip  Vaccinations discussed and recommended             Return in about 6 months (around 6/16/2025).          This document has been electronically signed by Rusty  MD Rickey on December 16, 2024 09:10 EST

## 2025-01-21 RX ORDER — ROSUVASTATIN CALCIUM 10 MG/1
10 TABLET, COATED ORAL NIGHTLY
Qty: 90 TABLET | Refills: 0 | Status: SHIPPED | OUTPATIENT
Start: 2025-01-21

## 2025-02-28 RX ORDER — EMPAGLIFLOZIN 10 MG/1
10 TABLET, FILM COATED ORAL DAILY
Qty: 30 TABLET | Refills: 0 | Status: SHIPPED | OUTPATIENT
Start: 2025-02-28 | End: 2025-03-03 | Stop reason: SDUPTHER

## 2025-02-28 NOTE — TELEPHONE ENCOUNTER
Patient is significantly past due for follow-up and labs.  I am sending Jardiance in for 30 days but I need her to schedule follow-up in that time so that we can check lab work and see how she is doing.

## 2025-03-03 ENCOUNTER — OFFICE VISIT (OUTPATIENT)
Dept: INTERNAL MEDICINE | Facility: CLINIC | Age: 65
End: 2025-03-03
Payer: COMMERCIAL

## 2025-03-03 VITALS
WEIGHT: 214 LBS | HEIGHT: 64 IN | HEART RATE: 75 BPM | DIASTOLIC BLOOD PRESSURE: 72 MMHG | BODY MASS INDEX: 36.54 KG/M2 | OXYGEN SATURATION: 98 % | TEMPERATURE: 98 F | SYSTOLIC BLOOD PRESSURE: 128 MMHG | RESPIRATION RATE: 16 BRPM

## 2025-03-03 DIAGNOSIS — L08.9 INFECTED WOUND: Primary | ICD-10-CM

## 2025-03-03 DIAGNOSIS — E11.9 TYPE 2 DIABETES MELLITUS WITHOUT COMPLICATION, WITHOUT LONG-TERM CURRENT USE OF INSULIN: ICD-10-CM

## 2025-03-03 DIAGNOSIS — L82.1 SK (SEBORRHEIC KERATOSIS): ICD-10-CM

## 2025-03-03 DIAGNOSIS — N28.9 RENAL INSUFFICIENCY: ICD-10-CM

## 2025-03-03 DIAGNOSIS — T14.8XXA INFECTED WOUND: Primary | ICD-10-CM

## 2025-03-03 LAB
ALBUMIN SERPL-MCNC: 4.2 G/DL (ref 3.5–5.2)
ALBUMIN UR-MCNC: <1.2 MG/DL
ALBUMIN/GLOB SERPL: 1.3 G/DL
ALP SERPL-CCNC: 114 U/L (ref 39–117)
ALT SERPL W P-5'-P-CCNC: 17 U/L (ref 1–33)
ANION GAP SERPL CALCULATED.3IONS-SCNC: 10.2 MMOL/L (ref 5–15)
AST SERPL-CCNC: 22 U/L (ref 1–32)
BASOPHILS # BLD AUTO: 0.06 10*3/MM3 (ref 0–0.2)
BASOPHILS NFR BLD AUTO: 0.9 % (ref 0–1.5)
BILIRUB SERPL-MCNC: 0.3 MG/DL (ref 0–1.2)
BUN SERPL-MCNC: 14 MG/DL (ref 8–23)
BUN/CREAT SERPL: 13.1 (ref 7–25)
CALCIUM SPEC-SCNC: 9.7 MG/DL (ref 8.6–10.5)
CHLORIDE SERPL-SCNC: 101 MMOL/L (ref 98–107)
CHOLEST SERPL-MCNC: 189 MG/DL (ref 0–200)
CO2 SERPL-SCNC: 25.8 MMOL/L (ref 22–29)
CREAT SERPL-MCNC: 1.07 MG/DL (ref 0.57–1)
CREAT UR-MCNC: 69.6 MG/DL
DEPRECATED RDW RBC AUTO: 39.3 FL (ref 37–54)
EGFRCR SERPLBLD CKD-EPI 2021: 58.5 ML/MIN/1.73
EOSINOPHIL # BLD AUTO: 0.2 10*3/MM3 (ref 0–0.4)
EOSINOPHIL NFR BLD AUTO: 2.9 % (ref 0.3–6.2)
ERYTHROCYTE [DISTWIDTH] IN BLOOD BY AUTOMATED COUNT: 13.5 % (ref 12.3–15.4)
GLOBULIN UR ELPH-MCNC: 3.3 GM/DL
GLUCOSE SERPL-MCNC: 89 MG/DL (ref 65–99)
HBA1C MFR BLD: 6.4 % (ref 4.8–5.6)
HCT VFR BLD AUTO: 42.4 % (ref 34–46.6)
HDLC SERPL-MCNC: 38 MG/DL (ref 40–60)
HGB BLD-MCNC: 13.6 G/DL (ref 12–15.9)
IMM GRANULOCYTES # BLD AUTO: 0.01 10*3/MM3 (ref 0–0.05)
IMM GRANULOCYTES NFR BLD AUTO: 0.1 % (ref 0–0.5)
LDLC SERPL CALC-MCNC: 116 MG/DL (ref 0–100)
LDLC/HDLC SERPL: 2.93 {RATIO}
LYMPHOCYTES # BLD AUTO: 2.04 10*3/MM3 (ref 0.7–3.1)
LYMPHOCYTES NFR BLD AUTO: 29.6 % (ref 19.6–45.3)
MCH RBC QN AUTO: 26.2 PG (ref 26.6–33)
MCHC RBC AUTO-ENTMCNC: 32.1 G/DL (ref 31.5–35.7)
MCV RBC AUTO: 81.5 FL (ref 79–97)
MICROALBUMIN/CREAT UR: NORMAL MG/G{CREAT}
MONOCYTES # BLD AUTO: 0.63 10*3/MM3 (ref 0.1–0.9)
MONOCYTES NFR BLD AUTO: 9.1 % (ref 5–12)
NEUTROPHILS NFR BLD AUTO: 3.96 10*3/MM3 (ref 1.7–7)
NEUTROPHILS NFR BLD AUTO: 57.4 % (ref 42.7–76)
NRBC BLD AUTO-RTO: 0 /100 WBC (ref 0–0.2)
PLATELET # BLD AUTO: 299 10*3/MM3 (ref 140–450)
PMV BLD AUTO: 11.4 FL (ref 6–12)
POTASSIUM SERPL-SCNC: 4.3 MMOL/L (ref 3.5–5.2)
PROT SERPL-MCNC: 7.5 G/DL (ref 6–8.5)
RBC # BLD AUTO: 5.2 10*6/MM3 (ref 3.77–5.28)
SODIUM SERPL-SCNC: 137 MMOL/L (ref 136–145)
TRIGL SERPL-MCNC: 199 MG/DL (ref 0–150)
TSH SERPL DL<=0.05 MIU/L-ACNC: 2.17 UIU/ML (ref 0.27–4.2)
VLDLC SERPL-MCNC: 35 MG/DL (ref 5–40)
WBC NRBC COR # BLD AUTO: 6.9 10*3/MM3 (ref 3.4–10.8)

## 2025-03-03 PROCEDURE — 83036 HEMOGLOBIN GLYCOSYLATED A1C: CPT | Performed by: NURSE PRACTITIONER

## 2025-03-03 PROCEDURE — 99214 OFFICE O/P EST MOD 30 MIN: CPT | Performed by: NURSE PRACTITIONER

## 2025-03-03 PROCEDURE — 82043 UR ALBUMIN QUANTITATIVE: CPT | Performed by: NURSE PRACTITIONER

## 2025-03-03 PROCEDURE — 80050 GENERAL HEALTH PANEL: CPT | Performed by: NURSE PRACTITIONER

## 2025-03-03 PROCEDURE — 80061 LIPID PANEL: CPT | Performed by: NURSE PRACTITIONER

## 2025-03-03 PROCEDURE — 82570 ASSAY OF URINE CREATININE: CPT | Performed by: NURSE PRACTITIONER

## 2025-03-03 RX ORDER — CHLORHEXIDINE GLUCONATE 40 MG/ML
1 SOLUTION TOPICAL WEEKLY
Qty: 118 ML | Refills: 1 | Status: SHIPPED | OUTPATIENT
Start: 2025-03-03 | End: 2025-04-02

## 2025-03-03 NOTE — PROGRESS NOTES
"Chief Complaint  Diabetes (Follow up (Jardiance) 10 MG), Hyperlipidemia, Infected wound ( follow up- bilateral knees- staph infection), and Nevus (Right buttocks- peeling of mole- 1 year)      Subjective      History of Present Illness  The patient is a 64-year-old female presenting for a follow-up regarding her diabetes management and a recent infection of a knee wound. She reports sustaining bilateral knee injuries following a fall, which subsequently led to staphylococcal infections. Initial treatment at an urgent care facility included the application of Dial soap three times daily, mupirocin ointment, and a 10-day course of oral Bactrim (trimethoprim-sulfamethoxazole).  She has a history of staphylococcal infections, initially presenting above the foot accompanied by fever, which was managed with antibiotic therapy.     Additionally, she reports a recurrent mole on her posterior aspect that is not causing discomfort. This has gotten slightly larger over time    The patient denies experiencing any swelling, chest pain, headaches, dizziness, fevers, chills, or leg cramps.             Objective   Vital Signs:   Vitals:    03/03/25 1328   BP: 128/72   BP Location: Right arm   Patient Position: Sitting   Cuff Size: Adult   Pulse: 75   Resp: 16   Temp: 98 °F (36.7 °C)   TempSrc: Temporal   SpO2: 98%   Weight: 97.1 kg (214 lb)   Height: 162.6 cm (64\")     Body mass index is 36.73 kg/m².    Wt Readings from Last 3 Encounters:   03/03/25 97.1 kg (214 lb)   02/28/25 95.3 kg (210 lb)   12/16/24 94.9 kg (209 lb 3.2 oz)     BP Readings from Last 3 Encounters:   03/03/25 128/72   02/28/25 159/69   12/16/24 148/81       Health Maintenance   Topic Date Due    DIABETIC FOOT EXAM  Never done    DIABETIC EYE EXAM  Never done    URINE MICROALBUMIN-CREATININE RATIO (uACR)  Never done    PAP SMEAR  03/18/2024    MAMMOGRAM  10/25/2024    HEMOGLOBIN A1C  01/18/2025    COVID-19 Vaccine (4 - 2024-25 season) 03/07/2025 (Originally " 9/1/2024)    ANNUAL PHYSICAL  03/25/2025    BMI FOLLOWUP  03/25/2025    LIPID PANEL  07/18/2025    TDAP/TD VACCINES (3 - Td or Tdap) 04/27/2030    COLORECTAL CANCER SCREENING  04/23/2031    HEPATITIS C SCREENING  Completed    INFLUENZA VACCINE  Completed    Pneumococcal Vaccine 50+  Completed    ZOSTER VACCINE  Completed       Physical Exam  Vitals and nursing note reviewed.   Constitutional:       General: She is not in acute distress.     Appearance: Normal appearance.   HENT:      Head: Normocephalic and atraumatic.      Right Ear: External ear normal.      Left Ear: External ear normal.      Nose: Nose normal.      Mouth/Throat:      Mouth: Mucous membranes are moist.   Eyes:      Conjunctiva/sclera: Conjunctivae normal.   Cardiovascular:      Rate and Rhythm: Normal rate and regular rhythm.      Pulses: Normal pulses.      Heart sounds: Normal heart sounds. No murmur heard.     No friction rub. No gallop.   Pulmonary:      Effort: Pulmonary effort is normal. No respiratory distress.      Breath sounds: No wheezing, rhonchi or rales.   Musculoskeletal:      Cervical back: Neck supple.      Right lower leg: No edema.      Left lower leg: No edema.   Skin:     General: Skin is warm and dry.      Comments: SK of right buttock, raised   Neurological:      General: No focal deficit present.      Mental Status: She is alert and oriented to person, place, and time.   Psychiatric:         Mood and Affect: Mood normal.         Behavior: Behavior normal.        Physical Exam        Result Review :  The following data was reviewed by: JOSE Becerra on 03/03/2025:         Results              Procedures            Assessment & Plan  Infected wound    Orders:    Chlorhexidine Gluconate 4 % solution; Apply 1 Application topically 1 (One) Time Per Week for 30 days.    Type 2 diabetes mellitus without complication, without long-term current use of insulin      Orders:    Ambulatory Referral to Dermatology    CBC &  Differential    Comprehensive Metabolic Panel    Hemoglobin A1c    Lipid Panel    TSH    Microalbumin / Creatinine Urine Ratio - Urine, Random Void    SK (seborrheic keratosis)              Assessment & Plan  1. Staphylococcal infection:  - Continue 10-day Bactrim and mupirocin  - Inform clinic if no improvement  - Post-recovery, use Hibiclens or chlorhexidine weekly for 4 weeks (avoid head and face)  - Avoid peroxide; petroleum jelly acceptable  - Chlorhexidine wash prescription sent to [facility]    2. Seborrheic keratosis:  - Benign lesion, may enlarge  - Removal if bothersome  - Seek second opinion if changes occur    3. Diabetes Mellitus:  - Lab work for A1c, cholesterol, thyroid, blood counts, kidney, liver, and urine analysis  - Jardiance prescription sent to [facility]    Follow-up in 6 months    Patient or patient representative verbalized consent for the use of Ambient Listening during the visit with  JOSE Becerra for chart documentation. 3/3/2025  13:54 EST      FOLLOW UP  No follow-ups on file.  Patient was given instructions and counseling regarding her condition or for health maintenance advice. Please see specific information pulled into the AVS if appropriate.     JOSE Becerra  03/03/25  15:13 EST    CURRENT & DISCONTINUED MEDICATIONS  Current Outpatient Medications   Medication Instructions    Airsupra 90-80 MCG/ACT aerosol 2 puffs, As Needed    albuterol (PROVENTIL) 2.5 mg, Every 6 Hours PRN    azelastine (ASTELIN) 0.1 % nasal spray 2 sprays, Nasal, 2 Times Daily, Use in each nostril as directed    cetirizine (ZYRTEC) 10 mg, Daily    Chlorhexidine Gluconate 4 % solution 1 Application, Apply externally, Weekly    empagliflozin (JARDIANCE) 10 mg, Oral, Daily    EPINEPHrine 0.3 MG/0.3ML solution prefilled syringe Inject  as directed.    fluticasone (FLONASE) 50 MCG/ACT nasal spray USE 2 SPRAY(S) IN EACH NOSTRIL ONCE DAILY AS NEEDED FOR RHINITIS OR ALLERGIES    mupirocin (BACTROBAN) 2 %  ointment 1 Application, Topical, 3 Times Daily    potassium citrate (UROCIT-K) 10 MEQ (1080 MG) CR tablet 10 mEq, Oral, 3 Times Daily With Meals    rosuvastatin (CRESTOR) 10 mg, Oral, Nightly    sulfamethoxazole-trimethoprim (BACTRIM DS,SEPTRA DS) 800-160 MG per tablet 1 tablet, Oral, 2 Times Daily       Medications Discontinued During This Encounter   Medication Reason    empagliflozin (Jardiance) 10 MG tablet tablet Reorder

## 2025-03-04 RX ORDER — ROSUVASTATIN CALCIUM 20 MG/1
20 TABLET, COATED ORAL NIGHTLY
Qty: 90 TABLET | Refills: 1 | Status: SHIPPED | OUTPATIENT
Start: 2025-03-04

## 2025-03-07 ENCOUNTER — TELEPHONE (OUTPATIENT)
Dept: INTERNAL MEDICINE | Facility: CLINIC | Age: 65
End: 2025-03-07
Payer: COMMERCIAL

## 2025-03-07 NOTE — TELEPHONE ENCOUNTER
Caller: Marilee De La Rosa    Relationship: Self    Best call back number: [phone]     What medication are you requesting: ANOTHER ANTIBIOTIC    What are your current symptoms: INFECTION    How long have you been experiencing symptoms: A WEEK    Have you had these symptoms before:    [x] Yes  [] No    Have you been treated for these symptoms before:   [x] Yes  [] No    If a prescription is needed, what is your preferred pharmacy and phone number: [address] [phone] [facility] [phone]

## 2025-03-11 ENCOUNTER — OFFICE VISIT (OUTPATIENT)
Dept: INTERNAL MEDICINE | Facility: CLINIC | Age: 65
End: 2025-03-11
Payer: COMMERCIAL

## 2025-03-11 VITALS
HEIGHT: 64 IN | OXYGEN SATURATION: 96 % | RESPIRATION RATE: 18 BRPM | BODY MASS INDEX: 36.16 KG/M2 | SYSTOLIC BLOOD PRESSURE: 132 MMHG | WEIGHT: 211.8 LBS | HEART RATE: 73 BPM | TEMPERATURE: 97.4 F | DIASTOLIC BLOOD PRESSURE: 64 MMHG

## 2025-03-11 DIAGNOSIS — R21 RASH AND NONSPECIFIC SKIN ERUPTION: Primary | ICD-10-CM

## 2025-03-11 PROCEDURE — 99213 OFFICE O/P EST LOW 20 MIN: CPT | Performed by: PHYSICIAN ASSISTANT

## 2025-03-11 RX ORDER — DOXYCYCLINE 100 MG/1
100 CAPSULE ORAL 2 TIMES DAILY
Qty: 14 CAPSULE | Refills: 0 | Status: SHIPPED | OUTPATIENT
Start: 2025-03-11 | End: 2025-03-18

## 2025-03-11 NOTE — ASSESSMENT & PLAN NOTE
Appears to be healing at this time.  No need for additional medication currently, however, since acute visit availability is limited in our office I will send in a course of doxycycline to start if rash returns and worsens.  Patient is to document this via photograph and contact our office if that happens.  Call for any questions or concerns.

## 2025-03-11 NOTE — PROGRESS NOTES
"Chief Complaint  Knee Pain (Staph infection in both knees')    Subjective          Marilee De La Rosa presents to [facility]    Rash on knee- patient has had rash on knees and was treated as staph infection with bactrim.   Symptoms have improved since taking the antibiotics but patient is concerned and wants to make sure that it looks like it is healing well and does not need more antibiotics currently.    Objective   Vital Signs:   /64 (BP Location: Left arm, Patient Position: Sitting, Cuff Size: Adult)   Pulse 73   Temp 97.4 °F (36.3 °C) (Infrared)   Resp 18   Ht 162.6 cm (64.02\")   Wt 96.1 kg (211 lb 12.8 oz)   SpO2 96%   BMI 36.34 kg/m²     Physical Exam  Vitals reviewed.   Constitutional:       Appearance: Normal appearance. She is well-developed.   HENT:      Head: Normocephalic and atraumatic.   Eyes:      Conjunctiva/sclera: Conjunctivae normal.      Pupils: Pupils are equal, round, and reactive to light.   Cardiovascular:      Rate and Rhythm: Normal rate and regular rhythm.      Heart sounds: No murmur heard.     No friction rub. No gallop.   Pulmonary:      Effort: Pulmonary effort is normal.      Breath sounds: Normal breath sounds. No wheezing or rhonchi.   Skin:     General: Skin is warm and dry.      Findings: Rash (a few scattered, deep erythematous/purple papules on knees) present.   Neurological:      Mental Status: She is alert and oriented to person, place, and time.      Cranial Nerves: No cranial nerve deficit.   Psychiatric:         Mood and Affect: Mood and affect normal.         Behavior: Behavior normal.         Thought Content: Thought content normal.         Judgment: Judgment normal.        Result Review :          Procedures      Assessment and Plan    Diagnoses and all orders for this visit:    1. Rash and nonspecific skin eruption (Primary)  Assessment & Plan:  Appears to be healing at this time.  No need for additional " medication currently, however, since acute visit availability is limited in our office I will send in a course of doxycycline to start if rash returns and worsens.  Patient is to document this via photograph and contact our office if that happens.  Call for any questions or concerns.      Other orders  -     doxycycline (VIBRAMYCIN) 100 MG capsule; Take 1 capsule by mouth 2 (Two) Times a Day for 7 days.  Dispense: 14 capsule; Refill: 0              Follow Up   No follow-ups on file.  Patient was given instructions and counseling regarding her condition or for health maintenance advice. Please see specific information pulled into the AVS if appropriate.

## 2025-03-19 ENCOUNTER — CLINICAL SUPPORT (OUTPATIENT)
Dept: INTERNAL MEDICINE | Facility: CLINIC | Age: 65
End: 2025-03-19
Payer: COMMERCIAL

## 2025-03-19 DIAGNOSIS — R89.9 ABNORMAL LABORATORY TEST: Primary | ICD-10-CM

## 2025-03-19 LAB
ANION GAP SERPL CALCULATED.3IONS-SCNC: 9.1 MMOL/L (ref 5–15)
BUN SERPL-MCNC: 18 MG/DL (ref 8–23)
BUN/CREAT SERPL: 23.4 (ref 7–25)
CALCIUM SPEC-SCNC: 9.6 MG/DL (ref 8.6–10.5)
CHLORIDE SERPL-SCNC: 102 MMOL/L (ref 98–107)
CO2 SERPL-SCNC: 27.9 MMOL/L (ref 22–29)
CREAT SERPL-MCNC: 0.77 MG/DL (ref 0.57–1)
EGFRCR SERPLBLD CKD-EPI 2021: 86.8 ML/MIN/1.73
GLUCOSE SERPL-MCNC: 99 MG/DL (ref 65–99)
POTASSIUM SERPL-SCNC: 4.2 MMOL/L (ref 3.5–5.2)
SODIUM SERPL-SCNC: 139 MMOL/L (ref 136–145)

## 2025-03-19 PROCEDURE — 36415 COLL VENOUS BLD VENIPUNCTURE: CPT | Performed by: NURSE PRACTITIONER

## 2025-03-19 PROCEDURE — 80048 BASIC METABOLIC PNL TOTAL CA: CPT | Performed by: NURSE PRACTITIONER

## 2025-03-19 NOTE — PROGRESS NOTES
Venipuncture Blood Specimen Collection  Venipuncture performed in [facility] by Kelly Gomez RN with good hemostasis. Patient tolerated the procedure well without complications.   03/19/25   Kelly Gomez RN

## 2025-03-20 ENCOUNTER — TELEPHONE (OUTPATIENT)
Dept: INTERNAL MEDICINE | Facility: CLINIC | Age: 65
End: 2025-03-20
Payer: COMMERCIAL

## 2025-03-20 NOTE — TELEPHONE ENCOUNTER
Caller: Marilee De La Rosa    Relationship: Self    Best call back number: [phone]       Who are you requesting to speak with (clinical staff, provider,  specific staff member): LILY LUND        What was the call regarding: PATIENT IS LETTING LILY KNOW THAT STAFF HAS RETURNED TO ONLY HER RIGHT KNEE -- SHE HAS STARTED THE MEDICATION THAT WAS PREVIOUSLY PRESCRIBED    ”

## 2025-03-25 ENCOUNTER — OFFICE VISIT (OUTPATIENT)
Dept: INTERNAL MEDICINE | Facility: CLINIC | Age: 65
End: 2025-03-25
Payer: COMMERCIAL

## 2025-03-25 VITALS
HEIGHT: 64 IN | DIASTOLIC BLOOD PRESSURE: 62 MMHG | SYSTOLIC BLOOD PRESSURE: 138 MMHG | BODY MASS INDEX: 36.26 KG/M2 | OXYGEN SATURATION: 96 % | TEMPERATURE: 98.1 F | HEART RATE: 78 BPM | WEIGHT: 212.4 LBS

## 2025-03-25 DIAGNOSIS — R21 RASH AND NONSPECIFIC SKIN ERUPTION: Primary | ICD-10-CM

## 2025-03-25 PROCEDURE — 99213 OFFICE O/P EST LOW 20 MIN: CPT | Performed by: PHYSICIAN ASSISTANT

## 2025-03-25 RX ORDER — MONTELUKAST SODIUM 10 MG/1
10 TABLET ORAL
COMMUNITY
Start: 2025-03-05

## 2025-03-25 NOTE — PROGRESS NOTES
"Chief Complaint  Follow-up    Subjective          Marilee De La Rosa presents to [facility]    Rash- patient had a rash which was improving when she saw me last week.  Since then the rash seemed to worsen.  She had been prescribed doxycycline which she has been taking. The rash has been doing well since taking the antibiotics.     Objective   Vital Signs:   /62   Pulse 78   Temp 98.1 °F (36.7 °C)   Ht 162.6 cm (64.02\")   Wt 96.3 kg (212 lb 6.4 oz)   SpO2 96%   BMI 36.44 kg/m²     Physical Exam  Vitals reviewed.   Constitutional:       Appearance: Normal appearance. She is well-developed.   HENT:      Head: Normocephalic and atraumatic.   Eyes:      Conjunctiva/sclera: Conjunctivae normal.      Pupils: Pupils are equal, round, and reactive to light.   Cardiovascular:      Rate and Rhythm: Normal rate and regular rhythm.      Heart sounds: No murmur heard.     No friction rub. No gallop.   Pulmonary:      Effort: Pulmonary effort is normal.      Breath sounds: Normal breath sounds. No wheezing or rhonchi.   Skin:     General: Skin is warm and dry.      Findings: Rash (healing lesions on lower leg, picture in media tab) present.   Neurological:      Mental Status: She is alert and oriented to person, place, and time.      Cranial Nerves: No cranial nerve deficit.   Psychiatric:         Mood and Affect: Mood and affect normal.         Behavior: Behavior normal.         Thought Content: Thought content normal.         Judgment: Judgment normal.        Result Review :          Procedures      Assessment and Plan    Diagnoses and all orders for this visit:    1. Rash and nonspecific skin eruption (Primary)  Assessment & Plan:  Finished current antibiotics at this time.  Ok to use Mupirocin ointment as needed. Would also recommend mupirocin in nares BID x 3 days. Follow up with PCP in 1 month, sooner for new or worsening symptoms.                Follow Up   No " follow-ups on file.  Patient was given instructions and counseling regarding her condition or for health maintenance advice. Please see specific information pulled into the AVS if appropriate.

## 2025-03-25 NOTE — ASSESSMENT & PLAN NOTE
Finished current antibiotics at this time.  Ok to use Mupirocin ointment as needed. Would also recommend mupirocin in nares BID x 3 days. Follow up with PCP in 1 month, sooner for new or worsening symptoms.

## 2025-04-01 ENCOUNTER — TELEPHONE (OUTPATIENT)
Dept: INTERNAL MEDICINE | Facility: CLINIC | Age: 65
End: 2025-04-01
Payer: COMMERCIAL

## 2025-04-01 RX ORDER — MUPIROCIN CALCIUM 20 MG/G
1 CREAM TOPICAL 3 TIMES DAILY
Qty: 30 G | Refills: 1 | Status: SHIPPED | OUTPATIENT
Start: 2025-04-01 | End: 2025-04-01

## 2025-04-01 NOTE — TELEPHONE ENCOUNTER
Pharmacy Name:  [facility]        Pharmacy representative name: FILOMENA     Pharmacy representative phone number:  [phone]    What medication are you calling in regards to: mupirocin (BACTROBAN) 2 % cream     What question does the pharmacy have: CAN IT BE CHANGE TO OINTMENT     Who is the provider that prescribed the medication:  OSCAR LUND    Additional notes:  FILOMENA SAID THE INSURANCE DO NOT COVER THE CREAM AND NEEDS TO BE CHANGE TO OINTMENT

## 2025-08-22 RX ORDER — ROSUVASTATIN CALCIUM 20 MG/1
20 TABLET, COATED ORAL NIGHTLY
Qty: 90 TABLET | Refills: 0 | Status: SHIPPED | OUTPATIENT
Start: 2025-08-22

## (undated) DEVICE — DUAL LUMEN URETERAL CATHETER

## (undated) DEVICE — GW ZIPWIRE STD/SHFT STR TPR/3CM .038IN 150CM

## (undated) DEVICE — GLV SURG SENSICARE SLT PF LF 6.5 STRL

## (undated) DEVICE — URETERAL ACCESS SHEATH SET: Brand: NAVIGATOR HD

## (undated) DEVICE — FIBR LASR HOLMIUM COMPAT 272MH DISP

## (undated) DEVICE — SKIN PREP TRAY W/CHG: Brand: MEDLINE INDUSTRIES, INC.

## (undated) DEVICE — BASIC SINGLE BASIN-LF: Brand: MEDLINE INDUSTRIES, INC.

## (undated) DEVICE — Device

## (undated) DEVICE — Y-TYPE TUR/BLADDER IRRIGATION SET, REGULATING CLAMP

## (undated) DEVICE — SOL IRR NACL 0.9PCT 3000ML

## (undated) DEVICE — SYS IRR PUMP SGL ACTN VAC SYR 10CC

## (undated) DEVICE — SINGLE-USE DIGITAL FLEXIBLE URETEROSCOPE: Brand: LITHOVUE

## (undated) DEVICE — ENDOSCOPIC VALVE WITH ADAPTER.: Brand: SURSEAL® II

## (undated) DEVICE — CATH 2L URETRL HC 6F 50CM

## (undated) DEVICE — CYSTO PACK: Brand: MEDLINE INDUSTRIES, INC.

## (undated) DEVICE — NITINOL STONE RETRIEVAL BASKET: Brand: ESCAPE

## (undated) DEVICE — GOWN,REINFORCE,POLY,SIRUS,BREATH SLV,XLG: Brand: MEDLINE

## (undated) DEVICE — GW ZIPWIRE STD/SHFT STR TPR/3CM .035IN 150CM